# Patient Record
Sex: FEMALE | Race: BLACK OR AFRICAN AMERICAN | NOT HISPANIC OR LATINO | URBAN - METROPOLITAN AREA
[De-identification: names, ages, dates, MRNs, and addresses within clinical notes are randomized per-mention and may not be internally consistent; named-entity substitution may affect disease eponyms.]

---

## 2023-08-21 ENCOUNTER — OUTPATIENT (OUTPATIENT)
Dept: OUTPATIENT SERVICES | Facility: HOSPITAL | Age: 23
LOS: 1 days | End: 2023-08-21
Payer: COMMERCIAL

## 2023-08-21 DIAGNOSIS — Z3A.00 WEEKS OF GESTATION OF PREGNANCY NOT SPECIFIED: ICD-10-CM

## 2023-08-21 DIAGNOSIS — O26.899 OTHER SPECIFIED PREGNANCY RELATED CONDITIONS, UNSPECIFIED TRIMESTER: ICD-10-CM

## 2023-08-21 LAB
ALBUMIN SERPL ELPH-MCNC: 3.7 G/DL — SIGNIFICANT CHANGE UP (ref 3.3–5)
ALP SERPL-CCNC: 54 U/L — SIGNIFICANT CHANGE UP (ref 40–120)
ALT FLD-CCNC: 15 U/L — SIGNIFICANT CHANGE UP (ref 10–45)
ANION GAP SERPL CALC-SCNC: 7 MMOL/L — SIGNIFICANT CHANGE UP (ref 5–17)
APPEARANCE UR: CLEAR — SIGNIFICANT CHANGE UP
AST SERPL-CCNC: 23 U/L — SIGNIFICANT CHANGE UP (ref 10–40)
BASOPHILS # BLD AUTO: 0.03 K/UL — SIGNIFICANT CHANGE UP (ref 0–0.2)
BASOPHILS NFR BLD AUTO: 0.4 % — SIGNIFICANT CHANGE UP (ref 0–2)
BILIRUB SERPL-MCNC: 0.9 MG/DL — SIGNIFICANT CHANGE UP (ref 0.2–1.2)
BILIRUB UR-MCNC: NEGATIVE — SIGNIFICANT CHANGE UP
BUN SERPL-MCNC: 10 MG/DL — SIGNIFICANT CHANGE UP (ref 7–23)
CALCIUM SERPL-MCNC: 9.2 MG/DL — SIGNIFICANT CHANGE UP (ref 8.4–10.5)
CHLORIDE SERPL-SCNC: 104 MMOL/L — SIGNIFICANT CHANGE UP (ref 96–108)
CO2 SERPL-SCNC: 22 MMOL/L — SIGNIFICANT CHANGE UP (ref 22–31)
COLOR SPEC: YELLOW — SIGNIFICANT CHANGE UP
CREAT SERPL-MCNC: 0.54 MG/DL — SIGNIFICANT CHANGE UP (ref 0.5–1.3)
DIFF PNL FLD: NEGATIVE — SIGNIFICANT CHANGE UP
EGFR: 131 ML/MIN/1.73M2 — SIGNIFICANT CHANGE UP
EOSINOPHIL # BLD AUTO: 0.05 K/UL — SIGNIFICANT CHANGE UP (ref 0–0.5)
EOSINOPHIL NFR BLD AUTO: 0.7 % — SIGNIFICANT CHANGE UP (ref 0–6)
GLUCOSE SERPL-MCNC: 113 MG/DL — HIGH (ref 70–99)
GLUCOSE UR QL: 100
HCT VFR BLD CALC: 33.3 % — LOW (ref 34.5–45)
HGB BLD-MCNC: 12 G/DL — SIGNIFICANT CHANGE UP (ref 11.5–15.5)
IMM GRANULOCYTES NFR BLD AUTO: 0.7 % — SIGNIFICANT CHANGE UP (ref 0–0.9)
KETONES UR-MCNC: NEGATIVE — SIGNIFICANT CHANGE UP
LEUKOCYTE ESTERASE UR-ACNC: NEGATIVE — SIGNIFICANT CHANGE UP
LYMPHOCYTES # BLD AUTO: 1.91 K/UL — SIGNIFICANT CHANGE UP (ref 1–3.3)
LYMPHOCYTES # BLD AUTO: 24.9 % — SIGNIFICANT CHANGE UP (ref 13–44)
MCHC RBC-ENTMCNC: 32.7 PG — SIGNIFICANT CHANGE UP (ref 27–34)
MCHC RBC-ENTMCNC: 36 GM/DL — SIGNIFICANT CHANGE UP (ref 32–36)
MCV RBC AUTO: 90.7 FL — SIGNIFICANT CHANGE UP (ref 80–100)
MONOCYTES # BLD AUTO: 0.44 K/UL — SIGNIFICANT CHANGE UP (ref 0–0.9)
MONOCYTES NFR BLD AUTO: 5.7 % — SIGNIFICANT CHANGE UP (ref 2–14)
NEUTROPHILS # BLD AUTO: 5.19 K/UL — SIGNIFICANT CHANGE UP (ref 1.8–7.4)
NEUTROPHILS NFR BLD AUTO: 67.6 % — SIGNIFICANT CHANGE UP (ref 43–77)
NITRITE UR-MCNC: NEGATIVE — SIGNIFICANT CHANGE UP
NRBC # BLD: 0 /100 WBCS — SIGNIFICANT CHANGE UP (ref 0–0)
PH UR: 6 — SIGNIFICANT CHANGE UP (ref 5–8)
PLATELET # BLD AUTO: 203 K/UL — SIGNIFICANT CHANGE UP (ref 150–400)
POTASSIUM SERPL-MCNC: 3.8 MMOL/L — SIGNIFICANT CHANGE UP (ref 3.5–5.3)
POTASSIUM SERPL-SCNC: 3.8 MMOL/L — SIGNIFICANT CHANGE UP (ref 3.5–5.3)
PROT SERPL-MCNC: 6.7 G/DL — SIGNIFICANT CHANGE UP (ref 6–8.3)
PROT UR-MCNC: NEGATIVE MG/DL — SIGNIFICANT CHANGE UP
RBC # BLD: 3.67 M/UL — LOW (ref 3.8–5.2)
RBC # FLD: 12.8 % — SIGNIFICANT CHANGE UP (ref 10.3–14.5)
SODIUM SERPL-SCNC: 133 MMOL/L — LOW (ref 135–145)
SP GR SPEC: 1.02 — SIGNIFICANT CHANGE UP (ref 1–1.03)
UROBILINOGEN FLD QL: 1 E.U./DL — SIGNIFICANT CHANGE UP
WBC # BLD: 7.67 K/UL — SIGNIFICANT CHANGE UP (ref 3.8–10.5)
WBC # FLD AUTO: 7.67 K/UL — SIGNIFICANT CHANGE UP (ref 3.8–10.5)

## 2023-08-21 PROCEDURE — 36415 COLL VENOUS BLD VENIPUNCTURE: CPT

## 2023-08-21 PROCEDURE — 80053 COMPREHEN METABOLIC PANEL: CPT

## 2023-08-21 PROCEDURE — 87086 URINE CULTURE/COLONY COUNT: CPT

## 2023-08-21 PROCEDURE — 99214 OFFICE O/P EST MOD 30 MIN: CPT

## 2023-08-21 PROCEDURE — 85025 COMPLETE CBC W/AUTO DIFF WBC: CPT

## 2023-08-21 PROCEDURE — 81003 URINALYSIS AUTO W/O SCOPE: CPT

## 2023-08-21 RX ORDER — SODIUM CHLORIDE 9 MG/ML
1000 INJECTION, SOLUTION INTRAVENOUS
Refills: 0 | Status: DISCONTINUED | OUTPATIENT
Start: 2023-08-21 | End: 2023-09-05

## 2023-08-21 RX ORDER — ACETAMINOPHEN 500 MG
1000 TABLET ORAL ONCE
Refills: 0 | Status: COMPLETED | OUTPATIENT
Start: 2023-08-21 | End: 2023-08-21

## 2023-08-21 RX ADMIN — Medication 400 MILLIGRAM(S): at 15:50

## 2023-08-21 RX ADMIN — SODIUM CHLORIDE 999 MILLILITER(S): 9 INJECTION, SOLUTION INTRAVENOUS at 15:46

## 2023-08-22 LAB
CULTURE RESULTS: NO GROWTH — SIGNIFICANT CHANGE UP
SPECIMEN SOURCE: SIGNIFICANT CHANGE UP

## 2023-11-14 ENCOUNTER — OUTPATIENT (OUTPATIENT)
Dept: OUTPATIENT SERVICES | Facility: HOSPITAL | Age: 23
LOS: 1 days | End: 2023-11-14
Payer: COMMERCIAL

## 2023-11-14 ENCOUNTER — EMERGENCY (EMERGENCY)
Facility: HOSPITAL | Age: 23
LOS: 1 days | Discharge: HOME | End: 2023-11-14
Attending: STUDENT IN AN ORGANIZED HEALTH CARE EDUCATION/TRAINING PROGRAM | Admitting: STUDENT IN AN ORGANIZED HEALTH CARE EDUCATION/TRAINING PROGRAM
Payer: COMMERCIAL

## 2023-11-14 VITALS
SYSTOLIC BLOOD PRESSURE: 159 MMHG | HEART RATE: 82 BPM | OXYGEN SATURATION: 99 % | RESPIRATION RATE: 24 BRPM | DIASTOLIC BLOOD PRESSURE: 102 MMHG | TEMPERATURE: 97 F

## 2023-11-14 DIAGNOSIS — O26.899 OTHER SPECIFIED PREGNANCY RELATED CONDITIONS, UNSPECIFIED TRIMESTER: ICD-10-CM

## 2023-11-14 LAB
ALBUMIN SERPL ELPH-MCNC: 3.6 G/DL — SIGNIFICANT CHANGE UP (ref 3.3–5)
ALBUMIN SERPL ELPH-MCNC: 3.6 G/DL — SIGNIFICANT CHANGE UP (ref 3.3–5)
ALP SERPL-CCNC: 90 U/L — SIGNIFICANT CHANGE UP (ref 40–120)
ALP SERPL-CCNC: 90 U/L — SIGNIFICANT CHANGE UP (ref 40–120)
ALT FLD-CCNC: 48 U/L — HIGH (ref 10–45)
ALT FLD-CCNC: 48 U/L — HIGH (ref 10–45)
ANION GAP SERPL CALC-SCNC: 12 MMOL/L — SIGNIFICANT CHANGE UP (ref 5–17)
ANION GAP SERPL CALC-SCNC: 12 MMOL/L — SIGNIFICANT CHANGE UP (ref 5–17)
APPEARANCE UR: CLEAR — SIGNIFICANT CHANGE UP
APPEARANCE UR: CLEAR — SIGNIFICANT CHANGE UP
APTT BLD: 23.6 SEC — LOW (ref 24.5–35.6)
APTT BLD: 23.6 SEC — LOW (ref 24.5–35.6)
AST SERPL-CCNC: 62 U/L — HIGH (ref 10–40)
AST SERPL-CCNC: 62 U/L — HIGH (ref 10–40)
BASOPHILS # BLD AUTO: 0.05 K/UL — SIGNIFICANT CHANGE UP (ref 0–0.2)
BASOPHILS # BLD AUTO: 0.05 K/UL — SIGNIFICANT CHANGE UP (ref 0–0.2)
BASOPHILS NFR BLD AUTO: 0.5 % — SIGNIFICANT CHANGE UP (ref 0–2)
BASOPHILS NFR BLD AUTO: 0.5 % — SIGNIFICANT CHANGE UP (ref 0–2)
BILIRUB SERPL-MCNC: 0.5 MG/DL — SIGNIFICANT CHANGE UP (ref 0.2–1.2)
BILIRUB SERPL-MCNC: 0.5 MG/DL — SIGNIFICANT CHANGE UP (ref 0.2–1.2)
BILIRUB UR-MCNC: NEGATIVE — SIGNIFICANT CHANGE UP
BILIRUB UR-MCNC: NEGATIVE — SIGNIFICANT CHANGE UP
BLD GP AB SCN SERPL QL: NEGATIVE — SIGNIFICANT CHANGE UP
BLD GP AB SCN SERPL QL: NEGATIVE — SIGNIFICANT CHANGE UP
BUN SERPL-MCNC: 9 MG/DL — SIGNIFICANT CHANGE UP (ref 7–23)
BUN SERPL-MCNC: 9 MG/DL — SIGNIFICANT CHANGE UP (ref 7–23)
CALCIUM SERPL-MCNC: 9.2 MG/DL — SIGNIFICANT CHANGE UP (ref 8.4–10.5)
CALCIUM SERPL-MCNC: 9.2 MG/DL — SIGNIFICANT CHANGE UP (ref 8.4–10.5)
CHLORIDE SERPL-SCNC: 101 MMOL/L — SIGNIFICANT CHANGE UP (ref 96–108)
CHLORIDE SERPL-SCNC: 101 MMOL/L — SIGNIFICANT CHANGE UP (ref 96–108)
CO2 SERPL-SCNC: 21 MMOL/L — LOW (ref 22–31)
CO2 SERPL-SCNC: 21 MMOL/L — LOW (ref 22–31)
COLOR SPEC: YELLOW — SIGNIFICANT CHANGE UP
COLOR SPEC: YELLOW — SIGNIFICANT CHANGE UP
CREAT ?TM UR-MCNC: 15 MG/DL — SIGNIFICANT CHANGE UP
CREAT ?TM UR-MCNC: 15 MG/DL — SIGNIFICANT CHANGE UP
CREAT SERPL-MCNC: 0.64 MG/DL — SIGNIFICANT CHANGE UP (ref 0.5–1.3)
CREAT SERPL-MCNC: 0.64 MG/DL — SIGNIFICANT CHANGE UP (ref 0.5–1.3)
DIFF PNL FLD: NEGATIVE — SIGNIFICANT CHANGE UP
DIFF PNL FLD: NEGATIVE — SIGNIFICANT CHANGE UP
EGFR: 126 ML/MIN/1.73M2 — SIGNIFICANT CHANGE UP
EGFR: 126 ML/MIN/1.73M2 — SIGNIFICANT CHANGE UP
EOSINOPHIL # BLD AUTO: 0.06 K/UL — SIGNIFICANT CHANGE UP (ref 0–0.5)
EOSINOPHIL # BLD AUTO: 0.06 K/UL — SIGNIFICANT CHANGE UP (ref 0–0.5)
EOSINOPHIL NFR BLD AUTO: 0.7 % — SIGNIFICANT CHANGE UP (ref 0–6)
EOSINOPHIL NFR BLD AUTO: 0.7 % — SIGNIFICANT CHANGE UP (ref 0–6)
FIBRINOGEN PPP-MCNC: 228 MG/DL — SIGNIFICANT CHANGE UP (ref 200–445)
FIBRINOGEN PPP-MCNC: 228 MG/DL — SIGNIFICANT CHANGE UP (ref 200–445)
GLUCOSE SERPL-MCNC: 90 MG/DL — SIGNIFICANT CHANGE UP (ref 70–99)
GLUCOSE SERPL-MCNC: 90 MG/DL — SIGNIFICANT CHANGE UP (ref 70–99)
GLUCOSE UR QL: NEGATIVE MG/DL — SIGNIFICANT CHANGE UP
GLUCOSE UR QL: NEGATIVE MG/DL — SIGNIFICANT CHANGE UP
HBV SURFACE AG SER-ACNC: SIGNIFICANT CHANGE UP
HBV SURFACE AG SER-ACNC: SIGNIFICANT CHANGE UP
HCT VFR BLD CALC: 28.9 % — LOW (ref 34.5–45)
HCT VFR BLD CALC: 28.9 % — LOW (ref 34.5–45)
HGB BLD-MCNC: 10.6 G/DL — LOW (ref 11.5–15.5)
HGB BLD-MCNC: 10.6 G/DL — LOW (ref 11.5–15.5)
HIV 1+2 AB+HIV1 P24 AG SERPL QL IA: SIGNIFICANT CHANGE UP
HIV 1+2 AB+HIV1 P24 AG SERPL QL IA: SIGNIFICANT CHANGE UP
IMM GRANULOCYTES NFR BLD AUTO: 2 % — HIGH (ref 0–0.9)
IMM GRANULOCYTES NFR BLD AUTO: 2 % — HIGH (ref 0–0.9)
INR BLD: 0.88 — SIGNIFICANT CHANGE UP (ref 0.85–1.18)
INR BLD: 0.88 — SIGNIFICANT CHANGE UP (ref 0.85–1.18)
KETONES UR-MCNC: NEGATIVE MG/DL — SIGNIFICANT CHANGE UP
KETONES UR-MCNC: NEGATIVE MG/DL — SIGNIFICANT CHANGE UP
KLEIHAUER-BETKE CALCULATION: 0 % — SIGNIFICANT CHANGE UP (ref 0–0.3)
KLEIHAUER-BETKE CALCULATION: 0 % — SIGNIFICANT CHANGE UP (ref 0–0.3)
LDH SERPL L TO P-CCNC: 218 U/L — SIGNIFICANT CHANGE UP (ref 50–242)
LDH SERPL L TO P-CCNC: 218 U/L — SIGNIFICANT CHANGE UP (ref 50–242)
LEUKOCYTE ESTERASE UR-ACNC: NEGATIVE — SIGNIFICANT CHANGE UP
LEUKOCYTE ESTERASE UR-ACNC: NEGATIVE — SIGNIFICANT CHANGE UP
LYMPHOCYTES # BLD AUTO: 2.31 K/UL — SIGNIFICANT CHANGE UP (ref 1–3.3)
LYMPHOCYTES # BLD AUTO: 2.31 K/UL — SIGNIFICANT CHANGE UP (ref 1–3.3)
LYMPHOCYTES # BLD AUTO: 25.2 % — SIGNIFICANT CHANGE UP (ref 13–44)
LYMPHOCYTES # BLD AUTO: 25.2 % — SIGNIFICANT CHANGE UP (ref 13–44)
MCHC RBC-ENTMCNC: 33 PG — SIGNIFICANT CHANGE UP (ref 27–34)
MCHC RBC-ENTMCNC: 33 PG — SIGNIFICANT CHANGE UP (ref 27–34)
MCHC RBC-ENTMCNC: 36.7 GM/DL — HIGH (ref 32–36)
MCHC RBC-ENTMCNC: 36.7 GM/DL — HIGH (ref 32–36)
MCV RBC AUTO: 90 FL — SIGNIFICANT CHANGE UP (ref 80–100)
MCV RBC AUTO: 90 FL — SIGNIFICANT CHANGE UP (ref 80–100)
MONOCYTES # BLD AUTO: 0.76 K/UL — SIGNIFICANT CHANGE UP (ref 0–0.9)
MONOCYTES # BLD AUTO: 0.76 K/UL — SIGNIFICANT CHANGE UP (ref 0–0.9)
MONOCYTES NFR BLD AUTO: 8.3 % — SIGNIFICANT CHANGE UP (ref 2–14)
MONOCYTES NFR BLD AUTO: 8.3 % — SIGNIFICANT CHANGE UP (ref 2–14)
NEUTROPHILS # BLD AUTO: 5.79 K/UL — SIGNIFICANT CHANGE UP (ref 1.8–7.4)
NEUTROPHILS # BLD AUTO: 5.79 K/UL — SIGNIFICANT CHANGE UP (ref 1.8–7.4)
NEUTROPHILS NFR BLD AUTO: 63.3 % — SIGNIFICANT CHANGE UP (ref 43–77)
NEUTROPHILS NFR BLD AUTO: 63.3 % — SIGNIFICANT CHANGE UP (ref 43–77)
NITRITE UR-MCNC: NEGATIVE — SIGNIFICANT CHANGE UP
NITRITE UR-MCNC: NEGATIVE — SIGNIFICANT CHANGE UP
NRBC # BLD: 0 /100 WBCS — SIGNIFICANT CHANGE UP (ref 0–0)
NRBC # BLD: 0 /100 WBCS — SIGNIFICANT CHANGE UP (ref 0–0)
PH UR: 7.5 — SIGNIFICANT CHANGE UP (ref 5–8)
PH UR: 7.5 — SIGNIFICANT CHANGE UP (ref 5–8)
PLATELET # BLD AUTO: 227 K/UL — SIGNIFICANT CHANGE UP (ref 150–400)
PLATELET # BLD AUTO: 227 K/UL — SIGNIFICANT CHANGE UP (ref 150–400)
POTASSIUM SERPL-MCNC: 3.6 MMOL/L — SIGNIFICANT CHANGE UP (ref 3.5–5.3)
POTASSIUM SERPL-MCNC: 3.6 MMOL/L — SIGNIFICANT CHANGE UP (ref 3.5–5.3)
POTASSIUM SERPL-SCNC: 3.6 MMOL/L — SIGNIFICANT CHANGE UP (ref 3.5–5.3)
POTASSIUM SERPL-SCNC: 3.6 MMOL/L — SIGNIFICANT CHANGE UP (ref 3.5–5.3)
PROT ?TM UR-MCNC: <4 MG/DL — SIGNIFICANT CHANGE UP (ref 0–12)
PROT ?TM UR-MCNC: <4 MG/DL — SIGNIFICANT CHANGE UP (ref 0–12)
PROT SERPL-MCNC: 6.6 G/DL — SIGNIFICANT CHANGE UP (ref 6–8.3)
PROT SERPL-MCNC: 6.6 G/DL — SIGNIFICANT CHANGE UP (ref 6–8.3)
PROT UR-MCNC: NEGATIVE MG/DL — SIGNIFICANT CHANGE UP
PROT UR-MCNC: NEGATIVE MG/DL — SIGNIFICANT CHANGE UP
PROT/CREAT UR-RTO: SIGNIFICANT CHANGE UP (ref 0–0.2)
PROT/CREAT UR-RTO: SIGNIFICANT CHANGE UP (ref 0–0.2)
PROTHROM AB SERPL-ACNC: 10.1 SEC — SIGNIFICANT CHANGE UP (ref 9.5–13)
PROTHROM AB SERPL-ACNC: 10.1 SEC — SIGNIFICANT CHANGE UP (ref 9.5–13)
RBC # BLD: 3.21 M/UL — LOW (ref 3.8–5.2)
RBC # BLD: 3.21 M/UL — LOW (ref 3.8–5.2)
RBC # FLD: 12.3 % — SIGNIFICANT CHANGE UP (ref 10.3–14.5)
RBC # FLD: 12.3 % — SIGNIFICANT CHANGE UP (ref 10.3–14.5)
RH IG SCN BLD-IMP: POSITIVE — SIGNIFICANT CHANGE UP
RH IG SCN BLD-IMP: POSITIVE — SIGNIFICANT CHANGE UP
RUBV IGG SER-ACNC: 4.8 INDEX — SIGNIFICANT CHANGE UP
RUBV IGG SER-ACNC: 4.8 INDEX — SIGNIFICANT CHANGE UP
RUBV IGG SER-IMP: POSITIVE — SIGNIFICANT CHANGE UP
RUBV IGG SER-IMP: POSITIVE — SIGNIFICANT CHANGE UP
SODIUM SERPL-SCNC: 134 MMOL/L — LOW (ref 135–145)
SODIUM SERPL-SCNC: 134 MMOL/L — LOW (ref 135–145)
SP GR SPEC: <1.005 — LOW (ref 1–1.03)
SP GR SPEC: <1.005 — LOW (ref 1–1.03)
T PALLIDUM AB TITR SER: NEGATIVE — SIGNIFICANT CHANGE UP
T PALLIDUM AB TITR SER: NEGATIVE — SIGNIFICANT CHANGE UP
URATE SERPL-MCNC: 2.8 MG/DL — SIGNIFICANT CHANGE UP (ref 2.5–7)
URATE SERPL-MCNC: 2.8 MG/DL — SIGNIFICANT CHANGE UP (ref 2.5–7)
UROBILINOGEN FLD QL: 0.2 MG/DL — SIGNIFICANT CHANGE UP (ref 0.2–1)
UROBILINOGEN FLD QL: 0.2 MG/DL — SIGNIFICANT CHANGE UP (ref 0.2–1)
WBC # BLD: 9.15 K/UL — SIGNIFICANT CHANGE UP (ref 3.8–10.5)
WBC # BLD: 9.15 K/UL — SIGNIFICANT CHANGE UP (ref 3.8–10.5)
WBC # FLD AUTO: 9.15 K/UL — SIGNIFICANT CHANGE UP (ref 3.8–10.5)
WBC # FLD AUTO: 9.15 K/UL — SIGNIFICANT CHANGE UP (ref 3.8–10.5)

## 2023-11-14 PROCEDURE — 84550 ASSAY OF BLOOD/URIC ACID: CPT

## 2023-11-14 PROCEDURE — 86850 RBC ANTIBODY SCREEN: CPT

## 2023-11-14 PROCEDURE — 96360 HYDRATION IV INFUSION INIT: CPT

## 2023-11-14 PROCEDURE — 85460 HEMOGLOBIN FETAL: CPT

## 2023-11-14 PROCEDURE — 76818 FETAL BIOPHYS PROFILE W/NST: CPT

## 2023-11-14 PROCEDURE — 87389 HIV-1 AG W/HIV-1&-2 AB AG IA: CPT

## 2023-11-14 PROCEDURE — 82570 ASSAY OF URINE CREATININE: CPT

## 2023-11-14 PROCEDURE — 99285 EMERGENCY DEPT VISIT HI MDM: CPT

## 2023-11-14 PROCEDURE — 81003 URINALYSIS AUTO W/O SCOPE: CPT

## 2023-11-14 PROCEDURE — 85384 FIBRINOGEN ACTIVITY: CPT

## 2023-11-14 PROCEDURE — 86762 RUBELLA ANTIBODY: CPT

## 2023-11-14 PROCEDURE — 86900 BLOOD TYPING SEROLOGIC ABO: CPT

## 2023-11-14 PROCEDURE — 99214 OFFICE O/P EST MOD 30 MIN: CPT

## 2023-11-14 PROCEDURE — 80053 COMPREHEN METABOLIC PANEL: CPT

## 2023-11-14 PROCEDURE — 87340 HEPATITIS B SURFACE AG IA: CPT

## 2023-11-14 PROCEDURE — 83615 LACTATE (LD) (LDH) ENZYME: CPT

## 2023-11-14 PROCEDURE — 85610 PROTHROMBIN TIME: CPT

## 2023-11-14 PROCEDURE — 85730 THROMBOPLASTIN TIME PARTIAL: CPT

## 2023-11-14 PROCEDURE — 36415 COLL VENOUS BLD VENIPUNCTURE: CPT

## 2023-11-14 PROCEDURE — 59025 FETAL NON-STRESS TEST: CPT

## 2023-11-14 PROCEDURE — 87086 URINE CULTURE/COLONY COUNT: CPT

## 2023-11-14 PROCEDURE — 84156 ASSAY OF PROTEIN URINE: CPT

## 2023-11-14 PROCEDURE — 85025 COMPLETE CBC W/AUTO DIFF WBC: CPT

## 2023-11-14 PROCEDURE — 86780 TREPONEMA PALLIDUM: CPT

## 2023-11-14 PROCEDURE — 86901 BLOOD TYPING SEROLOGIC RH(D): CPT

## 2023-11-14 RX ORDER — SODIUM CHLORIDE 9 MG/ML
1000 INJECTION, SOLUTION INTRAVENOUS ONCE
Refills: 0 | Status: DISCONTINUED | OUTPATIENT
Start: 2023-11-14 | End: 2023-11-28

## 2023-11-14 NOTE — ED PROVIDER NOTE - PHYSICAL EXAMINATION
CONST: nontoxic NAD speaking in full sentences  HEAD: atraumatic  EYES: conjunctivae clear  NECK: supple  CARD: regular rate  CHEST: breathing comfortably, no stridor/retractions/tripoding  ABD: Gravid  EXT: FROM  SKIN: warm, dry  NEURO: awake alert answering questions following commands moving all extremities
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
## 2023-11-14 NOTE — ED ADULT NURSE NOTE - NSFALLUNIVINTERV_ED_ALL_ED
Bed/Stretcher in lowest position, wheels locked, appropriate side rails in place/Call bell, personal items and telephone in reach/Instruct patient to call for assistance before getting out of bed/chair/stretcher/Non-slip footwear applied when patient is off stretcher/[place] to call system/Physically safe environment - no spills, clutter or unnecessary equipment/Purposeful proactive rounding/Room/bathroom lighting operational, light cord in reach

## 2023-11-15 LAB
CULTURE RESULTS: SIGNIFICANT CHANGE UP
CULTURE RESULTS: SIGNIFICANT CHANGE UP
SPECIMEN SOURCE: SIGNIFICANT CHANGE UP
SPECIMEN SOURCE: SIGNIFICANT CHANGE UP
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
## 2023-11-16 DIAGNOSIS — F41.9 ANXIETY DISORDER, UNSPECIFIED: ICD-10-CM

## 2023-11-16 DIAGNOSIS — O99.513 DISEASES OF THE RESPIRATORY SYSTEM COMPLICATING PREGNANCY, THIRD TRIMESTER: ICD-10-CM

## 2023-11-16 DIAGNOSIS — O36.5930 MATERNAL CARE FOR OTHER KNOWN OR SUSPECTED POOR FETAL GROWTH, THIRD TRIMESTER, NOT APPLICABLE OR UNSPECIFIED: ICD-10-CM

## 2023-11-16 DIAGNOSIS — O26.893 OTHER SPECIFIED PREGNANCY RELATED CONDITIONS, THIRD TRIMESTER: ICD-10-CM
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
## 2023-11-16 DIAGNOSIS — O99.343 OTHER MENTAL DISORDERS COMPLICATING PREGNANCY, THIRD TRIMESTER: ICD-10-CM

## 2023-11-16 DIAGNOSIS — R10.9 UNSPECIFIED ABDOMINAL PAIN: ICD-10-CM

## 2023-11-16 DIAGNOSIS — O09.213 SUPERVISION OF PREGNANCY WITH HISTORY OF PRE-TERM LABOR, THIRD TRIMESTER: ICD-10-CM

## 2023-11-16 DIAGNOSIS — O21.2 LATE VOMITING OF PREGNANCY: ICD-10-CM

## 2023-11-16 DIAGNOSIS — Z3A.29 29 WEEKS GESTATION OF PREGNANCY: ICD-10-CM

## 2023-11-16 DIAGNOSIS — O09.293 SUPERVISION OF PREGNANCY WITH OTHER POOR REPRODUCTIVE OR OBSTETRIC HISTORY, THIRD TRIMESTER: ICD-10-CM

## 2023-11-16 DIAGNOSIS — J45.909 UNSPECIFIED ASTHMA, UNCOMPLICATED: ICD-10-CM

## 2023-11-16 DIAGNOSIS — O36.8330 MATERNAL CARE FOR ABNORMALITIES OF THE FETAL HEART RATE OR RHYTHM, THIRD TRIMESTER, NOT APPLICABLE OR UNSPECIFIED: ICD-10-CM

## 2023-11-17 DIAGNOSIS — O26.893 OTHER SPECIFIED PREGNANCY RELATED CONDITIONS, THIRD TRIMESTER: ICD-10-CM

## 2023-11-17 DIAGNOSIS — R10.9 UNSPECIFIED ABDOMINAL PAIN: ICD-10-CM

## 2023-11-17 DIAGNOSIS — Z3A.30 30 WEEKS GESTATION OF PREGNANCY: ICD-10-CM

## 2023-11-17 DIAGNOSIS — M54.9 DORSALGIA, UNSPECIFIED: ICD-10-CM

## 2023-12-14 ENCOUNTER — INPATIENT (INPATIENT)
Facility: HOSPITAL | Age: 23
LOS: 6 days | Discharge: HOME | End: 2023-12-21
Attending: INTERNAL MEDICINE | Admitting: OBSTETRICS & GYNECOLOGY
Payer: COMMERCIAL

## 2023-12-14 ENCOUNTER — EMERGENCY (EMERGENCY)
Facility: HOSPITAL | Age: 23
LOS: 1 days | Discharge: HOME | End: 2023-12-14
Attending: STUDENT IN AN ORGANIZED HEALTH CARE EDUCATION/TRAINING PROGRAM | Admitting: STUDENT IN AN ORGANIZED HEALTH CARE EDUCATION/TRAINING PROGRAM
Payer: COMMERCIAL

## 2023-12-14 VITALS
SYSTOLIC BLOOD PRESSURE: 148 MMHG | TEMPERATURE: 98 F | DIASTOLIC BLOOD PRESSURE: 105 MMHG | HEART RATE: 65 BPM | OXYGEN SATURATION: 96 % | WEIGHT: 139.99 LBS | RESPIRATION RATE: 18 BRPM

## 2023-12-14 VITALS
SYSTOLIC BLOOD PRESSURE: 117 MMHG | HEART RATE: 125 BPM | TEMPERATURE: 98 F | DIASTOLIC BLOOD PRESSURE: 114 MMHG | RESPIRATION RATE: 20 BRPM

## 2023-12-14 DIAGNOSIS — F31.9 BIPOLAR DISORDER, UNSPECIFIED: ICD-10-CM

## 2023-12-14 DIAGNOSIS — I27.20 PULMONARY HYPERTENSION, UNSPECIFIED: ICD-10-CM

## 2023-12-14 DIAGNOSIS — T78.02XA ANAPHYLACTIC REACTION DUE TO SHELLFISH (CRUSTACEANS), INITIAL ENCOUNTER: ICD-10-CM

## 2023-12-14 DIAGNOSIS — K55.1 CHRONIC VASCULAR DISORDERS OF INTESTINE: ICD-10-CM
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
## 2023-12-14 DIAGNOSIS — F90.9 ATTENTION-DEFICIT HYPERACTIVITY DISORDER, UNSPECIFIED TYPE: ICD-10-CM

## 2023-12-14 DIAGNOSIS — Z3A.34 34 WEEKS GESTATION OF PREGNANCY: ICD-10-CM

## 2023-12-14 DIAGNOSIS — T88.51XA HYPOTHERMIA FOLLOWING ANESTHESIA, INITIAL ENCOUNTER: ICD-10-CM

## 2023-12-14 DIAGNOSIS — E87.20 ACIDOSIS, UNSPECIFIED: ICD-10-CM

## 2023-12-14 DIAGNOSIS — I48.0 PAROXYSMAL ATRIAL FIBRILLATION: ICD-10-CM

## 2023-12-14 DIAGNOSIS — R41.82 ALTERED MENTAL STATUS, UNSPECIFIED: ICD-10-CM

## 2023-12-14 DIAGNOSIS — Z91.040 LATEX ALLERGY STATUS: ICD-10-CM

## 2023-12-14 DIAGNOSIS — I08.1 RHEUMATIC DISORDERS OF BOTH MITRAL AND TRICUSPID VALVES: ICD-10-CM
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
## 2023-12-14 DIAGNOSIS — Q21.12 PATENT FORAMEN OVALE: ICD-10-CM

## 2023-12-14 DIAGNOSIS — T41.205A ADVERSE EFFECT OF UNSPECIFIED GENERAL ANESTHETICS, INITIAL ENCOUNTER: ICD-10-CM

## 2023-12-14 DIAGNOSIS — K59.00 CONSTIPATION, UNSPECIFIED: ICD-10-CM

## 2023-12-14 DIAGNOSIS — Z23 ENCOUNTER FOR IMMUNIZATION: ICD-10-CM

## 2023-12-14 DIAGNOSIS — I49.9 CARDIAC ARRHYTHMIA, UNSPECIFIED: ICD-10-CM

## 2023-12-14 DIAGNOSIS — F41.9 ANXIETY DISORDER, UNSPECIFIED: ICD-10-CM

## 2023-12-14 DIAGNOSIS — O47.03 FALSE LABOR BEFORE 37 COMPLETED WEEKS OF GESTATION, THIRD TRIMESTER: ICD-10-CM

## 2023-12-14 DIAGNOSIS — Q21.10 ATRIAL SEPTAL DEFECT, UNSPECIFIED: ICD-10-CM

## 2023-12-14 DIAGNOSIS — A74.9 CHLAMYDIAL INFECTION, UNSPECIFIED: ICD-10-CM

## 2023-12-14 DIAGNOSIS — J45.901 UNSPECIFIED ASTHMA WITH (ACUTE) EXACERBATION: ICD-10-CM

## 2023-12-14 DIAGNOSIS — O36.5930 MATERNAL CARE FOR OTHER KNOWN OR SUSPECTED POOR FETAL GROWTH, THIRD TRIMESTER, NOT APPLICABLE OR UNSPECIFIED: ICD-10-CM

## 2023-12-14 DIAGNOSIS — R65.10 SYSTEMIC INFLAMMATORY RESPONSE SYNDROME (SIRS) OF NON-INFECTIOUS ORIGIN WITHOUT ACUTE ORGAN DYSFUNCTION: ICD-10-CM

## 2023-12-14 DIAGNOSIS — E87.1 HYPO-OSMOLALITY AND HYPONATREMIA: ICD-10-CM

## 2023-12-14 PROCEDURE — 99285 EMERGENCY DEPT VISIT HI MDM: CPT

## 2023-12-14 NOTE — ED PROVIDER NOTE - OBJECTIVE STATEMENT
24 yo  at 34w0d presents with  contractions. Per patient, she left [place] from [facility] earlier this evening after presenting with  contractions.  Patient is complaining of contractions every 4-5 minutes and rectal pressure.  She denies leakage of fluid, vaginal bleeding. Endorses fetal movement. Patient denies headache, blurry vision, shortness of breath, RUQ pain. 24 yo  at 34w0d presents with  contractions. Per patient, she left [place] from [facility] earlier this evening after presenting with  contractions.  Patient is complaining of contractions every 4-5 minutes and rectal pressure.  She denies leakage of fluid, vaginal bleeding. Endorses fetal movement. Patient denies headache, blurry vision, shortness of breath, RUQ pain.

## 2023-12-14 NOTE — ED PROVIDER NOTE - CLINICAL SUMMARY MEDICAL DECISION MAKING FREE TEXT BOX
22 yo  at 34w0d presents with  contractions. Per patient, she left [facility] from [facility] earlier this evening after presenting with  contractions.  Patient is complaining of contractions every 4-5 minutes and rectal pressure.  She denies leakage of fluid, vaginal bleeding. Endorses fetal movement. Patient denies headache, blurry vision, shortness of breath, RUQ pain.  Pt afebrile, appears in pain. No vaginal fluid leakage, no bleeding. will admit for premature labor. L&D notified and case discussed with OB/GYn resident on call. 22 yo  at 34w0d presents with  contractions. Per patient, she left [facility] from [facility] earlier this evening after presenting with  contractions.  Patient is complaining of contractions every 4-5 minutes and rectal pressure.  She denies leakage of fluid, vaginal bleeding. Endorses fetal movement. Patient denies headache, blurry vision, shortness of breath, RUQ pain.  Pt afebrile, appears in pain. No vaginal fluid leakage, no bleeding. will admit for premature labor. L&D notified and case discussed with OB/GYn resident on call.

## 2023-12-14 NOTE — OB RN TRIAGE NOTE - FALL HARM RISK - UNIVERSAL INTERVENTIONS
Bed in lowest position, wheels locked, appropriate side rails in place/Call bell, personal items and telephone in reach/Instruct patient to call for assistance before getting out of bed or chair/Non-slip footwear when patient is out of bed/[place] to call system/Physically safe environment - no spills, clutter or unnecessary equipment/Purposeful Proactive Rounding/Room/bathroom lighting operational, light cord in reach Bed in lowest position, wheels locked, appropriate side rails in place/Call bell, personal items and telephone in reach/Instruct patient to call for assistance before getting out of bed or chair/Non-slip footwear when patient is out of bed/[place] to call system/Physically safe environment - no spills, clutter or unnecessary equipment/Purposeful Proactive Rounding/Room/bathroom lighting operational, light cord in reach

## 2023-12-14 NOTE — ED PROVIDER NOTE - MDM ORDERS SUBMITTED SELECTION
PAST SURGICAL HISTORY:  H/O heart transplant 2/2018    Status post left hip replacement      Not Applicable

## 2023-12-14 NOTE — ED PROVIDER NOTE - PHYSICAL EXAMINATION
Constitutional: awake and alert, in pain  HEENT: head normocephalic and atraumatic. moist mucous membranes  Eyes: extraocular movements intact, normal conjunctiva  Neck: supple, normal ROM  Cardiovascular: regular rate   Pulmonary: no respiratory distress  Gastrointestinal: abdomen  gravid, diffusely tender with contrractions  Skin: warm, dry, normal for ethnicity  Musculoskeletal: no edema, no deformity, NROM  Neurological: oriented x4, no focal neurologic deficit.   Psychiatric: calm and cooperative, no SI/HI

## 2023-12-14 NOTE — ED PROVIDER NOTE - ATTENDING APP SHARED VISIT CONTRIBUTION OF CARE
22 yo  at 34w0d presents with  contractions. Per patient, she left A[facility] from [facility] earlier this evening after presenting with  contractions.  Patient is complaining of contractions every 4-5 minutes and rectal pressure.  She denies leakage of fluid, vaginal bleeding. Endorses fetal movement. Patient denies headache, blurry vision, shortness of breath, RUQ pain.  Pt afebrile, appears in pain. No vaginal fluid leakage, no bleeding. will admit for premature labor. L&D notified and case discussed with OB/GYn resident on call. 22 yo  at 34w0d presents with  contractions. Per patient, she left [facility] from [facility] earlier this evening after presenting with  contractions.  Patient is complaining of contractions every 4-5 minutes and rectal pressure.  She denies leakage of fluid, vaginal bleeding. Endorses fetal movement. Patient denies headache, blurry vision, shortness of breath, RUQ pain.  Pt afebrile, appears in pain. No vaginal fluid leakage, no bleeding. will admit for premature labor. L&D notified and case discussed with OB/GYn resident on call.

## 2023-12-15 VITALS
DIASTOLIC BLOOD PRESSURE: 108 MMHG | HEIGHT: 62 IN | SYSTOLIC BLOOD PRESSURE: 159 MMHG | OXYGEN SATURATION: 100 % | RESPIRATION RATE: 15 BRPM | WEIGHT: 150.36 LBS | HEART RATE: 72 BPM | TEMPERATURE: 98 F

## 2023-12-15 DIAGNOSIS — Z98.890 OTHER SPECIFIED POSTPROCEDURAL STATES: Chronic | ICD-10-CM

## 2023-12-15 LAB
ALBUMIN SERPL ELPH-MCNC: 3 G/DL — LOW (ref 3.3–5)
ALBUMIN SERPL ELPH-MCNC: 3 G/DL — LOW (ref 3.3–5)
ALBUMIN SERPL ELPH-MCNC: 4 G/DL — SIGNIFICANT CHANGE UP (ref 3.3–5)
ALBUMIN SERPL ELPH-MCNC: 4 G/DL — SIGNIFICANT CHANGE UP (ref 3.3–5)
ALP SERPL-CCNC: 145 U/L — HIGH (ref 40–120)
ALP SERPL-CCNC: 145 U/L — HIGH (ref 40–120)
ALP SERPL-CCNC: 186 U/L — HIGH (ref 40–120)
ALP SERPL-CCNC: 186 U/L — HIGH (ref 40–120)
ALT FLD-CCNC: 10 U/L — SIGNIFICANT CHANGE UP (ref 10–45)
ALT FLD-CCNC: 10 U/L — SIGNIFICANT CHANGE UP (ref 10–45)
ALT FLD-CCNC: 13 U/L — SIGNIFICANT CHANGE UP (ref 10–45)
ALT FLD-CCNC: 13 U/L — SIGNIFICANT CHANGE UP (ref 10–45)
AMPHET UR-MCNC: NEGATIVE — SIGNIFICANT CHANGE UP
AMPHET UR-MCNC: NEGATIVE — SIGNIFICANT CHANGE UP
ANION GAP SERPL CALC-SCNC: 10 MMOL/L — SIGNIFICANT CHANGE UP (ref 5–17)
ANION GAP SERPL CALC-SCNC: 10 MMOL/L — SIGNIFICANT CHANGE UP (ref 5–17)
ANION GAP SERPL CALC-SCNC: 11 MMOL/L — SIGNIFICANT CHANGE UP (ref 5–17)
ANION GAP SERPL CALC-SCNC: 11 MMOL/L — SIGNIFICANT CHANGE UP (ref 5–17)
APPEARANCE UR: CLEAR — SIGNIFICANT CHANGE UP
APPEARANCE UR: CLEAR — SIGNIFICANT CHANGE UP
AST SERPL-CCNC: 25 U/L — SIGNIFICANT CHANGE UP (ref 10–40)
AST SERPL-CCNC: 25 U/L — SIGNIFICANT CHANGE UP (ref 10–40)
AST SERPL-CCNC: 28 U/L — SIGNIFICANT CHANGE UP (ref 10–40)
AST SERPL-CCNC: 28 U/L — SIGNIFICANT CHANGE UP (ref 10–40)
BACTERIA # UR AUTO: NEGATIVE /HPF — SIGNIFICANT CHANGE UP
BACTERIA # UR AUTO: NEGATIVE /HPF — SIGNIFICANT CHANGE UP
BARBITURATES UR SCN-MCNC: NEGATIVE — SIGNIFICANT CHANGE UP
BARBITURATES UR SCN-MCNC: NEGATIVE — SIGNIFICANT CHANGE UP
BASOPHILS # BLD AUTO: 0.04 K/UL — SIGNIFICANT CHANGE UP (ref 0–0.2)
BASOPHILS # BLD AUTO: 0.04 K/UL — SIGNIFICANT CHANGE UP (ref 0–0.2)
BASOPHILS NFR BLD AUTO: 0.4 % — SIGNIFICANT CHANGE UP (ref 0–2)
BASOPHILS NFR BLD AUTO: 0.4 % — SIGNIFICANT CHANGE UP (ref 0–2)
BENZODIAZ UR-MCNC: NEGATIVE — SIGNIFICANT CHANGE UP
BENZODIAZ UR-MCNC: NEGATIVE — SIGNIFICANT CHANGE UP
BILIRUB SERPL-MCNC: 0.9 MG/DL — SIGNIFICANT CHANGE UP (ref 0.2–1.2)
BILIRUB SERPL-MCNC: 0.9 MG/DL — SIGNIFICANT CHANGE UP (ref 0.2–1.2)
BILIRUB SERPL-MCNC: 1 MG/DL — SIGNIFICANT CHANGE UP (ref 0.2–1.2)
BILIRUB SERPL-MCNC: 1 MG/DL — SIGNIFICANT CHANGE UP (ref 0.2–1.2)
BILIRUB UR-MCNC: NEGATIVE — SIGNIFICANT CHANGE UP
BILIRUB UR-MCNC: NEGATIVE — SIGNIFICANT CHANGE UP
BLD GP AB SCN SERPL QL: NEGATIVE — SIGNIFICANT CHANGE UP
BLD GP AB SCN SERPL QL: NEGATIVE — SIGNIFICANT CHANGE UP
BUN SERPL-MCNC: 3 MG/DL — LOW (ref 7–23)
BUN SERPL-MCNC: 3 MG/DL — LOW (ref 7–23)
BUN SERPL-MCNC: 4 MG/DL — LOW (ref 7–23)
BUN SERPL-MCNC: 4 MG/DL — LOW (ref 7–23)
CALCIUM SERPL-MCNC: 7.2 MG/DL — LOW (ref 8.4–10.5)
CALCIUM SERPL-MCNC: 7.2 MG/DL — LOW (ref 8.4–10.5)
CALCIUM SERPL-MCNC: 8.3 MG/DL — LOW (ref 8.4–10.5)
CALCIUM SERPL-MCNC: 8.3 MG/DL — LOW (ref 8.4–10.5)
CAST: 1 /LPF — SIGNIFICANT CHANGE UP (ref 0–4)
CAST: 1 /LPF — SIGNIFICANT CHANGE UP (ref 0–4)
CHLORIDE SERPL-SCNC: 95 MMOL/L — LOW (ref 96–108)
CHLORIDE SERPL-SCNC: 95 MMOL/L — LOW (ref 96–108)
CHLORIDE SERPL-SCNC: 97 MMOL/L — SIGNIFICANT CHANGE UP (ref 96–108)
CHLORIDE SERPL-SCNC: 97 MMOL/L — SIGNIFICANT CHANGE UP (ref 96–108)
CHLORIDE UR-SCNC: <20 MMOL/L — SIGNIFICANT CHANGE UP
CHLORIDE UR-SCNC: <20 MMOL/L — SIGNIFICANT CHANGE UP
CO2 SERPL-SCNC: 21 MMOL/L — LOW (ref 22–31)
CO2 SERPL-SCNC: 21 MMOL/L — LOW (ref 22–31)
CO2 SERPL-SCNC: 25 MMOL/L — SIGNIFICANT CHANGE UP (ref 22–31)
CO2 SERPL-SCNC: 25 MMOL/L — SIGNIFICANT CHANGE UP (ref 22–31)
COCAINE METAB.OTHER UR-MCNC: NEGATIVE — SIGNIFICANT CHANGE UP
COCAINE METAB.OTHER UR-MCNC: NEGATIVE — SIGNIFICANT CHANGE UP
COLOR SPEC: YELLOW — SIGNIFICANT CHANGE UP
COLOR SPEC: YELLOW — SIGNIFICANT CHANGE UP
CREAT ?TM UR-MCNC: 24 MG/DL — SIGNIFICANT CHANGE UP
CREAT ?TM UR-MCNC: 24 MG/DL — SIGNIFICANT CHANGE UP
CREAT ?TM UR-MCNC: 8 MG/DL — SIGNIFICANT CHANGE UP
CREAT ?TM UR-MCNC: 8 MG/DL — SIGNIFICANT CHANGE UP
CREAT SERPL-MCNC: 0.55 MG/DL — SIGNIFICANT CHANGE UP (ref 0.5–1.3)
CREAT SERPL-MCNC: 0.55 MG/DL — SIGNIFICANT CHANGE UP (ref 0.5–1.3)
CREAT SERPL-MCNC: 0.65 MG/DL — SIGNIFICANT CHANGE UP (ref 0.5–1.3)
CREAT SERPL-MCNC: 0.65 MG/DL — SIGNIFICANT CHANGE UP (ref 0.5–1.3)
DIFF PNL FLD: ABNORMAL
DIFF PNL FLD: ABNORMAL
EGFR: 125 ML/MIN/1.73M2 — SIGNIFICANT CHANGE UP
EGFR: 125 ML/MIN/1.73M2 — SIGNIFICANT CHANGE UP
EGFR: 130 ML/MIN/1.73M2 — SIGNIFICANT CHANGE UP
EGFR: 130 ML/MIN/1.73M2 — SIGNIFICANT CHANGE UP
EOSINOPHIL # BLD AUTO: 0.03 K/UL — SIGNIFICANT CHANGE UP (ref 0–0.5)
EOSINOPHIL # BLD AUTO: 0.03 K/UL — SIGNIFICANT CHANGE UP (ref 0–0.5)
EOSINOPHIL NFR BLD AUTO: 0.3 % — SIGNIFICANT CHANGE UP (ref 0–6)
EOSINOPHIL NFR BLD AUTO: 0.3 % — SIGNIFICANT CHANGE UP (ref 0–6)
FIBRINOGEN PPP-MCNC: 273 MG/DL — SIGNIFICANT CHANGE UP (ref 200–445)
FIBRINOGEN PPP-MCNC: 273 MG/DL — SIGNIFICANT CHANGE UP (ref 200–445)
GLUCOSE BLDC GLUCOMTR-MCNC: 131 MG/DL — HIGH (ref 70–99)
GLUCOSE BLDC GLUCOMTR-MCNC: 131 MG/DL — HIGH (ref 70–99)
GLUCOSE SERPL-MCNC: 242 MG/DL — HIGH (ref 70–99)
GLUCOSE SERPL-MCNC: 242 MG/DL — HIGH (ref 70–99)
GLUCOSE SERPL-MCNC: 98 MG/DL — SIGNIFICANT CHANGE UP (ref 70–99)
GLUCOSE SERPL-MCNC: 98 MG/DL — SIGNIFICANT CHANGE UP (ref 70–99)
GLUCOSE UR QL: >=1000 MG/DL
GLUCOSE UR QL: >=1000 MG/DL
HCT VFR BLD CALC: 30.2 % — LOW (ref 34.5–45)
HCT VFR BLD CALC: 30.2 % — LOW (ref 34.5–45)
HCT VFR BLD CALC: 32.5 % — LOW (ref 34.5–45)
HCT VFR BLD CALC: 32.5 % — LOW (ref 34.5–45)
HGB BLD-MCNC: 10.8 G/DL — LOW (ref 11.5–15.5)
HGB BLD-MCNC: 10.8 G/DL — LOW (ref 11.5–15.5)
HGB BLD-MCNC: 11.6 G/DL — SIGNIFICANT CHANGE UP (ref 11.5–15.5)
HGB BLD-MCNC: 11.6 G/DL — SIGNIFICANT CHANGE UP (ref 11.5–15.5)
IMM GRANULOCYTES NFR BLD AUTO: 0.5 % — SIGNIFICANT CHANGE UP (ref 0–0.9)
IMM GRANULOCYTES NFR BLD AUTO: 0.5 % — SIGNIFICANT CHANGE UP (ref 0–0.9)
KETONES UR-MCNC: NEGATIVE MG/DL — SIGNIFICANT CHANGE UP
KETONES UR-MCNC: NEGATIVE MG/DL — SIGNIFICANT CHANGE UP
LACTATE SERPL-SCNC: 2.3 MMOL/L — HIGH (ref 0.5–2)
LACTATE SERPL-SCNC: 2.3 MMOL/L — HIGH (ref 0.5–2)
LDH SERPL L TO P-CCNC: 248 U/L — HIGH (ref 50–242)
LDH SERPL L TO P-CCNC: 248 U/L — HIGH (ref 50–242)
LEUKOCYTE ESTERASE UR-ACNC: NEGATIVE — SIGNIFICANT CHANGE UP
LEUKOCYTE ESTERASE UR-ACNC: NEGATIVE — SIGNIFICANT CHANGE UP
LYMPHOCYTES # BLD AUTO: 2.39 K/UL — SIGNIFICANT CHANGE UP (ref 1–3.3)
LYMPHOCYTES # BLD AUTO: 2.39 K/UL — SIGNIFICANT CHANGE UP (ref 1–3.3)
LYMPHOCYTES # BLD AUTO: 25.9 % — SIGNIFICANT CHANGE UP (ref 13–44)
LYMPHOCYTES # BLD AUTO: 25.9 % — SIGNIFICANT CHANGE UP (ref 13–44)
MAGNESIUM SERPL-MCNC: 5.7 MG/DL — HIGH (ref 1.6–2.6)
MAGNESIUM SERPL-MCNC: 5.7 MG/DL — HIGH (ref 1.6–2.6)
MAGNESIUM SERPL-MCNC: 5.8 MG/DL — HIGH (ref 1.6–2.6)
MAGNESIUM SERPL-MCNC: 5.8 MG/DL — HIGH (ref 1.6–2.6)
MAGNESIUM SERPL-MCNC: 6.4 MG/DL — HIGH (ref 1.6–2.6)
MAGNESIUM SERPL-MCNC: 6.4 MG/DL — HIGH (ref 1.6–2.6)
MCHC RBC-ENTMCNC: 31.1 PG — SIGNIFICANT CHANGE UP (ref 27–34)
MCHC RBC-ENTMCNC: 31.1 PG — SIGNIFICANT CHANGE UP (ref 27–34)
MCHC RBC-ENTMCNC: 31.3 PG — SIGNIFICANT CHANGE UP (ref 27–34)
MCHC RBC-ENTMCNC: 31.3 PG — SIGNIFICANT CHANGE UP (ref 27–34)
MCHC RBC-ENTMCNC: 35.7 GM/DL — SIGNIFICANT CHANGE UP (ref 32–36)
MCHC RBC-ENTMCNC: 35.7 GM/DL — SIGNIFICANT CHANGE UP (ref 32–36)
MCHC RBC-ENTMCNC: 35.8 GM/DL — SIGNIFICANT CHANGE UP (ref 32–36)
MCHC RBC-ENTMCNC: 35.8 GM/DL — SIGNIFICANT CHANGE UP (ref 32–36)
MCV RBC AUTO: 87 FL — SIGNIFICANT CHANGE UP (ref 80–100)
MCV RBC AUTO: 87 FL — SIGNIFICANT CHANGE UP (ref 80–100)
MCV RBC AUTO: 87.6 FL — SIGNIFICANT CHANGE UP (ref 80–100)
MCV RBC AUTO: 87.6 FL — SIGNIFICANT CHANGE UP (ref 80–100)
METHADONE UR-MCNC: NEGATIVE — SIGNIFICANT CHANGE UP
METHADONE UR-MCNC: NEGATIVE — SIGNIFICANT CHANGE UP
MONOCYTES # BLD AUTO: 0.71 K/UL — SIGNIFICANT CHANGE UP (ref 0–0.9)
MONOCYTES # BLD AUTO: 0.71 K/UL — SIGNIFICANT CHANGE UP (ref 0–0.9)
MONOCYTES NFR BLD AUTO: 7.7 % — SIGNIFICANT CHANGE UP (ref 2–14)
MONOCYTES NFR BLD AUTO: 7.7 % — SIGNIFICANT CHANGE UP (ref 2–14)
NEUTROPHILS # BLD AUTO: 6 K/UL — SIGNIFICANT CHANGE UP (ref 1.8–7.4)
NEUTROPHILS # BLD AUTO: 6 K/UL — SIGNIFICANT CHANGE UP (ref 1.8–7.4)
NEUTROPHILS NFR BLD AUTO: 65.2 % — SIGNIFICANT CHANGE UP (ref 43–77)
NEUTROPHILS NFR BLD AUTO: 65.2 % — SIGNIFICANT CHANGE UP (ref 43–77)
NITRITE UR-MCNC: NEGATIVE — SIGNIFICANT CHANGE UP
NITRITE UR-MCNC: NEGATIVE — SIGNIFICANT CHANGE UP
NRBC # BLD: 0 /100 WBCS — SIGNIFICANT CHANGE UP (ref 0–0)
NT-PROBNP SERPL-SCNC: 110 PG/ML — SIGNIFICANT CHANGE UP (ref 0–300)
NT-PROBNP SERPL-SCNC: 110 PG/ML — SIGNIFICANT CHANGE UP (ref 0–300)
OPIATES UR-MCNC: NEGATIVE — SIGNIFICANT CHANGE UP
OPIATES UR-MCNC: NEGATIVE — SIGNIFICANT CHANGE UP
OSMOLALITY SERPL: 270 MOSM/KG — LOW (ref 275–300)
OSMOLALITY SERPL: 270 MOSM/KG — LOW (ref 275–300)
PCP SPEC-MCNC: SIGNIFICANT CHANGE UP
PCP SPEC-MCNC: SIGNIFICANT CHANGE UP
PCP UR-MCNC: NEGATIVE — SIGNIFICANT CHANGE UP
PCP UR-MCNC: NEGATIVE — SIGNIFICANT CHANGE UP
PH UR: 5 — SIGNIFICANT CHANGE UP (ref 5–8)
PH UR: 5 — SIGNIFICANT CHANGE UP (ref 5–8)
PLATELET # BLD AUTO: 244 K/UL — SIGNIFICANT CHANGE UP (ref 150–400)
PLATELET # BLD AUTO: 244 K/UL — SIGNIFICANT CHANGE UP (ref 150–400)
PLATELET # BLD AUTO: 260 K/UL — SIGNIFICANT CHANGE UP (ref 150–400)
PLATELET # BLD AUTO: 260 K/UL — SIGNIFICANT CHANGE UP (ref 150–400)
POTASSIUM SERPL-MCNC: 3.8 MMOL/L — SIGNIFICANT CHANGE UP (ref 3.5–5.3)
POTASSIUM SERPL-MCNC: 3.8 MMOL/L — SIGNIFICANT CHANGE UP (ref 3.5–5.3)
POTASSIUM SERPL-MCNC: 4.1 MMOL/L — SIGNIFICANT CHANGE UP (ref 3.5–5.3)
POTASSIUM SERPL-MCNC: 4.1 MMOL/L — SIGNIFICANT CHANGE UP (ref 3.5–5.3)
POTASSIUM SERPL-SCNC: 3.8 MMOL/L — SIGNIFICANT CHANGE UP (ref 3.5–5.3)
POTASSIUM SERPL-SCNC: 3.8 MMOL/L — SIGNIFICANT CHANGE UP (ref 3.5–5.3)
POTASSIUM SERPL-SCNC: 4.1 MMOL/L — SIGNIFICANT CHANGE UP (ref 3.5–5.3)
POTASSIUM SERPL-SCNC: 4.1 MMOL/L — SIGNIFICANT CHANGE UP (ref 3.5–5.3)
POTASSIUM UR-SCNC: 4 MMOL/L — SIGNIFICANT CHANGE UP
POTASSIUM UR-SCNC: 4 MMOL/L — SIGNIFICANT CHANGE UP
PROT ?TM UR-MCNC: <4 MG/DL — SIGNIFICANT CHANGE UP (ref 0–12)
PROT ?TM UR-MCNC: <4 MG/DL — SIGNIFICANT CHANGE UP (ref 0–12)
PROT SERPL-MCNC: 6.2 G/DL — SIGNIFICANT CHANGE UP (ref 6–8.3)
PROT SERPL-MCNC: 6.2 G/DL — SIGNIFICANT CHANGE UP (ref 6–8.3)
PROT SERPL-MCNC: 8.2 G/DL — SIGNIFICANT CHANGE UP (ref 6–8.3)
PROT SERPL-MCNC: 8.2 G/DL — SIGNIFICANT CHANGE UP (ref 6–8.3)
PROT UR-MCNC: NEGATIVE MG/DL — SIGNIFICANT CHANGE UP
PROT UR-MCNC: NEGATIVE MG/DL — SIGNIFICANT CHANGE UP
PROT/CREAT UR-RTO: SIGNIFICANT CHANGE UP (ref 0–0.2)
PROT/CREAT UR-RTO: SIGNIFICANT CHANGE UP (ref 0–0.2)
RBC # BLD: 3.47 M/UL — LOW (ref 3.8–5.2)
RBC # BLD: 3.47 M/UL — LOW (ref 3.8–5.2)
RBC # BLD: 3.71 M/UL — LOW (ref 3.8–5.2)
RBC # BLD: 3.71 M/UL — LOW (ref 3.8–5.2)
RBC # FLD: 12.4 % — SIGNIFICANT CHANGE UP (ref 10.3–14.5)
RBC # FLD: 12.4 % — SIGNIFICANT CHANGE UP (ref 10.3–14.5)
RBC # FLD: 12.5 % — SIGNIFICANT CHANGE UP (ref 10.3–14.5)
RBC # FLD: 12.5 % — SIGNIFICANT CHANGE UP (ref 10.3–14.5)
RBC CASTS # UR COMP ASSIST: 12 /HPF — HIGH (ref 0–4)
RBC CASTS # UR COMP ASSIST: 12 /HPF — HIGH (ref 0–4)
RH IG SCN BLD-IMP: POSITIVE — SIGNIFICANT CHANGE UP
RH IG SCN BLD-IMP: POSITIVE — SIGNIFICANT CHANGE UP
SODIUM SERPL-SCNC: 127 MMOL/L — LOW (ref 135–145)
SODIUM SERPL-SCNC: 127 MMOL/L — LOW (ref 135–145)
SODIUM SERPL-SCNC: 132 MMOL/L — LOW (ref 135–145)
SODIUM SERPL-SCNC: 132 MMOL/L — LOW (ref 135–145)
SODIUM UR-SCNC: 30 MMOL/L — SIGNIFICANT CHANGE UP
SODIUM UR-SCNC: 30 MMOL/L — SIGNIFICANT CHANGE UP
SP GR SPEC: 1.01 — SIGNIFICANT CHANGE UP (ref 1–1.03)
SP GR SPEC: 1.01 — SIGNIFICANT CHANGE UP (ref 1–1.03)
SQUAMOUS # UR AUTO: 0 /HPF — SIGNIFICANT CHANGE UP (ref 0–5)
SQUAMOUS # UR AUTO: 0 /HPF — SIGNIFICANT CHANGE UP (ref 0–5)
T PALLIDUM AB TITR SER: NEGATIVE — SIGNIFICANT CHANGE UP
T PALLIDUM AB TITR SER: NEGATIVE — SIGNIFICANT CHANGE UP
THC UR QL: NEGATIVE — SIGNIFICANT CHANGE UP
THC UR QL: NEGATIVE — SIGNIFICANT CHANGE UP
TROPONIN T, HIGH SENSITIVITY RESULT: 7 NG/L — SIGNIFICANT CHANGE UP (ref 0–51)
TROPONIN T, HIGH SENSITIVITY RESULT: 7 NG/L — SIGNIFICANT CHANGE UP (ref 0–51)
URATE SERPL-MCNC: 2.7 MG/DL — SIGNIFICANT CHANGE UP (ref 2.5–7)
URATE SERPL-MCNC: 2.7 MG/DL — SIGNIFICANT CHANGE UP (ref 2.5–7)
UROBILINOGEN FLD QL: 0.2 MG/DL — SIGNIFICANT CHANGE UP (ref 0.2–1)
UROBILINOGEN FLD QL: 0.2 MG/DL — SIGNIFICANT CHANGE UP (ref 0.2–1)
WBC # BLD: 15.99 K/UL — HIGH (ref 3.8–10.5)
WBC # BLD: 15.99 K/UL — HIGH (ref 3.8–10.5)
WBC # BLD: 9.22 K/UL — SIGNIFICANT CHANGE UP (ref 3.8–10.5)
WBC # BLD: 9.22 K/UL — SIGNIFICANT CHANGE UP (ref 3.8–10.5)
WBC # FLD AUTO: 15.99 K/UL — HIGH (ref 3.8–10.5)
WBC # FLD AUTO: 15.99 K/UL — HIGH (ref 3.8–10.5)
WBC # FLD AUTO: 9.22 K/UL — SIGNIFICANT CHANGE UP (ref 3.8–10.5)
WBC # FLD AUTO: 9.22 K/UL — SIGNIFICANT CHANGE UP (ref 3.8–10.5)
WBC UR QL: 1 /HPF — SIGNIFICANT CHANGE UP (ref 0–5)
WBC UR QL: 1 /HPF — SIGNIFICANT CHANGE UP (ref 0–5)

## 2023-12-15 PROCEDURE — 88307 TISSUE EXAM BY PATHOLOGIST: CPT | Mod: 26

## 2023-12-15 PROCEDURE — 93010 ELECTROCARDIOGRAM REPORT: CPT

## 2023-12-15 PROCEDURE — 71045 X-RAY EXAM CHEST 1 VIEW: CPT | Mod: 26

## 2023-12-15 RX ORDER — CEFAZOLIN SODIUM 1 G
2000 VIAL (EA) INJECTION ONCE
Refills: 0 | Status: COMPLETED | OUTPATIENT
Start: 2023-12-15 | End: 2023-12-15

## 2023-12-15 RX ORDER — MAGNESIUM SULFATE 500 MG/ML
1 VIAL (ML) INJECTION
Qty: 40 | Refills: 0 | Status: DISCONTINUED | OUTPATIENT
Start: 2023-12-15 | End: 2023-12-15

## 2023-12-15 RX ORDER — CITRIC ACID/SODIUM CITRATE 300-500 MG
30 SOLUTION, ORAL ORAL ONCE
Refills: 0 | Status: DISCONTINUED | OUTPATIENT
Start: 2023-12-15 | End: 2023-12-15

## 2023-12-15 RX ORDER — MAGNESIUM SULFATE 500 MG/ML
4 VIAL (ML) INJECTION ONCE
Refills: 0 | Status: COMPLETED | OUTPATIENT
Start: 2023-12-15 | End: 2023-12-15

## 2023-12-15 RX ORDER — CHLORHEXIDINE GLUCONATE 213 G/1000ML
1 SOLUTION TOPICAL DAILY
Refills: 0 | Status: DISCONTINUED | OUTPATIENT
Start: 2023-12-15 | End: 2023-12-15

## 2023-12-15 RX ORDER — OXYTOCIN 10 UNIT/ML
VIAL (ML) INJECTION
Qty: 30 | Refills: 0 | Status: DISCONTINUED | OUTPATIENT
Start: 2023-12-15 | End: 2023-12-15

## 2023-12-15 RX ORDER — SODIUM CHLORIDE 9 MG/ML
1000 INJECTION, SOLUTION INTRAVENOUS
Refills: 0 | Status: DISCONTINUED | OUTPATIENT
Start: 2023-12-15 | End: 2023-12-15

## 2023-12-15 RX ORDER — FENTANYL/BUPIVACAINE/NS/PF 2MCG/ML-.1
250 PLASTIC BAG, INJECTION (ML) INJECTION
Refills: 0 | Status: DISCONTINUED | OUTPATIENT
Start: 2023-12-15 | End: 2023-12-15

## 2023-12-15 RX ORDER — IBUPROFEN 200 MG
600 TABLET ORAL EVERY 6 HOURS
Refills: 0 | Status: COMPLETED | OUTPATIENT
Start: 2023-12-15 | End: 2024-11-12

## 2023-12-15 RX ORDER — OXYTOCIN 10 UNIT/ML
333.33 VIAL (ML) INJECTION
Qty: 20 | Refills: 0 | Status: DISCONTINUED | OUTPATIENT
Start: 2023-12-15 | End: 2023-12-15

## 2023-12-15 RX ORDER — AMPICILLIN TRIHYDRATE 250 MG
2 CAPSULE ORAL ONCE
Refills: 0 | Status: COMPLETED | OUTPATIENT
Start: 2023-12-15 | End: 2023-12-15

## 2023-12-15 RX ORDER — DIPHENHYDRAMINE HCL 50 MG
25 CAPSULE ORAL EVERY 6 HOURS
Refills: 0 | Status: COMPLETED | OUTPATIENT
Start: 2023-12-15 | End: 2024-11-12

## 2023-12-15 RX ORDER — ACETAMINOPHEN 500 MG
1000 TABLET ORAL ONCE
Refills: 0 | Status: COMPLETED | OUTPATIENT
Start: 2023-12-15 | End: 2023-12-15

## 2023-12-15 RX ORDER — MAGNESIUM SULFATE 500 MG/ML
2 VIAL (ML) INJECTION
Qty: 40 | Refills: 0 | Status: DISCONTINUED | OUTPATIENT
Start: 2023-12-15 | End: 2023-12-15

## 2023-12-15 RX ORDER — OXYCODONE HYDROCHLORIDE 5 MG/1
5 TABLET ORAL ONCE
Refills: 0 | Status: DISCONTINUED | OUTPATIENT
Start: 2023-12-15 | End: 2023-12-21

## 2023-12-15 RX ORDER — AMPICILLIN TRIHYDRATE 250 MG
1 CAPSULE ORAL EVERY 4 HOURS
Refills: 0 | Status: DISCONTINUED | OUTPATIENT
Start: 2023-12-15 | End: 2023-12-15

## 2023-12-15 RX ORDER — CITRIC ACID/SODIUM CITRATE 300-500 MG
15 SOLUTION, ORAL ORAL EVERY 6 HOURS
Refills: 0 | Status: DISCONTINUED | OUTPATIENT
Start: 2023-12-15 | End: 2023-12-15

## 2023-12-15 RX ORDER — SODIUM CHLORIDE 9 MG/ML
1 INJECTION INTRAMUSCULAR; INTRAVENOUS; SUBCUTANEOUS ONCE
Refills: 0 | Status: COMPLETED | OUTPATIENT
Start: 2023-12-15 | End: 2023-12-15

## 2023-12-15 RX ORDER — NIFEDIPINE 30 MG
30 TABLET, EXTENDED RELEASE 24 HR ORAL EVERY 24 HOURS
Refills: 0 | Status: DISCONTINUED | OUTPATIENT
Start: 2023-12-15 | End: 2023-12-15

## 2023-12-15 RX ORDER — DIPHENHYDRAMINE HCL 50 MG
25 CAPSULE ORAL EVERY 6 HOURS
Refills: 0 | Status: DISCONTINUED | OUTPATIENT
Start: 2023-12-15 | End: 2023-12-21

## 2023-12-15 RX ORDER — MAGNESIUM SULFATE 500 MG/ML
2 VIAL (ML) INJECTION
Qty: 40 | Refills: 0 | Status: DISCONTINUED | OUTPATIENT
Start: 2023-12-15 | End: 2023-12-16

## 2023-12-15 RX ORDER — OXYTOCIN 10 UNIT/ML
333.33 VIAL (ML) INJECTION
Qty: 20 | Refills: 0 | Status: DISCONTINUED | OUTPATIENT
Start: 2023-12-15 | End: 2023-12-21

## 2023-12-15 RX ORDER — TETANUS TOXOID, REDUCED DIPHTHERIA TOXOID AND ACELLULAR PERTUSSIS VACCINE, ADSORBED 5; 2.5; 8; 8; 2.5 [IU]/.5ML; [IU]/.5ML; UG/.5ML; UG/.5ML; UG/.5ML
0.5 SUSPENSION INTRAMUSCULAR ONCE
Refills: 0 | Status: COMPLETED | OUTPATIENT
Start: 2023-12-15

## 2023-12-15 RX ORDER — OXYCODONE HYDROCHLORIDE 5 MG/1
5 TABLET ORAL
Refills: 0 | Status: COMPLETED | OUTPATIENT
Start: 2023-12-15 | End: 2023-12-22

## 2023-12-15 RX ORDER — ENOXAPARIN SODIUM 100 MG/ML
40 INJECTION SUBCUTANEOUS EVERY 24 HOURS
Refills: 0 | Status: DISCONTINUED | OUTPATIENT
Start: 2023-12-16 | End: 2023-12-21

## 2023-12-15 RX ORDER — AZITHROMYCIN 500 MG/1
500 TABLET, FILM COATED ORAL ONCE
Refills: 0 | Status: COMPLETED | OUTPATIENT
Start: 2023-12-15 | End: 2023-12-15

## 2023-12-15 RX ORDER — MAGNESIUM SULFATE 500 MG/ML
4 VIAL (ML) INJECTION ONCE
Refills: 0 | Status: DISCONTINUED | OUTPATIENT
Start: 2023-12-15 | End: 2023-12-15

## 2023-12-15 RX ORDER — SODIUM CHLORIDE 9 MG/ML
1000 INJECTION INTRAMUSCULAR; INTRAVENOUS; SUBCUTANEOUS
Refills: 0 | Status: DISCONTINUED | OUTPATIENT
Start: 2023-12-15 | End: 2023-12-17

## 2023-12-15 RX ORDER — INFLUENZA VIRUS VACCINE 15; 15; 15; 15 UG/.5ML; UG/.5ML; UG/.5ML; UG/.5ML
0.5 SUSPENSION INTRAMUSCULAR ONCE
Refills: 0 | Status: DISCONTINUED | OUTPATIENT
Start: 2023-12-15 | End: 2023-12-15

## 2023-12-15 RX ORDER — MAGNESIUM HYDROXIDE 400 MG/1
30 TABLET, CHEWABLE ORAL
Refills: 0 | Status: DISCONTINUED | OUTPATIENT
Start: 2023-12-15 | End: 2023-12-21

## 2023-12-15 RX ORDER — OXYCODONE HYDROCHLORIDE 5 MG/1
5 TABLET ORAL
Refills: 0 | Status: DISCONTINUED | OUTPATIENT
Start: 2023-12-15 | End: 2023-12-21

## 2023-12-15 RX ORDER — ACETAMINOPHEN 500 MG
975 TABLET ORAL
Refills: 0 | Status: DISCONTINUED | OUTPATIENT
Start: 2023-12-15 | End: 2023-12-21

## 2023-12-15 RX ORDER — LANOLIN
1 OINTMENT (GRAM) TOPICAL EVERY 6 HOURS
Refills: 0 | Status: DISCONTINUED | OUTPATIENT
Start: 2023-12-15 | End: 2023-12-21

## 2023-12-15 RX ORDER — KETOROLAC TROMETHAMINE 30 MG/ML
30 SYRINGE (ML) INJECTION EVERY 6 HOURS
Refills: 0 | Status: DISCONTINUED | OUTPATIENT
Start: 2023-12-15 | End: 2023-12-16

## 2023-12-15 RX ORDER — METOCLOPRAMIDE HCL 10 MG
10 TABLET ORAL ONCE
Refills: 0 | Status: COMPLETED | OUTPATIENT
Start: 2023-12-15 | End: 2023-12-15

## 2023-12-15 RX ORDER — SIMETHICONE 80 MG/1
80 TABLET, CHEWABLE ORAL EVERY 4 HOURS
Refills: 0 | Status: DISCONTINUED | OUTPATIENT
Start: 2023-12-15 | End: 2023-12-21

## 2023-12-15 RX ADMIN — Medication 108 GRAM(S): at 06:30

## 2023-12-15 RX ADMIN — AZITHROMYCIN 255 MILLIGRAM(S): 500 TABLET, FILM COATED ORAL at 14:29

## 2023-12-15 RX ADMIN — Medication 30 MILLIGRAM(S): at 15:02

## 2023-12-15 RX ADMIN — OXYCODONE HYDROCHLORIDE 5 MILLIGRAM(S): 5 TABLET ORAL at 22:06

## 2023-12-15 RX ADMIN — SODIUM CHLORIDE 125 MILLILITER(S): 9 INJECTION, SOLUTION INTRAVENOUS at 02:15

## 2023-12-15 RX ADMIN — SODIUM CHLORIDE 125 MILLILITER(S): 9 INJECTION, SOLUTION INTRAVENOUS at 00:19

## 2023-12-15 RX ADMIN — Medication 10 MILLIGRAM(S): at 09:38

## 2023-12-15 RX ADMIN — Medication 25 MILLIGRAM(S): at 20:56

## 2023-12-15 RX ADMIN — Medication 108 GRAM(S): at 10:36

## 2023-12-15 RX ADMIN — OXYCODONE HYDROCHLORIDE 5 MILLIGRAM(S): 5 TABLET ORAL at 23:06

## 2023-12-15 RX ADMIN — CHLORHEXIDINE GLUCONATE 1 APPLICATION(S): 213 SOLUTION TOPICAL at 13:55

## 2023-12-15 RX ADMIN — Medication 30 MILLILITER(S): at 00:46

## 2023-12-15 RX ADMIN — Medication 216 GRAM(S): at 02:36

## 2023-12-15 RX ADMIN — SIMETHICONE 80 MILLIGRAM(S): 80 TABLET, CHEWABLE ORAL at 22:06

## 2023-12-15 RX ADMIN — Medication 975 MILLIGRAM(S): at 19:16

## 2023-12-15 RX ADMIN — Medication 30 MILLIGRAM(S): at 02:37

## 2023-12-15 RX ADMIN — Medication 400 MILLIGRAM(S): at 12:00

## 2023-12-15 RX ADMIN — Medication 100 MILLIGRAM(S): at 13:46

## 2023-12-15 RX ADMIN — Medication 50 GM/HR: at 00:42

## 2023-12-15 RX ADMIN — SODIUM CHLORIDE 100 MILLILITER(S): 9 INJECTION INTRAMUSCULAR; INTRAVENOUS; SUBCUTANEOUS at 22:49

## 2023-12-15 RX ADMIN — Medication 300 GRAM(S): at 00:19

## 2023-12-15 RX ADMIN — Medication 2 MILLIUNIT(S)/MIN: at 05:01

## 2023-12-15 RX ADMIN — SODIUM CHLORIDE 1 GRAM(S): 9 INJECTION INTRAMUSCULAR; INTRAVENOUS; SUBCUTANEOUS at 23:38

## 2023-12-15 NOTE — OB PROVIDER LABOR PROGRESS NOTE - NS_OBIHICONTRACTIONPATTERNDETAILS_OBGYN_ALL_OB_FT
Ctx difficult to interpret. Pit @6mu.
irregular contractions
irregular contractions
sandeep 3 in 10 minutes
ctx q 2-3 minutes
Lilliana q3-5 minutes on 4 mU of pitocin
irregular contractions 3-4 in 20min
irregular contractions.
irregular ctx
sandeep 2 in 10 minutes.
sandeep 2 in 10 minutes.
sandeep 3 in 10 minutes.

## 2023-12-15 NOTE — OB PROVIDER LABOR PROGRESS NOTE - ASSESSMENT
- Category 2  - FSE in place.   - Will switch lactated ringers to D5 given minimal to moderate variability of fetal heart rate.

## 2023-12-15 NOTE — OB PROVIDER LABOR PROGRESS NOTE - NS_SUBJECTIVE/OBJECTIVE_OBGYN_ALL_OB_FT
Patient seen at bedside for episode of hypotension. BP noted to be 77/43. Heart rate 60. Acute changed in patient mental status. Previously patient alert, moving around in pain, and responding quickly to commands. Now patient sleeping and lethargic. Anesthesia, Dr. Ellis, and Dr. Cao at bedside.

## 2023-12-15 NOTE — OB PROVIDER H&P - NSLOWPPHRISK_OBGYN_A_OB
No previous uterine incision/[place] Pregnancy/Less than or equal to 4 previous vaginal births/No known bleeding disorder/No history of postpartum hemorrhage

## 2023-12-15 NOTE — OB PROVIDER DELIVERY SUMMARY - NSPROVIDERDELIVERYNOTE_OBGYN_ALL_OB_FT
PEC w SF, nonreassuring fetal heart rate tracing remote from delivery    IVF 1700    no complications; placenta to pathology   uterus closed in 2 layers - (1) chromic (2) vicryl  muscle reapproximated with vicryl suture  fascia closed with vicryl suture  SubQ closed with plain suture  Skin closed with ensorb PEC w SF, nonreassuring fetal heart rate tracing remote from delivery    IVF 1700    no complications; placenta to pathology   uterus closed in 2 layers - (1) chromic (2) vicryl  muscle reapproximated with vicryl suture  fascia closed with vicryl suture  SubQ closed with plain suture  Skin closed with ensorb    dictation [id number] PEC w SF, nonreassuring fetal heart rate tracing remote from delivery    IVF 1700    no complications; placenta to pathology   uterus closed in 2 layers - (1) chromic (2) vicryl  muscle reapproximated with vicryl suture  fascia closed with vicryl suture  SubQ closed with plain suture  Skin closed with ensorb    dictation [id number]

## 2023-12-15 NOTE — OB RN PATIENT PROFILE - FUNCTIONAL ASSESSMENT - BASIC MOBILITY 6.
4-calculated by average/Not able to assess (calculate score using [facility] averaging method)  4-calculated by average/Not able to assess (calculate score using [facility] averaging method)

## 2023-12-15 NOTE — PROGRESS NOTE ADULT - SUBJECTIVE AND OBJECTIVE BOX
Patient evaluated at bedside for clinical magnesium check.     She denies visual disturbances including scotoma, headache and right upper quadrant pain. Also denies nausea/vomiting/epigastric pain/shortness of breath. Pain well controlled.      T(C): 35.7 (12-15-23 @ 16:10), Max: 35.9 (12-15-23 @ 12:30)  HR: 77 (12-15-23 @ 18:00) (77 - 102)  BP: 144/81 (12-15-23 @ 18:00) (103/80 - 144/81)  RR: 14 (12-15-23 @ 18:00) (12 - 16)  SpO2: 100% (12-15-23 @ 18:00) (98% - 100%)  Wt(kg): --    Gen: NAD  Pulm: CTAB  Abd: soft, nontender, no rebound or guarding, no RUQ or epigastric tenderness, liver nonpalpable +BS, fundus palpated   : Coates in place  Ext: Reflexes 2+ , edema                          10.8   9.22  )-----------( 244      ( 15 Dec 2023 00:28 )             30.2     12-15    132<L>  |  97  |  4<L>  ----------------------------<  98  4.1   |  25  |  0.65    Ca    8.3<L>      15 Dec 2023 04:58  Mg     5.7     12-15    TPro  8.2  /  Alb  4.0  /  TBili  1.0  /  DBili  x   /  AST  28  /  ALT  13  /  AlkPhos  186<H>  12-15        12-14-23 @ 07:01  -  12-15-23 @ 07:00  --------------------------------------------------------  IN: 1675 mL / OUT: 1450 mL / NET: 225 mL    12-15-23 @ 07:01  -  12-15-23 @ 19:31  --------------------------------------------------------  IN: 2075 mL / OUT: 2750 mL / NET: -675 mL

## 2023-12-15 NOTE — OB PROVIDER H&P - NS_OBGYNHISTORY_OBGYN_ALL_OB_FT
Ante  -spontaneous pregnancy  -nipt wnl  -anatomy scan wnl, diagnosed with IUGR , normal dopplers  -denies taking GCT  -gHTN   -GBS unknown  -EFW 1590g which is 3.3%ile    Ob Hx:  2016 per patient at 32 weeks EFW 3145g  second trimester ab x 2 s/p D&E  sab x 1, VTOP D&C x 2  Gyn Hx: chlamydia treated this pregnancy w/ no test of cure  PMHx: ADHD, asthma (hospitalized at 13 y/o, never intubated), bipolar disorder, manic depression, SMAS followed with vascular surgery  PSHx: D&E x 2 , D&C x 2  Meds: flovent BID, flexeril, albuterol PRN (last used 12 AM), PNV, folic acid  Allergies: shellfish and HPV vaccine Ante  -spontaneous pregnancy  -nipt wnl  -anatomy scan wnl, diagnosed with IUGR , normal dopplers  -denies taking GCT  -gHTN   -GBS unknown  -EFW 1590g which is 3.3%ile    Ob Hx:  2016 per patient at 32 weeks EFW 3145g  second trimester ab x 2 s/p D&E  sab x 1, VTOP D&C x 2  Gyn Hx: chlamydia treated this pregnancy w/ no test of cure  PMHx: ADHD, asthma (hospitalized at 15 y/o, never intubated), bipolar disorder, manic depression, SMAS followed with vascular surgery  PSHx: D&E x 2 , D&C x 2  Meds: flovent BID, flexeril, albuterol PRN (last used 12 AM), PNV, folic acid  Allergies: shellfish and HPV vaccine Ante  -spontaneous pregnancy  -nipt wnl  -anatomy scan wnl, diagnosed with IUGR , normal dopplers  -denies taking GCT  -gHTN   -GBS unknown  -EFW 1590g which is 3.3%ile    Ob Hx:  2016 per patient at 32 vs 38 weeks EFW 3145g baby w/ bronchopulmonary sequestration  second trimester ab x 2 s/p D&E  sab x 1, VTOP D&C x 2  Gyn Hx: chlamydia treated this pregnancy w/ no test of cure  PMHx: ADHD, asthma (hospitalized at 15 y/o, never intubated), bipolar disorder, manic depression, SMAS followed with vascular surgery  PSHx: D&E x 2 , D&C x 2  Meds: flovent BID, flexeril, albuterol PRN (last used 12/14 AM), PNV, folic acid  Allergies: shellfish and HPV vaccine

## 2023-12-15 NOTE — OB PROVIDER LABOR PROGRESS NOTE - ASSESSMENT
- Category 2   - Pitocin currently at 8mu. Will continue to titrate pitocin as tolerated. Will monitor closely.

## 2023-12-15 NOTE — OB PROVIDER LABOR PROGRESS NOTE - NS_SUBJECTIVE/OBJECTIVE_OBGYN_ALL_OB_FT
Patient seen at bedside for nonreassuring fetal heart rate tracing. Clinical status now lethargic and sleepy. Patient temperature rectally noted to be 92.5F. Bear hugger applied to patient. LATE ENTRY NOTE DUE TO PATIENT CARE. NOTE TIME: 1400   Patient seen at bedside for continued low baseline. Abrupt change in patient clinical status. Previously, patient awake, alert, and moving in pain from contractions. Now patient clinically lethargic and sleepy. Rectal temperature noted ot be 92.5F. Fingerstick 131. Patient hypotensive to 70-80/50s. Anesthesia, Dr. Ellis, and  at bedside.

## 2023-12-15 NOTE — OB NEONATOLOGY/PEDIATRICIAN DELIVERY SUMMARY - NSPEDSNEONOTESA_OBGYN_ALL_OB_FT
Called to delivery for prematurity. Infant emerged with good tone and cry. DCC x 30 seconds. Infant brought to radiant warmer, dried, suctioned, and stimulated. Noted to have retractions. Started on CPAP prior to 1 minute of life. Highest O2 requirement 30% to maintain saturations. PE WNL. Stable for transfer to NICU.

## 2023-12-15 NOTE — OB PROVIDER LABOR PROGRESS NOTE - NS_OBIHIFHRDETAILS_OBGYN_ALL_OB_FT
baseline 100, minimal to moderate variability, no accels, no decels.
baseline 110, minimal to moderate variability, +accels, no decels.
baseline 110, minimal to moderate variability, no accels, no decels.
baseline 115;  moderate variability; +accels; -decels; category I tracing
FHT Cat 1,  bpm, moderate variability, + accels, - decels.
FHT: baseline 120 bpm, moderate variability, -decels, - accels, category 1
baseline 115, minimal to moderate variability, no accels, no decels.
baseline 115, moderate variability, +accels, nod ecels.
baseline 100, minimal to moderate variability, +accels, no decels.
115 baseline, moderate variability, +accels, no decels
EFM reviewed. FHT baseline 100, moderate variability, +accels with scalp stim, -decels.
baseline 115, minimal to moderate variability, +accels, no decels

## 2023-12-15 NOTE — OB RN PATIENT PROFILE - FALL HARM RISK - UNIVERSAL INTERVENTIONS
Bed in lowest position, wheels locked, appropriate side rails in place/Call bell, personal items and telephone in reach/Instruct patient to call for assistance before getting out of bed or chair/Non-slip footwear when patient is out of bed/[place] to call system/Physically safe environment - no spills, clutter or unnecessary equipment/Purposeful Proactive Rounding/Room/bathroom lighting operational, light cord in reach Bed in lowest position, wheels locked, appropriate side rails in place/Call bell, personal items and telephone in reach/Instruct patient to call for assistance before getting out of bed or chair/Non-slip footwear when patient is out of bed/[place] to call system/Physically safe environment - no spills, clutter or unnecessary equipment/Purposeful Proactive Rounding/Room/bathroom lighting operational, light cord in reach

## 2023-12-15 NOTE — OB RN PATIENT PROFILE - NS_SOCIALWORKCONSULTREASON_OBGYN_ALL_OB_FT
Patient states she has history of anxiety, manic depression, ADHD and bipolar.  She had group therapy and anger management sessions in the past.  No medications.

## 2023-12-15 NOTE — OB PROVIDER H&P - NSHPPHYSICALEXAM_GEN_ALL_CORE
VS: 176/114 HR 65  General: uncomfortable appearing  Abdomen: soft, non-tender, gravid  TAUS: cephalic presentation  TVUS: no placenta over cervix  SVE: 1/long    FHT: 120 baseline, moderate variability, +accels, no decels  [place]: no contractions VS: 176/114 HR 65  General: uncomfortable appearing  Abdomen: soft, non-tender, gravid  TAUS: cephalic presentation  TVUS: no placenta over cervix  SVE: 1/long    FHT: 120 baseline, moderate variability, +accels, no decels  [place]: no contractions

## 2023-12-15 NOTE — PROGRESS NOTE ADULT - ASSESSMENT
A&P:   Pt is a 23y yo s/p pC/S, c/b PEC w/ SF, seen for a magnesium check.       1. Preeclampsia: Continue IV Magnesium @1G/hr for 24 hrsDenying toxic symptoms currently.   Antihypertensives: nifedipine 30mg  Continue to monitor blood pressures.   Next Magnesium check @ 00:30  Last Magnesium serum level 5.7. Follow up next level.     2. GI: regular diet    3. : strict Is and Os, D/C woodard after discontinuation of IV Magnesium

## 2023-12-15 NOTE — OB PROVIDER LABOR PROGRESS NOTE - ASSESSMENT
- Continue Mg and monitoring of BP's  - Continue to titrate pitocin as tolerated  - Cook balloon and epidural in situ  - Will continue to monitor    Dr. Huertas on  Dariana Zaragoza PA-C

## 2023-12-15 NOTE — OB PROVIDER LABOR PROGRESS NOTE - ASSESSMENT
- Category 2   - s/p woodard balloon . Pitocin currently at 8mu. Will continue to titrate pitocin.   - Patint currently with 7/10 headache. IV ofirmev and reglan ordered; Will reassess   - Will continue IV magnesium at 1g/hr. Next magnesium check at 12:30. Will monitor closely.

## 2023-12-15 NOTE — PRE-ANESTHESIA EVALUATION ADULT - HEART RATE (BEATS/MIN)
General Surgery Week 2 Survey      Flowsheet Row Responses   [facility] patient discharged from? [place]   Does the patient have one of the following disease processes/diagnoses(primary or secondary)? General Surgery   Week 2 attempt successful? Yes   Call start time 1256   Call end time 1304   Is patient permission given to speak with other caregiver? Yes   Person spoke with today (if not patient) and relationship Cira and patient.   Meds reviewed with patient/caregiver? Yes   Is the patient having any side effects they believe may be caused by any medication additions or changes? No   Does the patient have all medications related to this admission filled (includes all antibiotics, pain medications, etc.) N/A   Is the patient taking all medications as directed (includes completed medication regime)? Yes   Does the patient have a follow up appointment scheduled with their surgeon? Yes   Has the patient kept scheduled appointments due by today? Yes   Comments Kept PCP appt today. Surgeon appt 10/12/23.   Has home health visited the patient within 72 hours of discharge? N/A   Psychosocial issues? No   Did the patient receive a copy of their discharge instructions? Yes   Nursing interventions Reviewed instructions with patient   What is the patient's perception of their health status since discharge? Improving   Nursing interventions Nurse provided patient education   Is the patient/caregiver able to teach back signs and symptoms of incisional infection? Increased redness, swelling or pain at the incisonal site, Increased drainage or bleeding, Incisional warmth, Pus or odor from incision, Fever   Is the patient/caregiver able to teach back steps to recovery at home? Set small, achievable goals for return to baseline health, Rest and rebuild strength, gradually increase activity, Practice good oral hygiene, Eat a well-balance diet   If the patient is a current smoker, are they able to teach back resources for  cessation? Not a smoker   Is the patient/caregiver able to teach back the hierarchy of who to call/visit for symptoms/problems? PCP, Specialist, Home health nurse, Urgent Care, ED, 911 Yes   Week 2 call completed? Yes   Graduated Yes   Is the patient interested in additional calls from an ambulatory ? No   Would this patient benefit from a Referral to [facility] Social Work? No   Wrap up additional comments Patient states is improving. [facility] PCP pleased with his progress today at [facility]. States continues to monitor BG-staying around 140. States left foot color normal with no s/s of infection at incisions. Denies any needs/concerns today.   Call end time 1304            Elizabeth ESQUIVEL - Registered Nurse   70

## 2023-12-15 NOTE — OB PROVIDER LABOR PROGRESS NOTE - NS_SUBJECTIVE/OBJECTIVE_OBGYN_ALL_OB_FT
Patient seen at bedside for increased rectal/vaginal pressure. Coates balloon in place. VE: 2/balloon

## 2023-12-15 NOTE — OB PROVIDER LABOR PROGRESS NOTE - NS_SUBJECTIVE/OBJECTIVE_OBGYN_ALL_OB_FT
Patient seen at bedside for clinical magnesium check. Patient denies headache, blurry vision, chest pain, SOB, RUQ or epigastric pain.     Physical Exam:   General: NAD   Pulm: no increased WOB, CTAB   Abd: gravid, nontender

## 2023-12-15 NOTE — OB PROVIDER LABOR PROGRESS NOTE - NS_SUBJECTIVE/OBJECTIVE_OBGYN_ALL_OB_FT
Patient seen at bedside for lightheadedness, headache, and spots in her vision.     Physical exam :   T(C): 36.5 (15 Dec 2023 07:14), Max: 36.7 (14 Dec 2023 23:10)  T(F): 97.7 (15 Dec 2023 07:14), Max: 98.1 (14 Dec 2023 23:33)  HR: 72 (15 Dec 2023 01:35) (65 - 125)  BP: 122/78 (15 Dec 2023 01:35) (117/114 - 159/108)  RR: 15 (15 Dec 2023 01:35) (15 - 20)  SpO2: 100% (15 Dec 2023 01:35) (96% - 100%)    O2 Parameters below as of 15 Dec 2023 00:36  Patient On (Oxygen Delivery Method): room air    General : patient noted to be very sleepy and lethargic; slow to respond to commands.   Pulm: no increased WOB, CTAB   Abd: gravid, nontender  CV: regular rate and rhythm   Ext: no calf tenderness, no edema; BR +3

## 2023-12-15 NOTE — OB PROVIDER LABOR PROGRESS NOTE - ASSESSMENT
Discussed with  [place] and MFM [place]. Per MFM, despite low baseline of 100, FHT remains overall reassuring with moderate variability, ability to elicit accels with scalp stim, no decels. Lack of spontaneous accels is not unexpected in the setting of IV Mg. Given reassuring status, can continue IOL at this time. Will consider C/S if baseline continues to decrease or FHT otherwise becomes non-reassuring.

## 2023-12-15 NOTE — LACTATION INITIAL EVALUATION - NS LACT CON REASON FOR REQ
Routine NICU Consult/pump request/multiparous mom/premature infant/compromised infant/patient request

## 2023-12-15 NOTE — OB PROVIDER DELIVERY SUMMARY - NSSELHIDDEN_OBGYN_ALL_OB_FT
[NS_DeliveryAttending1_OBGYN_ALL_OB_FT:[id number]],[NS_DeliveryAssist1_OBGYN_ALL_OB_FT:[id number]] [NS_DeliveryAttending1_OBGYN_ALL_OB_FT:[id number]],[NS_DeliveryAssist1_OBGYN_ALL_OB_FT:[id number]]

## 2023-12-15 NOTE — OB PROVIDER H&P - NSFIRSTDATEVISIT_OBGYN_ALL_OB
OFFICE VISIT      Patient: Wang Marroquin Date of Service: 2021   : 1951 MRN: [id number]     SUBJECTIVE:   HISTORY OF PRESENT ILLNESS:  Wang Marroquin is a 69 year old female who presents today for med check, discuss labs  Accompanied by: self  Chaperone offered but declined!    HPI  Denies contact with individuals who have tested positive for COVID-19 or that are suspected of having COVID-19. No recent travel. No recent international travel to countries where COVID-19 is confirmed or suspected.     Busy with son/DIL moving into new home.   Got both COVID vaccines (2), tolerated ok.     Here to discuss labs 2021:  1. HGa1C elevated. DM not controlled.   2. Potassium slightly raised. Avoid High-potassium foods: banana, papaya, prunes/prune juice, raisins, willie, kiwi, oranges/orange juice, cantaloupe, honeydew melon, pear. Having oranges daily and bananas often.   3. electrolytes, kidney/liver function in acceptable range.    checking BG every 2-3 days, . Never over 120.    actos 1 tab BID leads to low BG in AM 45-60s. If she can take 1/2 tab in PM, 1 full tab in lunch.       Diet: watching nonveg; small portions. more vegetarian. 2 meals/d  Exercise: walking 20-25 min/d  Bms: daily  Urination: daily  Sleep: daily  Stress: COVID  Living environment: living with daughter and sometimes son/DIL  Support: son/daughter.   Smoking exposure: none  Vision check: in [place]  Dental check: in [place]  Mamm: never, willing, have order.   Cscope: never, does not want to do  Dexa: denies  Immunizations:  TDAP: 2015  Flu vaccine: annually, 2020  prevnar 13: 2020  Pneumonia 23 vaccine: not yet  Shingles vaccine: shingrix (2) ?osco  COVID 2/2    ROS, allergies, medications, PMH, PSH, FH, SH reviewed and updated with patient at present office visit!     Review of Systems   Constitutional: Negative.    HENT: Negative.    Eyes: Negative.    Respiratory: Negative.    Cardiovascular: Negative.    Gastrointestinal:  Negative.    Endocrine: Negative.    Genitourinary: Negative.    Musculoskeletal: Negative.    Skin: Negative.    Allergic/Immunologic: Negative.    Neurological: Negative.    Hematological: Negative.    Psychiatric/Behavioral: Negative.           ALLERGIES:  ALLERGIES:  No Known Allergies    MEDICATIONS:  Current Outpatient Medications   Medication Sig   • rosuvastatin (CRESTOR) 10 MG tablet Take 1 tablet by mouth at bedtime.   • pioglitazone (ACTOS) 15 MG tablet 1 tab by mouth at noon and 1/2 tab PO in PM   • metformin (GLUCOPHAGE) 1000 MG tablet Take 1 tablet by mouth 2 times daily (with meals).   • lisinopril (ZESTRIL) 30 MG tablet Take 1 tablet by mouth daily.   • glimepiride (AMARYL) 4 MG tablet Take 1 tablet by mouth 2 times daily.   • Multiple Vitamin (MULTI-VITAMINS PO)    • aspirin (ECOTRIN) 81 MG EC tablet    • blood glucose (Contour Test) test strip Test blood sugar 1 times daily as directed. Diagnosis: DM 2 uncontrolled. E11.65  Meter: contour     No current facility-administered medications for this visit.        PAST MEDICAL HISTORY:  Past Medical History:   Diagnosis Date   • Diabetes mellitus (CMS/HCC)    • Essential (primary) hypertension    • Hypercholesterolemia        PAST SURGICAL HISTORY:  Past Surgical History:   Procedure Laterality Date   • Eye surgery      b/l cataract sx       FAMILY HISTORY:  Family History   Problem Relation Age of Onset   • Diabetes Mother    • Diabetes Father    • Hypertension Father    • Diabetes Brother    • Heart disease Brother        SOCIAL HISTORY:  Social History     Tobacco Use   • Smoking status: Never Smoker   • Smokeless tobacco: Never Used   Substance Use Topics   • Alcohol use: Yes     Comment: very occasional    • Drug use: No          OBJECTIVE:     Vitals:    04/21/21 1619 04/21/21 1653   BP: (!) 153/83 (!) 140/80   BP Location: LUE - Left upper extremity LUE - Left upper extremity   Patient Position: Sitting Sitting   Cuff Size:  Regular   Pulse: 96     Temp: 97.8 °F (36.6 °C)    TempSrc: Oral    SpO2: 99%    Weight: 51.2 kg (112 lb 15.8 oz)    Height: 4' 8.5\" (1.435 m)    PainSc:  0        Body mass index is 24.88 kg/m².    Physical Exam   Constitutional: She appears well-developed and well-nourished. No distress.   HENT:   Mouth/Throat: Oropharynx is clear and moist.   Eyes: Pupils are equal, round, and reactive to light. Conjunctivae are normal. No scleral icterus.   Neck: No thyromegaly present.   Cardiovascular: Normal rate, regular rhythm and normal heart sounds.   No murmur heard.  Pulmonary/Chest: Effort normal and breath sounds normal. No respiratory distress.   Abdominal: Soft. Bowel sounds are normal. She exhibits no distension. There is no abdominal tenderness.   Musculoskeletal:         General: No edema.      Cervical back: Normal range of motion and neck supple.   Neurological: She is alert.   Skin: Skin is warm.   Psychiatric: She has a normal mood and affect. Her behavior is normal. Judgment and thought content normal.   Nursing note and vitals reviewed.      DIAGNOSTIC STUDIES:   LAB RESULTS:  No results found for this visit on 04/21/21.    ASSESSMENT AND PLAN:   This is a 69 year old year-old female who presents with following Dx (s):     1. Type 2 diabetes mellitus with hyperglycemia, without long-term current use of insulin (CMS/[facility])    2. Hypercholesterolemia    3. Essential hypertension        Orders Placed This Encounter   • CBC with Automated Differential   • Comprehensive Metabolic Panel   • Glycohemoglobin   • Lipid Panel Without Reflex   • Microalbumin Urine Random   • Thyroid Stimulating Hormone Reflex   • SERVICE TO OPHTHALMOLOGY   • rosuvastatin (CRESTOR) 10 MG tablet   • pioglitazone (ACTOS) 15 MG tablet   • metformin (GLUCOPHAGE) 1000 MG tablet   • lisinopril (ZESTRIL) 30 MG tablet   • glimepiride (AMARYL) 4 MG tablet   • blood glucose (Contour Test) test strip         1. Discussed labs/recommendations.   2. Will keep medication  dose same, monitor BG at varying times not just in AM and work on low carb diet, regular exercise.   3. Low carb, heart healthy (low fat/chol/salt) diet, regular exercise (30 min 3-5x/week), weight loss/management; Bring blood sugar log to each visit and call if sugars less than 60 or over 400; Goal AIC of < 6.5% and fasting sugars of  or random sugars of less than 160-180 recommended; Blood pressure goal of 110-130/60-80 advised to prevent kidney and heart damage; Annual eye exam and daily foot check advised; Contact physician for any foot injuries or infections, significant fluctuations in sugars, medication concerns, or health questions.  4. Mamm/DEXA screening  5. POA papers to discuss with children.   6. Low K diet handout given.  7. Did discuss in length COVID-19 symptoms and precautions to take to prevent infection (frequent hand washing, avoid sick contacts, social distancing 6 feet apart rule and use of masks in all public settings). If having COVID symptoms advised to seek medical care (call our office) or go to ER if moderate to severe symptoms.    Encounter Timing:  Total time spent on date of service was 30 minutes.  Time spent includes some or all of the following, both face-to-face time and non face-to-face time, but is not limited to: reviewing records; obtaining and/or reviewing the history; performing a medically appropriate examination/evaluation; counseling patient; documentation, placing orders/referral, and coordinating care.      Proper usage and side effects of medications reviewed and discussed.   Patient education completed on disease process, etiology, and prognosis.  Return to clinic as clinically indicated.    All questions answered and patient verbalized understanding of the conversation/diagnoses and plan of care.    Return in about 4 months (around 8/21/2021).    Instructions provided as documented in the AVS.   Unknown

## 2023-12-15 NOTE — OB PROVIDER H&P - HISTORY OF PRESENT ILLNESS
22 yo  at 34w0d presents with  contractions. Per patient, she left AMA from [facility] [place] earlier this evening after presenting with  contractions. Per chart review patient has gHTN, not yet meeting criteria for PEC w/ SF. Patient is complaining of contractions [id number] and rectal pressure. Upon arrive BP is 176/114. She denies leakage of fluid, vaginal bleeding. Endorses fetal movement. Patient denies headache, blurry vision, shortness of breath, RUQ pain. Patient is requesting epidural for contractions. 24 yo  at 34w0d presents with  contractions. Per patient, she left AMA from [facility] earlier this evening after presenting with  contractions. Per chart review patient has gHTN, not yet meeting criteria for PEC w/ SF. Patient is complaining of contractions [id number] and rectal pressure. Upon arrive BP is 176/114. She denies leakage of fluid, vaginal bleeding. Endorses fetal movement. Patient denies headache, blurry vision, shortness of breath, RUQ pain. Patient is requesting epidural for contractions.

## 2023-12-15 NOTE — OB PROVIDER H&P - ASSESSMENT
24 yo  at 34w0d presents with  contractions, found to have non-sustained severe range BP's.   -admit to labor and delivery for PEC w/ SF  -induce with woodard balloon and pitocin  -start Mg 4g loading dose and 2g/hr maintenance  -GBS unknown, ampicillin ordered  -epidural PRN  -consents signed, all questions answered, patient expressed understanding   -discussed with senior resident Dr. Feliciano  -discussed with attending Dr. Huertas 24 yo  at 34w0d presents with  contractions, found to have non-sustained severe range BP's.   -admit to labor and delivery for PEC w/ SF  -induce with woodard balloon and pitocin  -start Mg 4g loading dose and 2g/hr maintenance  -GBS unknown, ampicillin ordered  -epidural PRN  -consents signed, all questions answered, patient expressed understanding   -discussed with senior resident Dr. eFliciano  -discussed with attending Dr. Huertas

## 2023-12-15 NOTE — OB PROVIDER LABOR PROGRESS NOTE - ASSESSMENT
Plan for epidural, will start pitocin after.   Pt on IV  Mg, one non sustain severe BP recently, oral Nifedipine started. Hold on pushing because epidural will lower her BP for the short term.

## 2023-12-15 NOTE — OB PROVIDER LABOR PROGRESS NOTE - ASSESSMENT
- Category 1   - woodard balloon in place. Pitocin currently at 6mu. Will continue to titrate pitocin as tolerated. Will monitor closely.  - Category 1   - woodard balloon in place. Pitocin currently at 6mu. Will continue to titrate pitocin as tolerated. Will monitor closely.   - Continue IV magnesium for PEC with severe features. Will follow up magnesium level drawn at 6:30am.   - Will continue to monitor BPs closely.

## 2023-12-15 NOTE — OB PROVIDER LABOR PROGRESS NOTE - ASSESSMENT
- Category 2   - Given NRFHT and patients deteriorating clinical status with hypothermia and hypotension  - Category 2   - Given low baseline of FHR and patients deteriorating clinical status with hypothermia and hypotension will proceed with  section.  Risks/benefits discussed with the team.   - Pitocin turned off.   - Blood culture collected for work up of sepsis / hypothermia. Will follow closely.

## 2023-12-15 NOTE — OB PROVIDER H&P - NSICDXPASTMEDICALHX_GEN_ALL_CORE_FT
PAST MEDICAL HISTORY:  ADHD     Asthma     Bipolar disorder     Manic depression     SMAS (superior mesenteric artery syndrome)

## 2023-12-15 NOTE — OB PROVIDER LABOR PROGRESS NOTE - NS_SUBJECTIVE/OBJECTIVE_OBGYN_ALL_OB_FT
Patient seen at bedside for increased rectal pressure. AROM performed 11:00am, clear fluid. FSE applied to fetal scalp given similar baseline to mother and baby.

## 2023-12-15 NOTE — OB PROVIDER LABOR PROGRESS NOTE - ASSESSMENT
- Category 2   - Patient given ephedrine 10mg. Blood pressure improved to 120s/80s.   - Urine toxicology sent given acute change in mental status.   - Will continue to monitor closely.

## 2023-12-16 LAB
ALBUMIN SERPL ELPH-MCNC: 2.9 G/DL — LOW (ref 3.3–5)
ALBUMIN SERPL ELPH-MCNC: 2.9 G/DL — LOW (ref 3.3–5)
ALBUMIN SERPL ELPH-MCNC: 3 G/DL — LOW (ref 3.3–5)
ALBUMIN SERPL ELPH-MCNC: 3 G/DL — LOW (ref 3.3–5)
ALP SERPL-CCNC: 122 U/L — HIGH (ref 40–120)
ALP SERPL-CCNC: 122 U/L — HIGH (ref 40–120)
ALP SERPL-CCNC: 124 U/L — HIGH (ref 40–120)
ALP SERPL-CCNC: 124 U/L — HIGH (ref 40–120)
ALT FLD-CCNC: 10 U/L — SIGNIFICANT CHANGE UP (ref 10–45)
ALT FLD-CCNC: 10 U/L — SIGNIFICANT CHANGE UP (ref 10–45)
ALT FLD-CCNC: 9 U/L — LOW (ref 10–45)
ALT FLD-CCNC: 9 U/L — LOW (ref 10–45)
ANION GAP SERPL CALC-SCNC: 10 MMOL/L — SIGNIFICANT CHANGE UP (ref 5–17)
ANION GAP SERPL CALC-SCNC: 10 MMOL/L — SIGNIFICANT CHANGE UP (ref 5–17)
ANION GAP SERPL CALC-SCNC: 7 MMOL/L — SIGNIFICANT CHANGE UP (ref 5–17)
ANION GAP SERPL CALC-SCNC: 7 MMOL/L — SIGNIFICANT CHANGE UP (ref 5–17)
AST SERPL-CCNC: 26 U/L — SIGNIFICANT CHANGE UP (ref 10–40)
AST SERPL-CCNC: 26 U/L — SIGNIFICANT CHANGE UP (ref 10–40)
AST SERPL-CCNC: 30 U/L — SIGNIFICANT CHANGE UP (ref 10–40)
AST SERPL-CCNC: 30 U/L — SIGNIFICANT CHANGE UP (ref 10–40)
BILIRUB SERPL-MCNC: 0.9 MG/DL — SIGNIFICANT CHANGE UP (ref 0.2–1.2)
BUN SERPL-MCNC: 2 MG/DL — LOW (ref 7–23)
BUN SERPL-MCNC: 2 MG/DL — LOW (ref 7–23)
BUN SERPL-MCNC: 3 MG/DL — LOW (ref 7–23)
BUN SERPL-MCNC: 3 MG/DL — LOW (ref 7–23)
CALCIUM SERPL-MCNC: 7.3 MG/DL — LOW (ref 8.4–10.5)
CHLORIDE SERPL-SCNC: 100 MMOL/L — SIGNIFICANT CHANGE UP (ref 96–108)
CHLORIDE SERPL-SCNC: 100 MMOL/L — SIGNIFICANT CHANGE UP (ref 96–108)
CHLORIDE SERPL-SCNC: 99 MMOL/L — SIGNIFICANT CHANGE UP (ref 96–108)
CHLORIDE SERPL-SCNC: 99 MMOL/L — SIGNIFICANT CHANGE UP (ref 96–108)
CK MB CFR SERPL CALC: 4 NG/ML — SIGNIFICANT CHANGE UP (ref 0–6.7)
CK MB CFR SERPL CALC: 4 NG/ML — SIGNIFICANT CHANGE UP (ref 0–6.7)
CK SERPL-CCNC: 250 U/L — HIGH (ref 25–170)
CK SERPL-CCNC: 250 U/L — HIGH (ref 25–170)
CO2 SERPL-SCNC: 20 MMOL/L — LOW (ref 22–31)
CO2 SERPL-SCNC: 20 MMOL/L — LOW (ref 22–31)
CO2 SERPL-SCNC: 24 MMOL/L — SIGNIFICANT CHANGE UP (ref 22–31)
CO2 SERPL-SCNC: 24 MMOL/L — SIGNIFICANT CHANGE UP (ref 22–31)
CREAT SERPL-MCNC: 0.54 MG/DL — SIGNIFICANT CHANGE UP (ref 0.5–1.3)
CREAT SERPL-MCNC: 0.54 MG/DL — SIGNIFICANT CHANGE UP (ref 0.5–1.3)
CREAT SERPL-MCNC: 0.6 MG/DL — SIGNIFICANT CHANGE UP (ref 0.5–1.3)
CREAT SERPL-MCNC: 0.6 MG/DL — SIGNIFICANT CHANGE UP (ref 0.5–1.3)
EGFR: 128 ML/MIN/1.73M2 — SIGNIFICANT CHANGE UP
EGFR: 128 ML/MIN/1.73M2 — SIGNIFICANT CHANGE UP
EGFR: 131 ML/MIN/1.73M2 — SIGNIFICANT CHANGE UP
EGFR: 131 ML/MIN/1.73M2 — SIGNIFICANT CHANGE UP
GLUCOSE SERPL-MCNC: 101 MG/DL — HIGH (ref 70–99)
GLUCOSE SERPL-MCNC: 101 MG/DL — HIGH (ref 70–99)
GLUCOSE SERPL-MCNC: 91 MG/DL — SIGNIFICANT CHANGE UP (ref 70–99)
GLUCOSE SERPL-MCNC: 91 MG/DL — SIGNIFICANT CHANGE UP (ref 70–99)
HCT VFR BLD CALC: 26.6 % — LOW (ref 34.5–45)
HCT VFR BLD CALC: 26.6 % — LOW (ref 34.5–45)
HCT VFR BLD CALC: 26.8 % — LOW (ref 34.5–45)
HCT VFR BLD CALC: 26.8 % — LOW (ref 34.5–45)
HGB BLD-MCNC: 9.4 G/DL — LOW (ref 11.5–15.5)
HGB BLD-MCNC: 9.4 G/DL — LOW (ref 11.5–15.5)
HGB BLD-MCNC: 9.6 G/DL — LOW (ref 11.5–15.5)
HGB BLD-MCNC: 9.6 G/DL — LOW (ref 11.5–15.5)
LACTATE SERPL-SCNC: 1.3 MMOL/L — SIGNIFICANT CHANGE UP (ref 0.5–2)
LACTATE SERPL-SCNC: 1.3 MMOL/L — SIGNIFICANT CHANGE UP (ref 0.5–2)
LDH SERPL L TO P-CCNC: 364 U/L — HIGH (ref 50–242)
LDH SERPL L TO P-CCNC: 364 U/L — HIGH (ref 50–242)
LDH SERPL L TO P-CCNC: 483 U/L — HIGH (ref 50–242)
LDH SERPL L TO P-CCNC: 483 U/L — HIGH (ref 50–242)
MAGNESIUM SERPL-MCNC: 4.4 MG/DL — HIGH (ref 1.6–2.6)
MCHC RBC-ENTMCNC: 31.3 PG — SIGNIFICANT CHANGE UP (ref 27–34)
MCHC RBC-ENTMCNC: 35.1 GM/DL — SIGNIFICANT CHANGE UP (ref 32–36)
MCHC RBC-ENTMCNC: 35.1 GM/DL — SIGNIFICANT CHANGE UP (ref 32–36)
MCHC RBC-ENTMCNC: 36.1 GM/DL — HIGH (ref 32–36)
MCHC RBC-ENTMCNC: 36.1 GM/DL — HIGH (ref 32–36)
MCV RBC AUTO: 86.6 FL — SIGNIFICANT CHANGE UP (ref 80–100)
MCV RBC AUTO: 86.6 FL — SIGNIFICANT CHANGE UP (ref 80–100)
MCV RBC AUTO: 89.3 FL — SIGNIFICANT CHANGE UP (ref 80–100)
MCV RBC AUTO: 89.3 FL — SIGNIFICANT CHANGE UP (ref 80–100)
NRBC # BLD: 0 /100 WBCS — SIGNIFICANT CHANGE UP (ref 0–0)
OSMOLALITY UR: 180 MOSM/KG — LOW (ref 300–900)
OSMOLALITY UR: 180 MOSM/KG — LOW (ref 300–900)
PLATELET # BLD AUTO: 247 K/UL — SIGNIFICANT CHANGE UP (ref 150–400)
PLATELET # BLD AUTO: 247 K/UL — SIGNIFICANT CHANGE UP (ref 150–400)
PLATELET # BLD AUTO: 255 K/UL — SIGNIFICANT CHANGE UP (ref 150–400)
PLATELET # BLD AUTO: 255 K/UL — SIGNIFICANT CHANGE UP (ref 150–400)
POTASSIUM SERPL-MCNC: 4 MMOL/L — SIGNIFICANT CHANGE UP (ref 3.5–5.3)
POTASSIUM SERPL-MCNC: 4 MMOL/L — SIGNIFICANT CHANGE UP (ref 3.5–5.3)
POTASSIUM SERPL-MCNC: 4.1 MMOL/L — SIGNIFICANT CHANGE UP (ref 3.5–5.3)
POTASSIUM SERPL-MCNC: 4.1 MMOL/L — SIGNIFICANT CHANGE UP (ref 3.5–5.3)
POTASSIUM SERPL-SCNC: 4 MMOL/L — SIGNIFICANT CHANGE UP (ref 3.5–5.3)
POTASSIUM SERPL-SCNC: 4 MMOL/L — SIGNIFICANT CHANGE UP (ref 3.5–5.3)
POTASSIUM SERPL-SCNC: 4.1 MMOL/L — SIGNIFICANT CHANGE UP (ref 3.5–5.3)
POTASSIUM SERPL-SCNC: 4.1 MMOL/L — SIGNIFICANT CHANGE UP (ref 3.5–5.3)
PROT SERPL-MCNC: 5.8 G/DL — LOW (ref 6–8.3)
RBC # BLD: 3 M/UL — LOW (ref 3.8–5.2)
RBC # BLD: 3 M/UL — LOW (ref 3.8–5.2)
RBC # BLD: 3.07 M/UL — LOW (ref 3.8–5.2)
RBC # BLD: 3.07 M/UL — LOW (ref 3.8–5.2)
RBC # FLD: 12.5 % — SIGNIFICANT CHANGE UP (ref 10.3–14.5)
RBC # FLD: 12.5 % — SIGNIFICANT CHANGE UP (ref 10.3–14.5)
RBC # FLD: 12.6 % — SIGNIFICANT CHANGE UP (ref 10.3–14.5)
RBC # FLD: 12.6 % — SIGNIFICANT CHANGE UP (ref 10.3–14.5)
SODIUM SERPL-SCNC: 130 MMOL/L — LOW (ref 135–145)
WBC # BLD: 13.08 K/UL — HIGH (ref 3.8–10.5)
WBC # BLD: 13.08 K/UL — HIGH (ref 3.8–10.5)
WBC # BLD: 13.97 K/UL — HIGH (ref 3.8–10.5)
WBC # BLD: 13.97 K/UL — HIGH (ref 3.8–10.5)
WBC # FLD AUTO: 13.08 K/UL — HIGH (ref 3.8–10.5)
WBC # FLD AUTO: 13.08 K/UL — HIGH (ref 3.8–10.5)
WBC # FLD AUTO: 13.97 K/UL — HIGH (ref 3.8–10.5)
WBC # FLD AUTO: 13.97 K/UL — HIGH (ref 3.8–10.5)

## 2023-12-16 PROCEDURE — 99221 1ST HOSP IP/OBS SF/LOW 40: CPT

## 2023-12-16 PROCEDURE — 99222 1ST HOSP IP/OBS MODERATE 55: CPT

## 2023-12-16 PROCEDURE — 93306 TTE W/DOPPLER COMPLETE: CPT | Mod: 26

## 2023-12-16 RX ORDER — POLYETHYLENE GLYCOL 3350 17 G/17G
17 POWDER, FOR SOLUTION ORAL DAILY
Refills: 0 | Status: DISCONTINUED | OUTPATIENT
Start: 2023-12-16 | End: 2023-12-21

## 2023-12-16 RX ORDER — ALBUTEROL 90 UG/1
2 AEROSOL, METERED ORAL EVERY 6 HOURS
Refills: 0 | Status: DISCONTINUED | OUTPATIENT
Start: 2023-12-16 | End: 2023-12-20

## 2023-12-16 RX ORDER — IBUPROFEN 200 MG
600 TABLET ORAL EVERY 6 HOURS
Refills: 0 | Status: DISCONTINUED | OUTPATIENT
Start: 2023-12-16 | End: 2023-12-21

## 2023-12-16 RX ORDER — NIFEDIPINE 30 MG
30 TABLET, EXTENDED RELEASE 24 HR ORAL EVERY 12 HOURS
Refills: 0 | Status: DISCONTINUED | OUTPATIENT
Start: 2023-12-16 | End: 2023-12-18

## 2023-12-16 RX ORDER — DIPHENHYDRAMINE HCL 50 MG
50 CAPSULE ORAL ONCE
Refills: 0 | Status: COMPLETED | OUTPATIENT
Start: 2023-12-16 | End: 2023-12-16

## 2023-12-16 RX ORDER — MOMETASONE FUROATE 220 UG/1
2 INHALANT RESPIRATORY (INHALATION) DAILY
Refills: 0 | Status: DISCONTINUED | OUTPATIENT
Start: 2023-12-16 | End: 2023-12-21

## 2023-12-16 RX ADMIN — SIMETHICONE 80 MILLIGRAM(S): 80 TABLET, CHEWABLE ORAL at 04:50

## 2023-12-16 RX ADMIN — Medication 30 MILLIGRAM(S): at 18:19

## 2023-12-16 RX ADMIN — Medication 600 MILLIGRAM(S): at 18:18

## 2023-12-16 RX ADMIN — Medication 30 MILLIGRAM(S): at 06:32

## 2023-12-16 RX ADMIN — Medication 975 MILLIGRAM(S): at 04:00

## 2023-12-16 RX ADMIN — OXYCODONE HYDROCHLORIDE 5 MILLIGRAM(S): 5 TABLET ORAL at 04:11

## 2023-12-16 RX ADMIN — SODIUM CHLORIDE 100 MILLILITER(S): 9 INJECTION INTRAMUSCULAR; INTRAVENOUS; SUBCUTANEOUS at 08:05

## 2023-12-16 RX ADMIN — MAGNESIUM HYDROXIDE 30 MILLILITER(S): 400 TABLET, CHEWABLE ORAL at 21:25

## 2023-12-16 RX ADMIN — Medication 30 MILLIGRAM(S): at 00:18

## 2023-12-16 RX ADMIN — Medication 30 MILLIGRAM(S): at 02:08

## 2023-12-16 RX ADMIN — Medication 975 MILLIGRAM(S): at 22:13

## 2023-12-16 RX ADMIN — OXYCODONE HYDROCHLORIDE 5 MILLIGRAM(S): 5 TABLET ORAL at 03:11

## 2023-12-16 RX ADMIN — Medication 975 MILLIGRAM(S): at 08:46

## 2023-12-16 RX ADMIN — Medication 30 MILLIGRAM(S): at 08:34

## 2023-12-16 RX ADMIN — Medication 50 MILLIGRAM(S): at 04:50

## 2023-12-16 RX ADMIN — Medication 975 MILLIGRAM(S): at 21:24

## 2023-12-16 RX ADMIN — Medication 975 MILLIGRAM(S): at 15:42

## 2023-12-16 RX ADMIN — SODIUM CHLORIDE 100 MILLILITER(S): 9 INJECTION INTRAMUSCULAR; INTRAVENOUS; SUBCUTANEOUS at 07:58

## 2023-12-16 RX ADMIN — Medication 50 GM/HR: at 08:05

## 2023-12-16 RX ADMIN — Medication 30 MILLIGRAM(S): at 12:13

## 2023-12-16 RX ADMIN — Medication 975 MILLIGRAM(S): at 03:11

## 2023-12-16 RX ADMIN — ENOXAPARIN SODIUM 40 MILLIGRAM(S): 100 INJECTION SUBCUTANEOUS at 08:46

## 2023-12-16 NOTE — CONSULT NOTE ADULT - TIME BILLING
Pt is a 24 y/o woman c asthma, cardiac arrest, gHTN reported in afib during labor. Pt is a 22 y/o woman c asthma, cardiac arrest, gHTN reported in afib during labor.

## 2023-12-16 NOTE — CONSULT NOTE ADULT - ATTENDING COMMENTS
23F  with PMHx of Bipolar disorder, manic depression, ADHD, SMA syndrome, previous hx of cardiac arrest s/p delivery 2016, asthma (Flovent and PRN albuterol) at 34w0d, was diagnosed with gestational HTN at [facility] who presented for  contracts. Pt is now s/p  12/15. Course complicated by PEC with SF, requiring IV magnesium. Post op, pt was noted to be hypothermic to 93*F, as well as in new onset Afib. Cardiology consulted for management of Afib which converted to NSR spontaneously. Pt met SIRS criteria with lactic acidosis 2.3- .1.3 after hydration, and given iv abx with Ampicillin, Cefazolin and Azithromycin with pruritis ( no rash) and given Benadryl 50mg IM. Medicine consulted  () for management of post-op hypothermia/sepsis and hyponatremia.     Chart, labs, EKG reviewed. Pt interviewed and examined with Dr. Busch.     General: young woman NAD AAOx3, Skin: no rash, HEENT: NC/AT, no facial/nack/ tongue edema, Heart: RRR, normal S1 and S2, no S3/murmurs, Lungs; clear to auscultate b/l, no wheezing, rales or rhonchi, abd: ND, normal BS,  site with sterile stripes in place ext: no C/C/E, neuro: non focal     # SIRS  # Leukocytosis   # lactic acidosis   - no clinical evidence of occult infection, UA, CXR, negative   - would observe off antibiotic  - HIV negative ( 23)  - no protein gap   - trend WBC 16K->13K, likely reactive leukocytosis   - lactate normalized 2.3-> 1.3    # hypothermia  - likely related to spinal anesthesia fro    - supportive care     # Hyponatremia   - urine lytes no evidence of SIADH  - encourage oral hydration   - trend Na 127-> 130     # New onset AFib  # gHTN  # SMA syndrome   - Cardiology consult appreciated   - obtain TTE   - c/w Nifedipime XL 30mg po daily, may titrate to 60mg daily if SBP>140's persistently   - collaterals for the SMA syndrome ( pt follows with Vascular surgery at [facility])     POC as d/w  resident Dr. Busch, Med consult tea, will follow. Thank you. 23F  with PMHx of Bipolar disorder, manic depression, ADHD, SMA syndrome, previous hx of cardiac arrest s/p delivery 2016, asthma (Flovent and PRN albuterol) at 34w0d, was diagnosed with gestational HTN at [facility] who presented for  contracts. Pt is now s/p  12/15. Course complicated by PEC with SF, requiring IV magnesium. Post op, pt was noted to be hypothermic to 93*F, as well as in new onset Afib. Cardiology consulted for management of Afib which converted to NSR spontaneously. Pt met SIRS criteria with lactic acidosis 2.3- .1.3 after hydration, and given iv abx with Ampicillin, Cefazolin and Azithromycin with pruritis ( no rash) and given Benadryl 50mg IM. Medicine consulted  () for management of post-op hypothermia/sepsis and hyponatremia.     Chart, labs, EKG reviewed. Pt interviewed and examined with Dr. Busch.     General: young woman NAD AAOx3, Skin: no rash, HEENT: NC/AT, no facial/nack/ tongue edema, Heart: RRR, normal S1 and S2, no S3/murmurs, Lungs; clear to auscultate b/l, no wheezing, rales or rhonchi, abd: ND, normal BS,  site with sterile stripes in place ext: no C/C/E, neuro: non focal     # SIRS  # Leukocytosis   # lactic acidosis   - no clinical evidence of occult infection, UA, CXR, negative   - would observe off antibiotic  - HIV negative ( 23)  - no protein gap   - trend WBC 16K->13K, likely reactive leukocytosis   - lactate normalized 2.3-> 1.3    # hypothermia  - likely related to spinal anesthesia fro    - supportive care     # Hyponatremia   - urine lytes no evidence of SIADH  - encourage oral hydration   - trend Na 127-> 130     # New onset AFib  # gHTN  # SMA syndrome   - Cardiology consult appreciated   - obtain TTE   - c/w Nifedipime XL 30mg po daily, may titrate to 60mg daily if SBP>140's persistently   - collaterals for the SMA syndrome ( pt follows with Vascular surgery at [facility])     POC as d/w  resident Dr. Busch, Med consult tea, will follow. Thank you.

## 2023-12-16 NOTE — PROGRESS NOTE ADULT - ASSESSMENT
A&P:   Pt is a 23y yo s/p pC/S, c/b PEC w/ SF, seen for a magnesium check.       1. Preeclampsia: Continue IV Magnesium @1G/hr for 24 hrs post delivery  Denying toxic symptoms currently.   Antihypertensives: nifedipine 30mg qd  Continue to monitor blood pressures.   Next Magnesium check @ 06:30  Last Magnesium serum level 5.8 Follow up next level.     2. GI: regular diet    3. : strict Is and Os, D/C woodard after discontinuation of IV Magnesium

## 2023-12-16 NOTE — CONSULT NOTE ADULT - ASSESSMENT
23F with SMA syndrome, previous hx of cardiac arrest s/p delivery 2016, asthma (Flovent and PRN albuterol) who presented for  contracts. Pt is now s/p Csection 12/15. Course complicated by PEC with SF, requiring IV magnesium. Post op, pt was noted to be hypothermic to 93F, as well as in Afib. Cardiology consulted for management of Afib and Medicine consulted for management of post-op hypothermia and hyponatremia.     #hypothermia  #SIRS  Post procedure, pt hypothermic to 93.8, with HR of . Also with leukocytosis of 15  Pt meets SIRS criteria, no known source. Pt denies fevers, chills, shortness of breath, cough, abdominal pain, n/v/d  suspect hypothermia to be secondary to post-anesthesia from c section, as patient's temperature has now resolved without intervention  tachycardia likely in setting of pain and leukocytosis likely reactive, as also now improving post procedure. lactate now cleared after IVF  pt is s/p Ancef, ampicillin and Azithro post procedure. CXR with no acute infiltrate. UA negative. No signs of cellulitis on exam  recommend monitoring off antibiotics at this time  f/u BCx    #hypovolemic hypoNa  Pt initially presented with sodium of 132, then dropped to Na of 127  urine studies at that time show Na of 30 with urine osm of 180. serum osm of 269  improved after IVF from 127 to 130  encourage PO intake, will continue to trend BMP    #New onset AFib  #gestational HTN  #SMA syndrome   Cardiology consullted- reccs appreciated  obtain TTE   per recommendations, c/w Nifedipine XL 30mg PO qD, may titrate to 60mg daily if SBP>140's persistently     Case discussed and seen with Dr. Matias. Med consult will continue to follow.

## 2023-12-16 NOTE — PROGRESS NOTE ADULT - ASSESSMENT
23y Female POD#1  s/p C/S, w/ PEC w/ SF, hypothermia, arrhythmia and AMS                                       - Neuro/Pain:  toradol atc, tylenol atc, oxy prn  - CV:  VS per routine, AM CBC pending  - Pulm: Encourage ISS & Ambulation  - GI: Advance as tolerated  - : Coates in place, to be removed this morning, TOV this afternoon  - DVT ppx: SCDs, Lovenox 40mg QD  - Dispo: POD #2/3    PEC w/ SF  -no toxic complaints  -normotensive to mild range  -IV Mg  -nifedipine 30mg BID    Hypothermia/AMS  -hypothermia resolved  -sepsis workup in place  -medicine consulted, f/u recs  -CXR, UCx, LE dopplers pending    Arrhythmia  -cardiology consulted  -repeat EKG wnl  -echo this morning  -follow up with Dr. Skaggs as o/p

## 2023-12-16 NOTE — PROGRESS NOTE ADULT - SUBJECTIVE AND OBJECTIVE BOX
Patient evaluated at bedside for clinical magnesium check. Patient still somnolent.    She denies visual disturbances including scotoma, headache and right upper quadrant pain. Also denies nausea/vomiting/epigastric pain/shortness of breath. Pain well controlled.      T(C): 36.8 (12-16-23 @ 00:05), Max: 36.8 (12-16-23 @ 00:05)  HR: 85 (12-16-23 @ 00:05) (77 - 100)  BP: 149/91 (12-16-23 @ 00:05) (103/80 - 149/91)  RR: 18 (12-15-23 @ 22:00) (12 - 18)  SpO2: 99% (12-16-23 @ 00:05) (99% - 100%)  Wt(kg): --    Gen: NAD  Pulm: CTAB  Abd: soft, nontender, no rebound or guarding, no RUQ or epigastric tenderness, liver nonpalpable +BS, fundus palpated   : Coates in place  Ext: Reflexes + , edema                          11.6   15.99 )-----------( 260      ( 15 Dec 2023 19:27 )             32.5     12-15    127<L>  |  95<L>  |  3<L>  ----------------------------<  242<H>  3.8   |  21<L>  |  0.55    Ca    7.2<L>      15 Dec 2023 19:27  Mg     4.4     12-15    TPro  6.2  /  Alb  3.0<L>  /  TBili  0.9  /  DBili  x   /  AST  25  /  ALT  10  /  AlkPhos  145<H>  12-15        12-14-23 @ 07:01  -  12-15-23 @ 07:00  --------------------------------------------------------  IN: 1675 mL / OUT: 1450 mL / NET: 225 mL    12-15-23 @ 07:01  -  12-16-23 @ 01:02  --------------------------------------------------------  IN: 2075 mL / OUT: 4050 mL / NET: -1975 mL

## 2023-12-16 NOTE — CONSULT NOTE ADULT - SUBJECTIVE AND OBJECTIVE BOX
HPI:  24 yo F with PMH asthma, superior mesenteric artery syndrome, who is  at 34w0d presented with  contractions. Per patient, she left AMA from [facility] earlier this evening after presenting with  contractions. Per chart review patient has gHTN, not yet meeting criteria for PEC w/ SF. Patient is complaining of contractions [id number] and rectal pressure. Upon arrive BP is 176/114. She denies leakage of fluid, vaginal bleeding. Endorses fetal movement. Patient denies headache, blurry vision, shortness of breath, RUQ pain. Patient is requesting epidural for contractions. (15 Dec 2023 01:05)        Pt seen and examined at bedside, NAD, ROS s/f ?, otherwise remainder of ROS is unremarkable     ROS: Per HPI    PAST MEDICAL & SURGICAL HISTORY:  ADHD      Asthma      Bipolar disorder      Manic depression      SMAS (superior mesenteric artery syndrome)      S/P D&C (status post dilation and curettage)        SOCIAL HISTORY:  FAMILY HISTORY:    ALLERGIES: 	  shellfish (Anaphylaxis)  No Known Drug Allergies  latex (Rash)          MEDICATIONS:  acetaminophen     Tablet .. 975 milliGRAM(s) Oral <User Schedule>  diphenhydrAMINE 25 milliGRAM(s) Oral every 6 hours PRN  diphtheria/tetanus/pertussis (acellular) Vaccine (Adacel) 0.5 milliLiter(s) IntraMuscular once  enoxaparin Injectable 40 milliGRAM(s) SubCutaneous every 24 hours  ibuprofen  Tablet. 600 milliGRAM(s) Oral every 6 hours  ketorolac   Injectable 30 milliGRAM(s) IV Push every 6 hours  lanolin Ointment 1 Application(s) Topical every 6 hours PRN  magnesium hydroxide Suspension 30 milliLiter(s) Oral two times a day PRN  magnesium sulfate Infusion 1 Gm/Hr IV Continuous <Continuous>  oxyCODONE    IR 5 milliGRAM(s) Oral every 3 hours PRN  oxyCODONE    IR 5 milliGRAM(s) Oral once PRN  oxytocin Infusion 333.333 milliUNIT(s)/Min IV Continuous <Continuous>  simethicone 80 milliGRAM(s) Chew every 4 hours PRN  sodium chloride 0.9%. 1000 milliLiter(s) IV Continuous <Continuous>      T(C): 36.8 (23 @ 00:05), Max: 36.8 (23 @ 00:05)  HR: 85 (23 @ 00:05) (72 - 102)  BP: 149/91 (23 @ 00:05) (103/80 - 159/108)  RR: 18 (12-15-23 @ 22:00) (12 - 18)  SpO2: 99% (23 @ 00:05) (98% - 100%)    23 @ 07:01  -  12-15-23 @ 07:00  --------------------------------------------------------  IN: 1675 mL / OUT: 1450 mL / NET: 225 mL    12-15-23 @ 07:01  -  23 @ 00:28  --------------------------------------------------------  IN: 2075 mL / OUT: 4050 mL / NET: -1975 mL        PHYSICAL EXAM:    Constitutional: resting comfortably in bed; NAD  HEENT: NC/AT,  MMM  Neck: supple;  JVP ? cm H20, JVD +/-  Respiratory: CTA B/L; no W/R/R  Cardiac: +S1/S2; RRR; no M/R/G; PMI non-displaced  Gastrointestinal: soft, NT/ND; no rebound or guarding  Extremities: no clubbing or cyanosis; no peripheral edema  Musculoskeletal: no joint swelling, tenderness or erythema  Vascular: 2+ radial, DP/PT pulses B/L  Dermatologic: skin warm, dry and intact; no rashes, wounds, or scars  Neurologic: AAOx3; CNII-XII grossly intact; no focal deficits        I&O's Summary    14 Dec 2023 07:01  -  15 Dec 2023 07:00  --------------------------------------------------------  IN: 1675 mL / OUT: 1450 mL / NET: 225 mL    15 Dec 2023 07:01  -  16 Dec 2023 00:28  --------------------------------------------------------  IN: 2075 mL / OUT: 4050 mL / NET: -1975 mL      Height (cm): 157.5 (12-15 @ 01:35)  Weight (kg): 68.2 (12-15 @ 01:35)  BMI (kg/m2): 27.5 (12-15 @ :35)  BSA (m2): 1.69 (12-15 @ :35)  LABS:	 	                        11.6   15.99 )-----------( 260      ( 15 Dec 2023 19:27 )             32.5     12-15    127<L>  |  95<L>  |  3<L>  ----------------------------<  242<H>  3.8   |  21<L>  |  0.55    Ca    7.2<L>      15 Dec 2023 19:27  Mg     5.8     12-15    TPro  6.2  /  Alb  3.0<L>  /  TBili  0.9  /  DBili  x   /  AST  25  /  ALT  10  /  AlkPhos  145<H>  12-15        proBNP:   Lipid Profile:   HgA1c:   TSH:     TELEMETRY: 	    ECG:  	  RADIOLOGY:   ECHO:  STRESS:  CATH:   HPI:  24 yo F with PMH asthma, superior mesenteric artery syndrome, who is  at 34w0d presented with  contractions. Per patient, she left AMA from [facility] earlier this evening after presenting with  contractions. Per chart review patient has gHTN, not yet meeting criteria for PEC w/ SF. Patient is complaining of contractions [id number] and rectal pressure. Upon arrive BP is 176/114. She denies leakage of fluid, vaginal bleeding. Endorses fetal movement. Patient denies headache, blurry vision, shortness of breath, RUQ pain. Patient is requesting epidural for contractions. (15 Dec 2023 01:05)        Pt seen and examined at bedside, NAD, ROS s/f ?, otherwise remainder of ROS is unremarkable     ROS: Per HPI    PAST MEDICAL & SURGICAL HISTORY:  ADHD      Asthma      Bipolar disorder      Manic depression      SMAS (superior mesenteric artery syndrome)      S/P D&C (status post dilation and curettage)        SOCIAL HISTORY:  FAMILY HISTORY:    ALLERGIES: 	  shellfish (Anaphylaxis)  No Known Drug Allergies  latex (Rash)          MEDICATIONS:  acetaminophen     Tablet .. 975 milliGRAM(s) Oral <User Schedule>  diphenhydrAMINE 25 milliGRAM(s) Oral every 6 hours PRN  diphtheria/tetanus/pertussis (acellular) Vaccine (Adacel) 0.5 milliLiter(s) IntraMuscular once  enoxaparin Injectable 40 milliGRAM(s) SubCutaneous every 24 hours  ibuprofen  Tablet. 600 milliGRAM(s) Oral every 6 hours  ketorolac   Injectable 30 milliGRAM(s) IV Push every 6 hours  lanolin Ointment 1 Application(s) Topical every 6 hours PRN  magnesium hydroxide Suspension 30 milliLiter(s) Oral two times a day PRN  magnesium sulfate Infusion 1 Gm/Hr IV Continuous <Continuous>  oxyCODONE    IR 5 milliGRAM(s) Oral every 3 hours PRN  oxyCODONE    IR 5 milliGRAM(s) Oral once PRN  oxytocin Infusion 333.333 milliUNIT(s)/Min IV Continuous <Continuous>  simethicone 80 milliGRAM(s) Chew every 4 hours PRN  sodium chloride 0.9%. 1000 milliLiter(s) IV Continuous <Continuous>      T(C): 36.8 (23 @ 00:05), Max: 36.8 (23 @ 00:05)  HR: 85 (23 @ 00:05) (72 - 102)  BP: 149/91 (23 @ 00:05) (103/80 - 159/108)  RR: 18 (12-15-23 @ 22:00) (12 - 18)  SpO2: 99% (23 @ 00:05) (98% - 100%)    23 @ 07:01  -  12-15-23 @ 07:00  --------------------------------------------------------  IN: 1675 mL / OUT: 1450 mL / NET: 225 mL    12-15-23 @ 07:01  -  23 @ 00:28  --------------------------------------------------------  IN: 2075 mL / OUT: 4050 mL / NET: -1975 mL        PHYSICAL EXAM:    Constitutional: resting comfortably in bed; NAD  HEENT: NC/AT,  MMM  Neck: supple;  JVP ? cm H20, JVD +/-  Respiratory: CTA B/L; no W/R/R  Cardiac: +S1/S2; RRR; no M/R/G; PMI non-displaced  Gastrointestinal: soft, NT/ND; no rebound or guarding  Extremities: no clubbing or cyanosis; no peripheral edema  Musculoskeletal: no joint swelling, tenderness or erythema  Vascular: 2+ radial, DP/PT pulses B/L  Dermatologic: skin warm, dry and intact; no rashes, wounds, or scars  Neurologic: AAOx3; CNII-XII grossly intact; no focal deficits        I&O's Summary    14 Dec 2023 07:01  -  15 Dec 2023 07:00  --------------------------------------------------------  IN: 1675 mL / OUT: 1450 mL / NET: 225 mL    15 Dec 2023 07:01  -  16 Dec 2023 00:28  --------------------------------------------------------  IN: 2075 mL / OUT: 4050 mL / NET: -1975 mL      Height (cm): 157.5 (12-15 @ 01:35)  Weight (kg): 68.2 (12-15 @ 01:35)  BMI (kg/m2): 27.5 (12-15 @ :35)  BSA (m2): 1.69 (12-15 @ :35)  LABS:	 	                        11.6   15.99 )-----------( 260      ( 15 Dec 2023 19:27 )             32.5     12-15    127<L>  |  95<L>  |  3<L>  ----------------------------<  242<H>  3.8   |  21<L>  |  0.55    Ca    7.2<L>      15 Dec 2023 19:27  Mg     5.8     12-15    TPro  6.2  /  Alb  3.0<L>  /  TBili  0.9  /  DBili  x   /  AST  25  /  ALT  10  /  AlkPhos  145<H>  12-15        proBNP:   Lipid Profile:   HgA1c:   TSH:     TELEMETRY: 	    ECG:  	  RADIOLOGY:   ECHO:  STRESS:  CATH:   HPI:  22 yo F with PMH asthma, superior mesenteric artery syndrome, cardiac arrest after previous delivery, who is  at 34w0d presented with  contractions. Per patient, she left AMA from [facility] earlier this evening after presenting with  contractions. Was diagnosed with gHTN yesterday at [facility] and noted to have PEC with SF here with a BP of 176/114 on arrival. S/p  today. EKG prior to labor showed atrial fibrillation show cardiology consulted.     Per pt, she was diagnosed with SMA syndrome at [facility] and was seeing vascular surgery with plan for possible surgical intervention, but then she became pregnant so surgery canceled. She also notes that after giving birth to her son in 2016 she had a cardiac arrest s/p CPR. Does not know underlying cause. Does not follow with a cardiologist but wants to transfer her care here to [facility]. No family hx of sudden cardiac death, arrhythmia or early CAD but her dad had a CVA in his 50s.    ROS: Per HPI    PAST MEDICAL & SURGICAL HISTORY:  ADHD      Asthma      Bipolar disorder      Manic depression      SMAS (superior mesenteric artery syndrome)      S/P D&C (status post dilation and curettage)        SOCIAL HISTORY:  FAMILY HISTORY:      ALLERGIES: 	  shellfish (Anaphylaxis)  No Known Drug Allergies  latex (Rash)          MEDICATIONS:  acetaminophen     Tablet .. 975 milliGRAM(s) Oral <User Schedule>  diphenhydrAMINE 25 milliGRAM(s) Oral every 6 hours PRN  diphtheria/tetanus/pertussis (acellular) Vaccine (Adacel) 0.5 milliLiter(s) IntraMuscular once  enoxaparin Injectable 40 milliGRAM(s) SubCutaneous every 24 hours  ibuprofen  Tablet. 600 milliGRAM(s) Oral every 6 hours  ketorolac   Injectable 30 milliGRAM(s) IV Push every 6 hours  lanolin Ointment 1 Application(s) Topical every 6 hours PRN  magnesium hydroxide Suspension 30 milliLiter(s) Oral two times a day PRN  magnesium sulfate Infusion 1 Gm/Hr IV Continuous <Continuous>  oxyCODONE    IR 5 milliGRAM(s) Oral every 3 hours PRN  oxyCODONE    IR 5 milliGRAM(s) Oral once PRN  oxytocin Infusion 333.333 milliUNIT(s)/Min IV Continuous <Continuous>  simethicone 80 milliGRAM(s) Chew every 4 hours PRN  sodium chloride 0.9%. 1000 milliLiter(s) IV Continuous <Continuous>      T(C): 36.8 (23 @ 00:05), Max: 36.8 (23 @ 00:05)  HR: 85 (23 @ 00:05) (72 - 102)  BP: 149/91 (23 @ 00:05) (103/80 - 159/108)  RR: 18 (12-15-23 @ 22:00) (12 - 18)  SpO2: 99% (23 @ 00:05) (98% - 100%)    23 @ 07:01  -  12-15-23 @ 07:00  --------------------------------------------------------  IN: 1675 mL / OUT: 1450 mL / NET: 225 mL    12-15-23 @ 07:01  -  23 @ 00:28  --------------------------------------------------------  IN: 2075 mL / OUT: 4050 mL / NET: -1975 mL        PHYSICAL EXAM:    Constitutional: sitting comfortably in bed; NAD  HEENT: NC/AT,  MMM, no JVD  Respiratory: CTA B/L; no W/R/R  Cardiac: +S1/S2; RRR; no M/R/G  Gastrointestinal: soft, NT/ND  Extremities: no clubbing or cyanosis; no peripheral edema  Dermatologic: skin warm, dry and intact  Neurologic: AAOx3; CNII-XII grossly intact; no focal deficits        I&O's Summary    14 Dec 2023 07:01  -  15 Dec 2023 07:00  --------------------------------------------------------  IN: 1675 mL / OUT: 1450 mL / NET: 225 mL    15 Dec 2023 07:01  -  16 Dec 2023 00:28  --------------------------------------------------------  IN: 2075 mL / OUT: 4050 mL / NET: -1975 mL      Height (cm): 157.5 (12-15 @ 01:35)  Weight (kg): 68.2 (12-15 @ 01:35)  BMI (kg/m2): 27.5 (12-15 @ 01:35)  BSA (m2): 1.69 (12-15 @ 01:35)  LABS:	 	                        11.6   15.99 )-----------( 260      ( 15 Dec 2023 19:27 )             32.5     12-15    127<L>  |  95<L>  |  3<L>  ----------------------------<  242<H>  3.8   |  21<L>  |  0.55    Ca    7.2<L>      15 Dec 2023 19:27  Mg     5.8     12-15    TPro  6.2  /  Alb  3.0<L>  /  TBili  0.9  /  DBili  x   /  AST  25  /  ALT  10  /  AlkPhos  145<H>  12-15     HPI:  24 yo F with PMH asthma, superior mesenteric artery syndrome, cardiac arrest after previous delivery, who is  at 34w0d presented with  contractions. Per patient, she left AMA from [facility] earlier this evening after presenting with  contractions. Was diagnosed with gHTN yesterday at [facility] [place], operated by [facility] and noted to have PEC with SF here with a BP of 176/114 on arrival. S/p  today. EKG prior to labor showed atrial fibrillation show cardiology consulted.     Per pt, she was diagnosed with SMA syndrome at [facility], [place], operated by [facility] and was seeing vascular surgery with plan for possible surgical intervention, but then she became pregnant so surgery canceled. She also notes that after giving birth to her son in 2016 she had a cardiac arrest s/p CPR. Does not know underlying cause. Does not follow with a cardiologist but wants to transfer her care here to [facility]. No family hx of sudden cardiac death, arrhythmia or early CAD but her dad had a CVA in his 50s.    ROS: Per HPI    PAST MEDICAL & SURGICAL HISTORY:  ADHD      Asthma      Bipolar disorder      Manic depression      SMAS (superior mesenteric artery syndrome)      S/P D&C (status post dilation and curettage)        SOCIAL HISTORY:  FAMILY HISTORY:      ALLERGIES: 	  shellfish (Anaphylaxis)  No Known Drug Allergies  latex (Rash)          MEDICATIONS:  acetaminophen     Tablet .. 975 milliGRAM(s) Oral <User Schedule>  diphenhydrAMINE 25 milliGRAM(s) Oral every 6 hours PRN  diphtheria/tetanus/pertussis (acellular) Vaccine (Adacel) 0.5 milliLiter(s) IntraMuscular once  enoxaparin Injectable 40 milliGRAM(s) SubCutaneous every 24 hours  ibuprofen  Tablet. 600 milliGRAM(s) Oral every 6 hours  ketorolac   Injectable 30 milliGRAM(s) IV Push every 6 hours  lanolin Ointment 1 Application(s) Topical every 6 hours PRN  magnesium hydroxide Suspension 30 milliLiter(s) Oral two times a day PRN  magnesium sulfate Infusion 1 Gm/Hr IV Continuous <Continuous>  oxyCODONE    IR 5 milliGRAM(s) Oral every 3 hours PRN  oxyCODONE    IR 5 milliGRAM(s) Oral once PRN  oxytocin Infusion 333.333 milliUNIT(s)/Min IV Continuous <Continuous>  simethicone 80 milliGRAM(s) Chew every 4 hours PRN  sodium chloride 0.9%. 1000 milliLiter(s) IV Continuous <Continuous>      T(C): 36.8 (23 @ 00:05), Max: 36.8 (23 @ 00:05)  HR: 85 (23 @ 00:05) (72 - 102)  BP: 149/91 (23 @ 00:05) (103/80 - 159/108)  RR: 18 (12-15-23 @ 22:00) (12 - 18)  SpO2: 99% (23 @ 00:05) (98% - 100%)    23 @ 07:01  -  12-15-23 @ 07:00  --------------------------------------------------------  IN: 1675 mL / OUT: 1450 mL / NET: 225 mL    12-15-23 @ 07:01  -  23 @ 00:28  --------------------------------------------------------  IN: 2075 mL / OUT: 4050 mL / NET: -1975 mL        PHYSICAL EXAM:    Constitutional: sitting comfortably in bed; NAD  HEENT: NC/AT,  MMM, no JVD  Respiratory: CTA B/L; no W/R/R  Cardiac: +S1/S2; RRR; no M/R/G  Gastrointestinal: soft, NT/ND  Extremities: no clubbing or cyanosis; no peripheral edema  Dermatologic: skin warm, dry and intact  Neurologic: AAOx3; CNII-XII grossly intact; no focal deficits        I&O's Summary    14 Dec 2023 07:01  -  15 Dec 2023 07:00  --------------------------------------------------------  IN: 1675 mL / OUT: 1450 mL / NET: 225 mL    15 Dec 2023 07:01  -  16 Dec 2023 00:28  --------------------------------------------------------  IN: 2075 mL / OUT: 4050 mL / NET: -1975 mL      Height (cm): 157.5 (12-15 @ 01:35)  Weight (kg): 68.2 (12-15 @ 01:35)  BMI (kg/m2): 27.5 (12-15 @ 01:35)  BSA (m2): 1.69 (12-15 @ 01:35)  LABS:	 	                        11.6   15.99 )-----------( 260      ( 15 Dec 2023 19:27 )             32.5     12-15    127<L>  |  95<L>  |  3<L>  ----------------------------<  242<H>  3.8   |  21<L>  |  0.55    Ca    7.2<L>      15 Dec 2023 19:27  Mg     5.8     12-15    TPro  6.2  /  Alb  3.0<L>  /  TBili  0.9  /  DBili  x   /  AST  25  /  ALT  10  /  AlkPhos  145<H>  12-15     HPI:  22 yo F with PMH asthma, superior mesenteric artery syndrome, cardiac arrest after previous delivery, who is  at 34w0d presented with  contractions. Per patient, she left AMA from [facility] earlier this evening after presenting with  contractions. Was diagnosed with gHTN yesterday at [facility], [place], operated by [facility] and noted to have PEC with SF here with a BP of 176/114 on arrival. S/p  today. EKG prior to labor showed atrial fibrillation show cardiology consulted.     Per pt, she was diagnosed with SMA syndrome at [facility], [place], operated by [facility] and was seeing vascular surgery with plan for possible surgical intervention, but then she became pregnant so surgery canceled. She also notes that after giving birth to her son in 2016 she had a cardiac arrest s/p CPR. Does not know underlying cause. Does not follow with a cardiologist but wants to transfer her care here to [facility]. No family hx of sudden cardiac death, arrhythmia or early CAD but her dad had a CVA in his 50s.    Currently denies any chest pain, SOB, palpitations, LE edema.     ROS: Per HPI    PAST MEDICAL & SURGICAL HISTORY:  ADHD      Asthma      Bipolar disorder      Manic depression      SMAS (superior mesenteric artery syndrome)      S/P D&C (status post dilation and curettage)        SOCIAL HISTORY:  FAMILY HISTORY:      ALLERGIES: 	  shellfish (Anaphylaxis)  No Known Drug Allergies  latex (Rash)          MEDICATIONS:  acetaminophen     Tablet .. 975 milliGRAM(s) Oral <User Schedule>  diphenhydrAMINE 25 milliGRAM(s) Oral every 6 hours PRN  diphtheria/tetanus/pertussis (acellular) Vaccine (Adacel) 0.5 milliLiter(s) IntraMuscular once  enoxaparin Injectable 40 milliGRAM(s) SubCutaneous every 24 hours  ibuprofen  Tablet. 600 milliGRAM(s) Oral every 6 hours  ketorolac   Injectable 30 milliGRAM(s) IV Push every 6 hours  lanolin Ointment 1 Application(s) Topical every 6 hours PRN  magnesium hydroxide Suspension 30 milliLiter(s) Oral two times a day PRN  magnesium sulfate Infusion 1 Gm/Hr IV Continuous <Continuous>  oxyCODONE    IR 5 milliGRAM(s) Oral every 3 hours PRN  oxyCODONE    IR 5 milliGRAM(s) Oral once PRN  oxytocin Infusion 333.333 milliUNIT(s)/Min IV Continuous <Continuous>  simethicone 80 milliGRAM(s) Chew every 4 hours PRN  sodium chloride 0.9%. 1000 milliLiter(s) IV Continuous <Continuous>      T(C): 36.8 (23 @ 00:05), Max: 36.8 (23 @ 00:05)  HR: 85 (23 @ 00:05) (72 - 102)  BP: 149/91 (23 @ 00:05) (103/80 - 159/108)  RR: 18 (12-15-23 @ 22:00) (12 - 18)  SpO2: 99% (23 @ 00:05) (98% - 100%)    23 @ 07:01  -  12-15-23 @ 07:00  --------------------------------------------------------  IN: 1675 mL / OUT: 1450 mL / NET: 225 mL    12-15-23 @ 07:01  -  23 @ 00:28  --------------------------------------------------------  IN: 2075 mL / OUT: 4050 mL / NET: -1975 mL        PHYSICAL EXAM:    Constitutional: sitting comfortably in bed; NAD  HEENT: NC/AT,  MMM, no JVD  Respiratory: CTA B/L; no W/R/R  Cardiac: +S1/S2; RRR; no M/R/G  Gastrointestinal: soft, NT/ND  Extremities: no clubbing or cyanosis; no peripheral edema  Dermatologic: skin warm, dry and intact  Neurologic: AAOx3; CNII-XII grossly intact; no focal deficits        I&O's Summary    14 Dec 2023 07:01  -  15 Dec 2023 07:00  --------------------------------------------------------  IN: 1675 mL / OUT: 1450 mL / NET: 225 mL    15 Dec 2023 07:01  -  16 Dec 2023 00:28  --------------------------------------------------------  IN: 2075 mL / OUT: 4050 mL / NET: -1975 mL      Height (cm): 157.5 (12-15 @ 01:35)  Weight (kg): 68.2 (12-15 @ 01:35)  BMI (kg/m2): 27.5 (12-15 @ 01:35)  BSA (m2): 1.69 (12-15 @ :35)  LABS:	 	                        11.6   15.99 )-----------( 260      ( 15 Dec 2023 19:27 )             32.5     12-15    127<L>  |  95<L>  |  3<L>  ----------------------------<  242<H>  3.8   |  21<L>  |  0.55    Ca    7.2<L>      15 Dec 2023 19:27  Mg     5.8     12-15    TPro  6.2  /  Alb  3.0<L>  /  TBili  0.9  /  DBili  x   /  AST  25  /  ALT  10  /  AlkPhos  145<H>  12-15     HPI:  24 yo F with PMH asthma, superior mesenteric artery syndrome, cardiac arrest after previous delivery, who is  at 34w0d presented with  contractions. Per patient, she left AMA from [facility] earlier this evening after presenting with  contractions. Was diagnosed with gHTN yesterday at [facility] and noted to have PEC with SF here with a BP of 176/114 on arrival. S/p  today. EKG prior to labor showed atrial fibrillation show cardiology consulted.     Per pt, she was diagnosed with SMA syndrome at [facility] and was seeing vascular surgery with plan for possible surgical intervention, but then she became pregnant so surgery canceled. She also notes that after giving birth to her son in 2016 she had a cardiac arrest s/p CPR. Does not know underlying cause. Does not follow with a cardiologist but wants to transfer her care here to [facility]. No family hx of sudden cardiac death, arrhythmia or early CAD but her dad had a CVA in his 50s.    Currently denies any chest pain, SOB, palpitations, LE edema.     ROS: Per HPI    PAST MEDICAL & SURGICAL HISTORY:  ADHD      Asthma      Bipolar disorder      Manic depression      SMAS (superior mesenteric artery syndrome)      S/P D&C (status post dilation and curettage)        SOCIAL HISTORY:  FAMILY HISTORY:      ALLERGIES: 	  shellfish (Anaphylaxis)  No Known Drug Allergies  latex (Rash)          MEDICATIONS:  acetaminophen     Tablet .. 975 milliGRAM(s) Oral <User Schedule>  diphenhydrAMINE 25 milliGRAM(s) Oral every 6 hours PRN  diphtheria/tetanus/pertussis (acellular) Vaccine (Adacel) 0.5 milliLiter(s) IntraMuscular once  enoxaparin Injectable 40 milliGRAM(s) SubCutaneous every 24 hours  ibuprofen  Tablet. 600 milliGRAM(s) Oral every 6 hours  ketorolac   Injectable 30 milliGRAM(s) IV Push every 6 hours  lanolin Ointment 1 Application(s) Topical every 6 hours PRN  magnesium hydroxide Suspension 30 milliLiter(s) Oral two times a day PRN  magnesium sulfate Infusion 1 Gm/Hr IV Continuous <Continuous>  oxyCODONE    IR 5 milliGRAM(s) Oral every 3 hours PRN  oxyCODONE    IR 5 milliGRAM(s) Oral once PRN  oxytocin Infusion 333.333 milliUNIT(s)/Min IV Continuous <Continuous>  simethicone 80 milliGRAM(s) Chew every 4 hours PRN  sodium chloride 0.9%. 1000 milliLiter(s) IV Continuous <Continuous>      T(C): 36.8 (23 @ 00:05), Max: 36.8 (23 @ 00:05)  HR: 85 (23 @ 00:05) (72 - 102)  BP: 149/91 (23 @ 00:05) (103/80 - 159/108)  RR: 18 (12-15-23 @ 22:00) (12 - 18)  SpO2: 99% (23 @ 00:05) (98% - 100%)    23 @ 07:01  -  12-15-23 @ 07:00  --------------------------------------------------------  IN: 1675 mL / OUT: 1450 mL / NET: 225 mL    12-15-23 @ 07:01  -  23 @ 00:28  --------------------------------------------------------  IN: 2075 mL / OUT: 4050 mL / NET: -1975 mL        PHYSICAL EXAM:    Constitutional: sitting comfortably in bed; NAD  HEENT: NC/AT,  MMM, no JVD  Respiratory: CTA B/L; no W/R/R  Cardiac: +S1/S2; RRR; no M/R/G  Gastrointestinal: soft, NT/ND  Extremities: no clubbing or cyanosis; no peripheral edema  Dermatologic: skin warm, dry and intact  Neurologic: AAOx3; CNII-XII grossly intact; no focal deficits        I&O's Summary    14 Dec 2023 07:01  -  15 Dec 2023 07:00  --------------------------------------------------------  IN: 1675 mL / OUT: 1450 mL / NET: 225 mL    15 Dec 2023 07:01  -  16 Dec 2023 00:28  --------------------------------------------------------  IN: 2075 mL / OUT: 4050 mL / NET: -1975 mL      Height (cm): 157.5 (12-15 @ 01:35)  Weight (kg): 68.2 (12-15 @ 01:35)  BMI (kg/m2): 27.5 (12-15 @ 01:35)  BSA (m2): 1.69 (12-15 @ :35)  LABS:	 	                        11.6   15.99 )-----------( 260      ( 15 Dec 2023 19:27 )             32.5     12-15    127<L>  |  95<L>  |  3<L>  ----------------------------<  242<H>  3.8   |  21<L>  |  0.55    Ca    7.2<L>      15 Dec 2023 19:27  Mg     5.8     12-15    TPro  6.2  /  Alb  3.0<L>  /  TBili  0.9  /  DBili  x   /  AST  25  /  ALT  10  /  AlkPhos  145<H>  12-15     [facility] Counseling: Rhofade is a topical medication which can decrease superficial blood flow where applied. Side effects are uncommon and include stinging, redness and allergic reactions.

## 2023-12-16 NOTE — CONSULT NOTE ADULT - SUBJECTIVE AND OBJECTIVE BOX
23F with SMA syndrome, previous hx of cardiac arrest s/p delivery 2016, asthma (Flovent and PRN albuterol) who presented for  contracts. Pt is now s/p Csection 12/15. Course complicated by PEC with SF, requiring IV magnesium. Post op, pt was noted to be hypothermic to 93F, as well as in Afib. Cardiology consulted for management of Afib and Medicine consulted for management of post-op hypothermia and hyponatremia.     Pt denies a hx of hyponatremia in the past and states she has been eating and drinking well post procedure. Prior to hospitalization, pt denies fevers, chills, chest pain, shortness of breath, rash, diarrhea.     At present, pt states she feels well and has been having some mild tenderness and pain at  site. No drainage noted. Also notes after her procedure, she started to have total body itching, no associated rash. States she received Benadryl which only mildly helped with her symptoms. Denies hx of similar symptoms in the past.     PAST MEDICAL/SURGICAL HISTORY  PAST MEDICAL & SURGICAL HISTORY:  ADHD      Asthma      Bipolar disorder      Manic depression      SMAS (superior mesenteric artery syndrome)      S/P D&C (status post dilation and curettage)          REVIEW OF SYSTEMS:  CONSTITUTIONAL: No fever, weight loss, or fatigue  EYES: No eye pain, visual disturbances, or discharge  ENMT:  No difficulty hearing, tinnitus, vertigo; No sinus or throat pain  NECK: No pain or stiffness  BREASTS: No pain, masses, or nipple discharge  RESPIRATORY: No cough, wheezing, chills or hemoptysis; No shortness of breath  CARDIOVASCULAR: No chest pain, palpitations, dizziness, or leg swelling  GASTROINTESTINAL: No abdominal or epigastric pain. No nausea, vomiting, or hematemesis; No diarrhea or constipation. No melena or hematochezia.  GENITOURINARY: No dysuria, frequency, hematuria, or incontinence  NEUROLOGICAL: No headaches, memory loss, loss of strength, numbness, or tremors  SKIN: No itching, burning, rashes, or lesions   LYMPH NODES: No enlarged glands  ENDOCRINE: No heat or cold intolerance; No hair loss  MUSCULOSKELETAL: No joint pain or swelling; No muscle, back, or extremity pain  PSYCHIATRIC: No depression, anxiety, mood swings, or difficulty sleeping  HEME/LYMPH: No easy bruising, or bleeding gums  ALLERY AND IMMUNOLOGIC: No hives or eczema    T(C): 37 (23 @ 10:00), Max: 37 (23 @ 10:00)  HR: 73 (23 @ 10:00) (64 - 102)  BP: 125/84 (23 @ 10:00) (103/80 - 155/90)  RR: 18 (23 @ 10:00) (12 - 18)  SpO2: 100% (23 @ 10:00) (98% - 100%)  Wt(kg): --Vital Signs Last 24 Hrs  T(C): 37 (16 Dec 2023 10:00), Max: 37 (16 Dec 2023 10:00)  T(F): 98.6 (16 Dec 2023 10:00), Max: 98.6 (16 Dec 2023 10:00)  HR: 73 (16 Dec 2023 10:00) (64 - 102)  BP: 125/84 (16 Dec 2023 10:00) (103/80 - 155/90)  BP(mean): --  RR: 18 (16 Dec 2023 10:00) (12 - 18)  SpO2: 100% (16 Dec 2023 10:00) (98% - 100%)    Parameters below as of 16 Dec 2023 10:00  Patient On (Oxygen Delivery Method): room air        PHYSICAL EXAM:  GENERAL: NAD, well-groomed, well-developed  HEAD:  Atraumatic, Normocephalic  EYES: EOMI, PERRLA, conjunctiva and sclera clear  ENMT: No tonsillar erythema, exudates, or enlargement; Moist mucous membranes, Good dentition, No lesions  NECK: Supple, No JVD, Normal thyroid  NERVOUS SYSTEM:  Alert & Oriented X3, Good concentration; Motor Strength 5/5 B/L upper and lower extremities; DTRs 2+ intact and symmetric  CHEST/LUNG: Clear to percussion bilaterally; No rales, rhonchi, wheezing, or rubs  HEART: Regular rate and rhythm; No murmurs, rubs, or gallops  ABDOMEN: Soft, Nontender, Nondistended; Bowel sounds present  EXTREMITIES:  2+ Peripheral Pulses, No clubbing, cyanosis, or edema  LYMPH: No lymphadenopathy noted  SKIN: No rashes or lesions    Consultant(s) Notes Reviewed:  [x ] YES  [ ] NO  Care Discussed with Consultants/Other Providers [ x] YES  [ ] NO    LABS:  CBC   23 @ 05:55  Hematcorit 26.8  Hemoglobin 9.4  Mean Cell Hemoglobin 31.3  Platelet Count-Automated 247  RBC Count 3.00  Red Cell Distrib Width 12.6  Wbc Count 13.08  23 @ 04:16  Hematcorit 26.6  Hemoglobin 9.6  Mean Cell Hemoglobin 31.3  Platelet Count-Automated 255  RBC Count 3.07  Red Cell Distrib Width 12.5  Wbc Count 13.97  12-15-23 @ 19:27  Hematcorit 32.5  Hemoglobin 11.6  Mean Cell Hemoglobin 31.3  Platelet Count-Automated 260  RBC Count 3.71  Red Cell Distrib Width 12.4  Wbc Count 15.99      BMP  23 @ 05:55  Anion Gap. Serum 10  Blood Urea Nitrogen,Serm 2  Calcium, Total Serum 7.3  Carbon Dioxide, Serum 20  Chloride, Serum 100  Creatinine, Serum 0.60  eGFR in  --  eGFR in Non Afican American --  Gloucose, serum 91  Potassium, Serum 4.0  Sodium, Serum 130              23 @ 04:16  Anion Gap. Serum 7  Blood Urea Nitrogen,Serm 3  Calcium, Total Serum 7.3  Carbon Dioxide, Serum 24  Chloride, Serum 99  Creatinine, Serum 0.54  eGFR in  --  eGFR in Non Afican American --  Gloucose, serum 101  Potassium, Serum 4.1  Sodium, Serum 130              12-15-23 @ 19:27  Anion Gap. Serum 11  Blood Urea Nitrogen,Serm 3  Calcium, Total Serum 7.2  Carbon Dioxide, Serum 21  Chloride, Serum 95  Creatinine, Serum 0.55  eGFR in  --  eGFR in Non Afican American --  Gloucose, serum 242  Potassium, Serum 3.8  Sodium, Serum 127              12-15-23 @ 04:58  Anion Gap. Serum 10  Blood Urea Nitrogen,Serm 4  Calcium, Total Serum 8.3  Carbon Dioxide, Serum 25  Chloride, Serum 97  Creatinine, Serum 0.65  eGFR in  --  eGFR in Non Afican American --  Gloucose, serum 98  Potassium, Serum 4.1  Sodium, Serum 132                  CMP  23 @ 05:55  Lorena Aminotransferase(ALT/SGPT)10  Albumin, Serum 2.9  Alkaline Phosphatase, Serum 122  Anion Gap, Serum 10  Aspartate Aminotransferase (AST/SGOT)30  Bilirubin Total, Serum 0.9  Blood Urea Nitrogen, Serum 2  Calcium,Total Serum 7.3  Carbon Dioxide, Serum 20  Chloride, Serum 100  Creatinine, Serum 0.60  eGFR if  --  eGFR if Non African American --  Glucose, Serum 91  Potassium, Serum 4.0  Protein Total, Serum 5.8  Sodium, Serum 130                      23 @ 04:16  Lorena Aminotransferase(ALT/SGPT)9  Albumin, Serum 3.0  Alkaline Phosphatase, Serum 124  Anion Gap, Serum 7  Aspartate Aminotransferase (AST/SGOT)26  Bilirubin Total, Serum 0.9  Blood Urea Nitrogen, Serum 3  Calcium,Total Serum 7.3  Carbon Dioxide, Serum 24  Chloride, Serum 99  Creatinine, Serum 0.54  eGFR if  --  eGFR if Non African American --  Glucose, Serum 101  Potassium, Serum 4.1  Protein Total, Serum 5.8  Sodium, Serum 130                      12-15-23 @ 19:27  Lorena Aminotransferase(ALT/SGPT)10  Albumin, Serum 3.0  Alkaline Phosphatase, Serum 145  Anion Gap, Serum 11  Aspartate Aminotransferase (AST/SGOT)25  Bilirubin Total, Serum 0.9  Blood Urea Nitrogen, Serum 3  Calcium,Total Serum 7.2  Carbon Dioxide, Serum 21  Chloride, Serum 95  Creatinine, Serum 0.55  eGFR if  --  eGFR if Non African American --  Glucose, Serum 242  Potassium, Serum 3.8  Protein Total, Serum 6.2  Sodium, Serum 127                      12-15-23 @ 04:58  Lorena Aminotransferase(ALT/SGPT)13  Albumin, Serum 4.0  Alkaline Phosphatase, Serum 186  Anion Gap, Serum 10  Aspartate Aminotransferase (AST/SGOT)28  Bilirubin Total, Serum 1.0  Blood Urea Nitrogen, Serum 4  Calcium,Total Serum 8.3  Carbon Dioxide, Serum 25  Chloride, Serum 97  Creatinine, Serum 0.65  eGFR if  --  eGFR if Non African American --  Glucose, Serum 98  Potassium, Serum 4.1  Protein Total, Serum 8.2  Sodium, Serum 132                          PT/INR      Amylase/Lipase            RADIOLOGY & ADDITIONAL TESTS:    Imaging Personally Reviewed:  [ ] YES  [ ] NO 23F with SMA syndrome, previous hx of cardiac arrest s/p delivery 2016, asthma (Flovent and PRN albuterol) who presented for  contracts. Pt is now s/p Csection 12/15. Course complicated by PEC with SF, requiring IV magnesium. Post op, pt was noted to be hypothermic to 93F, as well as in Afib. Cardiology consulted for management of Afib and Medicine consulted for management of post-op hypothermia and hyponatremia.     Pt denies a hx of hyponatremia in the past and states she has been eating and drinking well post procedure. Prior to hospitalization, pt denies fevers, chills, chest pain, shortness of breath, rash, diarrhea.     At present, pt states she feels well and has been having some mild tenderness and pain at  site. No drainage noted. Also notes after her procedure, she started to have total body itching, no associated rash. States she received Benadryl which only mildly helped with her symptoms. Denies hx of similar symptoms in the past.     PAST MEDICAL/SURGICAL HISTORY  PAST MEDICAL & SURGICAL HISTORY:  ADHD      Asthma      Bipolar disorder      Manic depression      SMAS (superior mesenteric artery syndrome)      S/P D&C (status post dilation and curettage)      REVIEW OF SYSTEMS:  CONSTITUTIONAL: No fever, chills  ENMT:  No sinus or throat pain  RESPIRATORY: No cough, wheezing, chills or hemoptysis; No shortness of breath  CARDIOVASCULAR: No chest pain, palpitations, dizziness, or leg swelling  GASTROINTESTINAL: No abdominal or epigastric pain. No nausea, vomiting, or hematemesis; No diarrhea or constipation. No melena or hematochezia.  GENITOURINARY: No dysuria, frequency  NEUROLOGICAL: No headaches, loss of strength, numbness, or tremors  SKIN: +total body itching      T(C): 37 (23 @ 10:00), Max: 37 (23 @ 10:00)  HR: 73 (23 @ 10:00) (64 - 102)  BP: 125/84 (23 @ 10:00) (103/80 - 155/90)  RR: 18 (23 @ 10:00) (12 - 18)  SpO2: 100% (23 @ 10:00) (98% - 100%)  Wt(kg): --Vital Signs Last 24 Hrs  T(C): 37 (16 Dec 2023 10:00), Max: 37 (16 Dec 2023 10:00)  T(F): 98.6 (16 Dec 2023 10:00), Max: 98.6 (16 Dec 2023 10:00)  HR: 73 (16 Dec 2023 10:00) (64 - 102)  BP: 125/84 (16 Dec 2023 10:00) (103/80 - 155/90)  BP(mean): --  RR: 18 (16 Dec 2023 10:00) (12 - 18)  SpO2: 100% (16 Dec 2023 10:00) (98% - 100%)    Parameters below as of 16 Dec 2023 10:00  Patient On (Oxygen Delivery Method): room air        PHYSICAL EXAM:  GENERAL: NAD, well-groomed, well-developed  HEAD:  Atraumatic, Normocephalic  EYES: EOMI, PERRLA, conjunctiva and sclera clear  ENMT: No tonsillar erythema, exudates, or enlargement; Moist mucous membranes, Good dentition, No lesions  NECK: Supple, No JVD, Normal thyroid  NERVOUS SYSTEM:  Alert & Oriented X3, Good concentration; Motor Strength 5/5 B/L upper and lower extremities; DTRs 2+ intact and symmetric  CHEST/LUNG: Clear to percussion bilaterally; No rales, rhonchi, wheezing, or rubs  HEART: Regular rate and rhythm; No murmurs, rubs, or gallops  ABDOMEN: Soft, Nontender, Nondistended; Bowel sounds present  EXTREMITIES:  2+ Peripheral Pulses, No clubbing, cyanosis, or edema  LYMPH: No lymphadenopathy noted  SKIN: No rashes or lesions    Consultant(s) Notes Reviewed:  [x ] YES  [ ] NO  Care Discussed with Consultants/Other Providers [ x] YES  [ ] NO    LABS:  CBC   23 @ 05:55  Hematcorit 26.8  Hemoglobin 9.4  Mean Cell Hemoglobin 31.3  Platelet Count-Automated 247  RBC Count 3.00  Red Cell Distrib Width 12.6  Wbc Count 13.08  23 @ 04:16  Hematcorit 26.6  Hemoglobin 9.6  Mean Cell Hemoglobin 31.3  Platelet Count-Automated 255  RBC Count 3.07  Red Cell Distrib Width 12.5  Wbc Count 13.97  12-15-23 @ 19:27  Hematcorit 32.5  Hemoglobin 11.6  Mean Cell Hemoglobin 31.3  Platelet Count-Automated 260  RBC Count 3.71  Red Cell Distrib Width 12.4  Wbc Count 15.99      BMP  23 @ 05:55  Anion Gap. Serum 10  Blood Urea Nitrogen,Serm 2  Calcium, Total Serum 7.3  Carbon Dioxide, Serum 20  Chloride, Serum 100  Creatinine, Serum 0.60  eGFR in  --  eGFR in Non Afican American --  Gloucose, serum 91  Potassium, Serum 4.0  Sodium, Serum 130              23 @ 04:16  Anion Gap. Serum 7  Blood Urea Nitrogen,Serm 3  Calcium, Total Serum 7.3  Carbon Dioxide, Serum 24  Chloride, Serum 99  Creatinine, Serum 0.54  eGFR in  --  eGFR in Non Afican American --  Gloucose, serum 101  Potassium, Serum 4.1  Sodium, Serum 130              12-15-23 @ 19:27  Anion Gap. Serum 11  Blood Urea Nitrogen,Serm 3  Calcium, Total Serum 7.2  Carbon Dioxide, Serum 21  Chloride, Serum 95  Creatinine, Serum 0.55  eGFR in  --  eGFR in Non Afican American --  Gloucose, serum 242  Potassium, Serum 3.8  Sodium, Serum 127              12-15-23 @ 04:58  Anion Gap. Serum 10  Blood Urea Nitrogen,Serm 4  Calcium, Total Serum 8.3  Carbon Dioxide, Serum 25  Chloride, Serum 97  Creatinine, Serum 0.65  eGFR in  --  eGFR in Non Afican American --  Gloucose, serum 98  Potassium, Serum 4.1  Sodium, Serum 132                  CMP  23 @ 05:55  Lorena Aminotransferase(ALT/SGPT)10  Albumin, Serum 2.9  Alkaline Phosphatase, Serum 122  Anion Gap, Serum 10  Aspartate Aminotransferase (AST/SGOT)30  Bilirubin Total, Serum 0.9  Blood Urea Nitrogen, Serum 2  Calcium,Total Serum 7.3  Carbon Dioxide, Serum 20  Chloride, Serum 100  Creatinine, Serum 0.60  eGFR if  --  eGFR if Non African American --  Glucose, Serum 91  Potassium, Serum 4.0  Protein Total, Serum 5.8  Sodium, Serum 130                      23 @ 04:16  Lorena Aminotransferase(ALT/SGPT)9  Albumin, Serum 3.0  Alkaline Phosphatase, Serum 124  Anion Gap, Serum 7  Aspartate Aminotransferase (AST/SGOT)26  Bilirubin Total, Serum 0.9  Blood Urea Nitrogen, Serum 3  Calcium,Total Serum 7.3  Carbon Dioxide, Serum 24  Chloride, Serum 99  Creatinine, Serum 0.54  eGFR if  --  eGFR if Non African American --  Glucose, Serum 101  Potassium, Serum 4.1  Protein Total, Serum 5.8  Sodium, Serum 130                      12-15-23 @ 19:27  Lorena Aminotransferase(ALT/SGPT)10  Albumin, Serum 3.0  Alkaline Phosphatase, Serum 145  Anion Gap, Serum 11  Aspartate Aminotransferase (AST/SGOT)25  Bilirubin Total, Serum 0.9  Blood Urea Nitrogen, Serum 3  Calcium,Total Serum 7.2  Carbon Dioxide, Serum 21  Chloride, Serum 95  Creatinine, Serum 0.55  eGFR if  --  eGFR if Non African American --  Glucose, Serum 242  Potassium, Serum 3.8  Protein Total, Serum 6.2  Sodium, Serum 127                      12-15-23 @ 04:58  Lorena Aminotransferase(ALT/SGPT)13  Albumin, Serum 4.0  Alkaline Phosphatase, Serum 186  Anion Gap, Serum 10  Aspartate Aminotransferase (AST/SGOT)28  Bilirubin Total, Serum 1.0  Blood Urea Nitrogen, Serum 4  Calcium,Total Serum 8.3  Carbon Dioxide, Serum 25  Chloride, Serum 97  Creatinine, Serum 0.65  eGFR if  --  eGFR if Non African American --  Glucose, Serum 98  Potassium, Serum 4.1  Protein Total, Serum 8.2  Sodium, Serum 132                          PT/INR      Amylase/Lipase            RADIOLOGY & ADDITIONAL TESTS:    Imaging Personally Reviewed:  [ ] YES  [ ] NO

## 2023-12-16 NOTE — CONSULT NOTE ADULT - ASSESSMENT
22 yo F with PMH asthma, superior mesenteric artery syndrome, cardiac arrest after previous delivery, who presented with  contractions at 34 weeks, found to have gHTN and PEC w/ SF, now s/p . Noted to be in AFib during labor so cardiology consulted.    Review of Studies:  EKG 12/15 9AM: Afib RVR, rate 101  EKG  12/15 10pm: NSR, rate 81, no ischemic changes    Recommendations:    #New onset atrial fibrillation  - EKG noted with Afib during labor/prior to OR and now returned to NSR - likely paroxysmal  - HR 80s currently  - CHADSVASC 1 for age, would not start anticoagulation at this time  - can obtain nonurgent TTE  - replete electrolytes for goal K>4, Mg>2      #XX  -  -  -      Recommendations above are preliminary pending discussion with an attending cardiologist  Plan was discussed with primary team  We'll continue to follow, thank you for the consultation      Vicky Ortega   Cardiovascular Disease Fellow  Consult Pager: [phone]         22 yo F with PMH asthma, superior mesenteric artery syndrome, cardiac arrest after previous delivery, who presented with  contractions at 34 weeks, found to have gHTN and PEC w/ SF, now s/p . Noted to be in AFib during labor so cardiology consulted.    Review of Studies:  EKG 12/15 9AM: Afib RVR, rate 101  EKG  12/15 10pm: NSR, rate 81, no ischemic changes    Recommendations:    #New onset atrial fibrillation  - EKG noted with Afib during labor/prior to OR and now returned to NSR - likely paroxysmal  - HR 80s currently  - CHADSVASC 1 for age, would not start anticoagulation at this time  - can obtain nonurgent TTE  - replete electrolytes for goal K>4, Mg>2      #XX  -  -  -      Recommendations above are preliminary pending discussion with an attending cardiologist  Plan was discussed with primary team  We'll continue to follow, thank you for the consultation      Vicky Ortega   Cardiovascular Disease Fellow  Consult Pager: [phone]         22 yo F with PMH asthma, superior mesenteric artery syndrome, cardiac arrest after previous delivery, who presented with  contractions at 34 weeks, found to have gHTN and PEC w/ SF, now s/p . Noted to be in AFib during labor so cardiology consulted.    Review of Studies:  EKG 12/15 9AM: Afib RVR, rate 101  EKG  12/15 10pm: NSR, rate 81, no ischemic changes    Recommendations:    #New onset atrial fibrillation  - EKG noted with Afib during labor/prior to OR and now returned to NSR - likely paroxysmal  - HR 80s currently  - CHADSVASC 1 for age, would not start anticoagulation at this time  - can obtain nonurgent TTE  - replete electrolytes for goal K>4, Mg>2    #gHTN  - BP elevated on arrival to 170/110s  - started on Nifedipine 30mg with improvement in BP to 100-110s/70-80s  - would continue with Nifedipine 30mg daily  - if SBP persistently >140, can increase dose to Nifedipine XL 60mg    #SMA syndrome  - pt can f/u with vascular  - should obtain all of her records/have them sent here if she wants to transfer care    Pt can follow up with Dr. Latonia Skaggs as an outpatient.    Recommendations above are preliminary pending discussion with an attending cardiologist  Plan was discussed with primary team  We'll continue to follow, thank you for the consultation      Vicky Ortega   Cardiovascular Disease Fellow  Consult Pager: [phone]         24 yo F with PMH asthma, superior mesenteric artery syndrome, cardiac arrest after previous delivery, who presented with  contractions at 34 weeks, found to have gHTN and PEC w/ SF, now s/p . Noted to be in AFib during labor so cardiology consulted.    Review of Studies:  EKG 12/15 9AM: Afib RVR, rate 101  EKG  12/15 10pm: NSR, rate 81, no ischemic changes    Recommendations:    #New onset atrial fibrillation  - EKG noted with Afib during labor/prior to OR and now returned to NSR - likely paroxysmal  - HR 80s currently  - CHADSVASC 1 for age, would not start anticoagulation at this time  - can obtain nonurgent TTE  - replete electrolytes for goal K>4, Mg>2    #gHTN  - BP elevated on arrival to 170/110s  - started on Nifedipine 30mg with improvement in BP to 100-110s/70-80s  - would continue with Nifedipine 30mg daily  - if SBP persistently >140, can increase dose to Nifedipine XL 60mg    #SMA syndrome  - pt can f/u with vascular  - should obtain all of her records/have them sent here if she wants to transfer care    Pt can follow up with Dr. Latonia Skaggs as an outpatient.    Recommendations above are preliminary pending discussion with an attending cardiologist  Plan was discussed with primary team  We'll continue to follow, thank you for the consultation      Vicky Ortega   Cardiovascular Disease Fellow  Consult Pager: [phone]         24 yo F with PMH asthma, superior mesenteric artery syndrome, cardiac arrest after previous delivery, who presented with  contractions at 34 weeks, found to have gHTN and PEC w/ SF, now s/p . Noted to be in AFib during labor so cardiology consulted.    Review of Studies:  EKG 12/15 9AM: Afib RVR, rate 101  EKG  12/15 10pm: NSR, rate 81, no ischemic changes    Recommendations:    #New onset atrial fibrillation  - EKG noted with Afib during labor/prior to OR and now returned to NSR - likely paroxysmal  - HR 80s currently  - CHADSVASC 2 for sex/gHTN, would not start anticoagulation at this time  - can obtain nonurgent TTE  - replete electrolytes for goal K>4, Mg>2    #gHTN  - BP elevated on arrival to 170/110s  - started on Nifedipine 30mg with improvement in BP to 100-110s/70-80s  - would continue with Nifedipine 30mg daily  - if SBP persistently >140, can increase dose to Nifedipine XL 60mg    #SMA syndrome  - pt can f/u with vascular  - should obtain all of her records/have them sent here if she wants to transfer care    Pt can follow up with Dr. Latonia Skaggs as an outpatient.    Recommendations above are preliminary pending discussion with an attending cardiologist  Plan was discussed with primary team  We'll continue to follow, thank you for the consultation      Vicky Ortega   Cardiovascular Disease Fellow  Consult Pager: [phone]         24 yo F with PMH asthma, superior mesenteric artery syndrome, cardiac arrest after previous delivery, who presented with  contractions at 34 weeks, found to have gHTN and PEC w/ SF, now s/p . Noted to be in AFib during labor so cardiology consulted.    Review of Studies:  EKG 12/15 9AM: Afib RVR, rate 101  EKG  12/15 10pm: NSR, rate 81, no ischemic changes    Recommendations:    #New onset atrial fibrillation  - EKG noted with Afib during labor/prior to OR and now returned to NSR - likely paroxysmal  - HR 80s currently  - CHADSVASC 2 for sex/gHTN, would not start anticoagulation at this time  - can obtain nonurgent TTE  - replete electrolytes for goal K>4, Mg>2    #gHTN  - BP elevated on arrival to 170/110s  - started on Nifedipine 30mg with improvement in BP to 100-110s/70-80s  - would continue with Nifedipine 30mg daily  - if SBP persistently >140, can increase dose to Nifedipine XL 60mg    #SMA syndrome  - pt can f/u with vascular  - should obtain all of her records/have them sent here if she wants to transfer care    Pt can follow up with Dr. Latonia Skaggs as an outpatient.    Recommendations above are preliminary pending discussion with an attending cardiologist  Plan was discussed with primary team  We'll continue to follow, thank you for the consultation      Vicky Ortega   Cardiovascular Disease Fellow  Consult Pager: [phone]

## 2023-12-16 NOTE — PROGRESS NOTE ADULT - SUBJECTIVE AND OBJECTIVE BOX
Patient evaluated at bedside this morning. Patient with severe itchiness to entire body. She reports pain is well controlled with oral pain medications.   She denies headache, dizziness, chest pain, palpitations, shortness of breath, nausea, vomiting or heavy vaginal bleeding. She also denies RUQ pain, blurry vision.  She has not tried ambulating since procedure, woodard remains in place at this time. Tolerating PO.  Physical Exam:  Vital Signs Last 24 Hrs  T(C): 36.4 (16 Dec 2023 06:00), Max: 36.8 (16 Dec 2023 00:05)  T(F): 97.6 (16 Dec 2023 06:00), Max: 98.2 (16 Dec 2023 00:05)  HR: 69 (16 Dec 2023 06:00) (68 - 102)  BP: 151/104 (16 Dec 2023 06:00) (103/80 - 155/90)  BP(mean): --  RR: 16 (16 Dec 2023 06:00) (12 - 18)  SpO2: 100% (16 Dec 2023 06:00) (98% - 100%)    Parameters below as of 16 Dec 2023 06:00  Patient On (Oxygen Delivery Method): room air        GA: NAD, A+0 x 3  Pulm: no increased WOB, luings CTAB  Abd: soft, nontender, nondistended, no rebound or guarding, incision clean, dry and intact, uterus firm at midline, 2 fb below umbilicus  : woodard in situ, lochia WNL  Extremities: no swelling or calf tenderness, SCDs in place, 2+ reflexes bilaterally                            9.4    13.08 )-----------( 247      ( 16 Dec 2023 05:55 )             26.8     12-16    130<L>  |  99  |  3<L>  ----------------------------<  101<H>  4.1   |  24  |  0.54    Ca    7.3<L>      16 Dec 2023 04:16  Mg     4.4     12-15    TPro  5.8<L>  /  Alb  3.0<L>  /  TBili  0.9  /  DBili  x   /  AST  26  /  ALT  9<L>  /  AlkPhos  124<H>  12-16    Magnesium: 4.4 mg/dL (12-15 @ 23:54)  Magnesium: 5.8 mg/dL (12-15 @ 19:27)      acetaminophen     Tablet .. 975 milliGRAM(s) Oral <User Schedule>  diphenhydrAMINE 25 milliGRAM(s) Oral every 6 hours PRN  diphtheria/tetanus/pertussis (acellular) Vaccine (Adacel) 0.5 milliLiter(s) IntraMuscular once  enoxaparin Injectable 40 milliGRAM(s) SubCutaneous every 24 hours  ibuprofen  Tablet. 600 milliGRAM(s) Oral every 6 hours  ketorolac   Injectable 30 milliGRAM(s) IV Push every 6 hours  lanolin Ointment 1 Application(s) Topical every 6 hours PRN  magnesium hydroxide Suspension 30 milliLiter(s) Oral two times a day PRN  magnesium sulfate Infusion 1 Gm/Hr IV Continuous <Continuous>  NIFEdipine XL 30 milliGRAM(s) Oral every 12 hours  oxyCODONE    IR 5 milliGRAM(s) Oral once PRN  oxyCODONE    IR 5 milliGRAM(s) Oral every 3 hours PRN  oxytocin Infusion 333.333 milliUNIT(s)/Min IV Continuous <Continuous>  simethicone 80 milliGRAM(s) Chew every 4 hours PRN  sodium chloride 0.9%. 1000 milliLiter(s) IV Continuous <Continuous>

## 2023-12-17 LAB
ANION GAP SERPL CALC-SCNC: 7 MMOL/L — SIGNIFICANT CHANGE UP (ref 5–17)
ANION GAP SERPL CALC-SCNC: 7 MMOL/L — SIGNIFICANT CHANGE UP (ref 5–17)
BASOPHILS # BLD AUTO: 0.04 K/UL — SIGNIFICANT CHANGE UP (ref 0–0.2)
BASOPHILS # BLD AUTO: 0.04 K/UL — SIGNIFICANT CHANGE UP (ref 0–0.2)
BASOPHILS NFR BLD AUTO: 0.3 % — SIGNIFICANT CHANGE UP (ref 0–2)
BASOPHILS NFR BLD AUTO: 0.3 % — SIGNIFICANT CHANGE UP (ref 0–2)
BUN SERPL-MCNC: 5 MG/DL — LOW (ref 7–23)
BUN SERPL-MCNC: 5 MG/DL — LOW (ref 7–23)
CALCIUM SERPL-MCNC: 8.7 MG/DL — SIGNIFICANT CHANGE UP (ref 8.4–10.5)
CALCIUM SERPL-MCNC: 8.7 MG/DL — SIGNIFICANT CHANGE UP (ref 8.4–10.5)
CHLORIDE SERPL-SCNC: 102 MMOL/L — SIGNIFICANT CHANGE UP (ref 96–108)
CHLORIDE SERPL-SCNC: 102 MMOL/L — SIGNIFICANT CHANGE UP (ref 96–108)
CO2 SERPL-SCNC: 26 MMOL/L — SIGNIFICANT CHANGE UP (ref 22–31)
CO2 SERPL-SCNC: 26 MMOL/L — SIGNIFICANT CHANGE UP (ref 22–31)
CREAT SERPL-MCNC: 0.63 MG/DL — SIGNIFICANT CHANGE UP (ref 0.5–1.3)
CREAT SERPL-MCNC: 0.63 MG/DL — SIGNIFICANT CHANGE UP (ref 0.5–1.3)
CULTURE RESULTS: NO GROWTH — SIGNIFICANT CHANGE UP
CULTURE RESULTS: NO GROWTH — SIGNIFICANT CHANGE UP
EGFR: 126 ML/MIN/1.73M2 — SIGNIFICANT CHANGE UP
EGFR: 126 ML/MIN/1.73M2 — SIGNIFICANT CHANGE UP
EOSINOPHIL # BLD AUTO: 0.04 K/UL — SIGNIFICANT CHANGE UP (ref 0–0.5)
EOSINOPHIL # BLD AUTO: 0.04 K/UL — SIGNIFICANT CHANGE UP (ref 0–0.5)
EOSINOPHIL NFR BLD AUTO: 0.3 % — SIGNIFICANT CHANGE UP (ref 0–6)
EOSINOPHIL NFR BLD AUTO: 0.3 % — SIGNIFICANT CHANGE UP (ref 0–6)
GLUCOSE SERPL-MCNC: 77 MG/DL — SIGNIFICANT CHANGE UP (ref 70–99)
GLUCOSE SERPL-MCNC: 77 MG/DL — SIGNIFICANT CHANGE UP (ref 70–99)
HCT VFR BLD CALC: 27.4 % — LOW (ref 34.5–45)
HCT VFR BLD CALC: 27.4 % — LOW (ref 34.5–45)
HGB BLD-MCNC: 9.5 G/DL — LOW (ref 11.5–15.5)
HGB BLD-MCNC: 9.5 G/DL — LOW (ref 11.5–15.5)
IMM GRANULOCYTES NFR BLD AUTO: 0.5 % — SIGNIFICANT CHANGE UP (ref 0–0.9)
IMM GRANULOCYTES NFR BLD AUTO: 0.5 % — SIGNIFICANT CHANGE UP (ref 0–0.9)
LYMPHOCYTES # BLD AUTO: 2.54 K/UL — SIGNIFICANT CHANGE UP (ref 1–3.3)
LYMPHOCYTES # BLD AUTO: 2.54 K/UL — SIGNIFICANT CHANGE UP (ref 1–3.3)
LYMPHOCYTES # BLD AUTO: 20.1 % — SIGNIFICANT CHANGE UP (ref 13–44)
LYMPHOCYTES # BLD AUTO: 20.1 % — SIGNIFICANT CHANGE UP (ref 13–44)
MCHC RBC-ENTMCNC: 31.4 PG — SIGNIFICANT CHANGE UP (ref 27–34)
MCHC RBC-ENTMCNC: 31.4 PG — SIGNIFICANT CHANGE UP (ref 27–34)
MCHC RBC-ENTMCNC: 34.7 GM/DL — SIGNIFICANT CHANGE UP (ref 32–36)
MCHC RBC-ENTMCNC: 34.7 GM/DL — SIGNIFICANT CHANGE UP (ref 32–36)
MCV RBC AUTO: 90.4 FL — SIGNIFICANT CHANGE UP (ref 80–100)
MCV RBC AUTO: 90.4 FL — SIGNIFICANT CHANGE UP (ref 80–100)
MONOCYTES # BLD AUTO: 1.01 K/UL — HIGH (ref 0–0.9)
MONOCYTES # BLD AUTO: 1.01 K/UL — HIGH (ref 0–0.9)
MONOCYTES NFR BLD AUTO: 8 % — SIGNIFICANT CHANGE UP (ref 2–14)
MONOCYTES NFR BLD AUTO: 8 % — SIGNIFICANT CHANGE UP (ref 2–14)
NEUTROPHILS # BLD AUTO: 8.96 K/UL — HIGH (ref 1.8–7.4)
NEUTROPHILS # BLD AUTO: 8.96 K/UL — HIGH (ref 1.8–7.4)
NEUTROPHILS NFR BLD AUTO: 70.8 % — SIGNIFICANT CHANGE UP (ref 43–77)
NEUTROPHILS NFR BLD AUTO: 70.8 % — SIGNIFICANT CHANGE UP (ref 43–77)
NRBC # BLD: 0 /100 WBCS — SIGNIFICANT CHANGE UP (ref 0–0)
NRBC # BLD: 0 /100 WBCS — SIGNIFICANT CHANGE UP (ref 0–0)
PLATELET # BLD AUTO: 250 K/UL — SIGNIFICANT CHANGE UP (ref 150–400)
PLATELET # BLD AUTO: 250 K/UL — SIGNIFICANT CHANGE UP (ref 150–400)
POTASSIUM SERPL-MCNC: 4.3 MMOL/L — SIGNIFICANT CHANGE UP (ref 3.5–5.3)
POTASSIUM SERPL-MCNC: 4.3 MMOL/L — SIGNIFICANT CHANGE UP (ref 3.5–5.3)
POTASSIUM SERPL-SCNC: 4.3 MMOL/L — SIGNIFICANT CHANGE UP (ref 3.5–5.3)
POTASSIUM SERPL-SCNC: 4.3 MMOL/L — SIGNIFICANT CHANGE UP (ref 3.5–5.3)
RBC # BLD: 3.03 M/UL — LOW (ref 3.8–5.2)
RBC # BLD: 3.03 M/UL — LOW (ref 3.8–5.2)
RBC # FLD: 13.1 % — SIGNIFICANT CHANGE UP (ref 10.3–14.5)
RBC # FLD: 13.1 % — SIGNIFICANT CHANGE UP (ref 10.3–14.5)
SODIUM SERPL-SCNC: 135 MMOL/L — SIGNIFICANT CHANGE UP (ref 135–145)
SODIUM SERPL-SCNC: 135 MMOL/L — SIGNIFICANT CHANGE UP (ref 135–145)
SPECIMEN SOURCE: SIGNIFICANT CHANGE UP
SPECIMEN SOURCE: SIGNIFICANT CHANGE UP
WBC # BLD: 12.65 K/UL — HIGH (ref 3.8–10.5)
WBC # BLD: 12.65 K/UL — HIGH (ref 3.8–10.5)
WBC # FLD AUTO: 12.65 K/UL — HIGH (ref 3.8–10.5)
WBC # FLD AUTO: 12.65 K/UL — HIGH (ref 3.8–10.5)

## 2023-12-17 PROCEDURE — 99233 SBSQ HOSP IP/OBS HIGH 50: CPT

## 2023-12-17 PROCEDURE — 93970 EXTREMITY STUDY: CPT | Mod: 26

## 2023-12-17 RX ORDER — METOCLOPRAMIDE HCL 10 MG
10 TABLET ORAL ONCE
Refills: 0 | Status: COMPLETED | OUTPATIENT
Start: 2023-12-17 | End: 2023-12-18

## 2023-12-17 RX ADMIN — ALBUTEROL 2 PUFF(S): 90 AEROSOL, METERED ORAL at 02:20

## 2023-12-17 RX ADMIN — SIMETHICONE 80 MILLIGRAM(S): 80 TABLET, CHEWABLE ORAL at 06:11

## 2023-12-17 RX ADMIN — Medication 30 MILLIGRAM(S): at 17:45

## 2023-12-17 RX ADMIN — Medication 1 APPLICATION(S): at 15:21

## 2023-12-17 RX ADMIN — Medication 975 MILLIGRAM(S): at 20:38

## 2023-12-17 RX ADMIN — SIMETHICONE 80 MILLIGRAM(S): 80 TABLET, CHEWABLE ORAL at 19:20

## 2023-12-17 RX ADMIN — Medication 975 MILLIGRAM(S): at 08:05

## 2023-12-17 RX ADMIN — Medication 975 MILLIGRAM(S): at 02:18

## 2023-12-17 RX ADMIN — OXYCODONE HYDROCHLORIDE 5 MILLIGRAM(S): 5 TABLET ORAL at 03:18

## 2023-12-17 RX ADMIN — OXYCODONE HYDROCHLORIDE 5 MILLIGRAM(S): 5 TABLET ORAL at 08:05

## 2023-12-17 RX ADMIN — Medication 30 MILLIGRAM(S): at 06:04

## 2023-12-17 RX ADMIN — Medication 975 MILLIGRAM(S): at 08:24

## 2023-12-17 RX ADMIN — Medication 600 MILLIGRAM(S): at 11:00

## 2023-12-17 RX ADMIN — Medication 600 MILLIGRAM(S): at 17:45

## 2023-12-17 RX ADMIN — POLYETHYLENE GLYCOL 3350 17 GRAM(S): 17 POWDER, FOR SOLUTION ORAL at 06:14

## 2023-12-17 RX ADMIN — Medication 600 MILLIGRAM(S): at 06:04

## 2023-12-17 RX ADMIN — Medication 600 MILLIGRAM(S): at 07:27

## 2023-12-17 RX ADMIN — Medication 975 MILLIGRAM(S): at 03:12

## 2023-12-17 RX ADMIN — ENOXAPARIN SODIUM 40 MILLIGRAM(S): 100 INJECTION SUBCUTANEOUS at 07:37

## 2023-12-17 RX ADMIN — MOMETASONE FUROATE 2 PUFF(S): 220 INHALANT RESPIRATORY (INHALATION) at 07:38

## 2023-12-17 RX ADMIN — OXYCODONE HYDROCHLORIDE 5 MILLIGRAM(S): 5 TABLET ORAL at 18:33

## 2023-12-17 RX ADMIN — OXYCODONE HYDROCHLORIDE 5 MILLIGRAM(S): 5 TABLET ORAL at 02:18

## 2023-12-17 RX ADMIN — Medication 975 MILLIGRAM(S): at 15:21

## 2023-12-17 NOTE — PROGRESS NOTE ADULT - SUBJECTIVE AND OBJECTIVE BOX
Patient is a 23y old  Female who presents with a chief complaint of pregnancy (16 Dec 2023 07:04)    INTERVAL EVENTS: NAEON    SUBJECTIVE:  Patient was seen and examined at bedside. Pt reports not be able to sleep for > 72hr, still has incision pain over  site. No BM yet. Unable to produce any breast milk. denies SOB.     Review of systems: No fever, chills, dizziness, HA, Changes in vision, CP, dyspnea, nausea or vomiting, dysuria, changes in bowel movements, LE edema. Rest of 12 point Review of systems negative unless otherwise documented elsewhere in note.     Diet, Regular (12-15-23 @ 15:23) [Active]      MEDICATIONS:  MEDICATIONS  (STANDING):  acetaminophen     Tablet .. 975 milliGRAM(s) Oral <User Schedule>  diphtheria/tetanus/pertussis (acellular) Vaccine (Adacel) 0.5 milliLiter(s) IntraMuscular once  enoxaparin Injectable 40 milliGRAM(s) SubCutaneous every 24 hours  ibuprofen  Tablet. 600 milliGRAM(s) Oral every 6 hours  mometasone 220 MICROgram(s) Inhaler 2 Puff(s) Inhalation daily  NIFEdipine XL 30 milliGRAM(s) Oral every 12 hours  oxytocin Infusion 333.333 milliUNIT(s)/Min (1000 mL/Hr) IV Continuous <Continuous>  polyethylene glycol 3350 17 Gram(s) Oral daily    MEDICATIONS  (PRN):  albuterol    90 MICROgram(s) HFA Inhaler 2 Puff(s) Inhalation every 6 hours PRN Shortness of Breath and/or Wheezing  diphenhydrAMINE 25 milliGRAM(s) Oral every 6 hours PRN Pruritus  lanolin Ointment 1 Application(s) Topical every 6 hours PRN Sore Nipples  magnesium hydroxide Suspension 30 milliLiter(s) Oral two times a day PRN Constipation  oxyCODONE    IR 5 milliGRAM(s) Oral once PRN Moderate to Severe Pain (4-10)  oxyCODONE    IR 5 milliGRAM(s) Oral every 3 hours PRN Moderate to Severe Pain (4-10)  simethicone 80 milliGRAM(s) Chew every 4 hours PRN Gas      Allergies    shellfish (Anaphylaxis)  No Known Drug Allergies  latex (Rash)    Intolerances        OBJECTIVE:  Vital Signs Last 24 Hrs  T(C): 36.6 (17 Dec 2023 10:00), Max: 36.7 (16 Dec 2023 18:00)  T(F): 97.8 (17 Dec 2023 10:00), Max: 98.1 (16 Dec 2023 18:00)  HR: 64 (17 Dec 2023 10:00) (64 - 81)  BP: 129/89 (17 Dec 2023 10:00) (129/89 - 148/92)  BP(mean): --  RR: 18 (17 Dec 2023 10:00) (18 - 18)  SpO2: 100% (17 Dec 2023 10:00) (100% - 100%)    Parameters below as of 17 Dec 2023 10:00  Patient On (Oxygen Delivery Method): room air      I&O's Summary    16 Dec 2023 07:01  -  17 Dec 2023 07:00  --------------------------------------------------------  IN: 525 mL / OUT: 2200 mL / NET: -1675 mL        PHYSICAL EXAM:  Gen: Reclining in bed at time of exam, appears stated age  HEENT: NCAT, MMM, clear OP  Neck: supple, trachea at midline  CV: RRR, +S1/S2  Pulm: adequate respiratory effort, no increase in work of breathing  Abd: soft, NTND  Skin: warm and dry,   Ext: WWP, no LE edema  Neuro: AOx3, no gross focal neurological deficits  Psych: affect and behavior appropriate, pleasant at time of interview  :     LABS:                        9.5    12.65 )-----------( 250      ( 17 Dec 2023 05:30 )             27.4         135  |  102  |  5<L>  ----------------------------<  77  4.3   |  26  |  0.63    Ca    8.7      17 Dec 2023 05:30  Mg     4.4     1216    TPro  5.8<L>  /  Alb  2.9<L>  /  TBili  0.9  /  DBili  x   /  AST  30  /  ALT  10  /  AlkPhos  122<H>  12-16    LIVER FUNCTIONS - ( 16 Dec 2023 05:55 )  Alb: 2.9 g/dL / Pro: 5.8 g/dL / ALK PHOS: 122 U/L / ALT: 10 U/L / AST: 30 U/L / GGT: x             CAPILLARY BLOOD GLUCOSE        Urinalysis Basic - ( 17 Dec 2023 05:30 )    Color: x / Appearance: x / SG: x / pH: x  Gluc: 77 mg/dL / Ketone: x  / Bili: x / Urobili: x   Blood: x / Protein: x / Nitrite: x   Leuk Esterase: x / RBC: x / WBC x   Sq Epi: x / Non Sq Epi: x / Bacteria: x        MICRODATA:    Culture - Urine (collected 15 Dec 2023 19:51)  Source: Catheterized Catheterized  Final Report (17 Dec 2023 08:54):    No growth    Culture - Blood (collected 15 Dec 2023 16:34)  Source: .Blood Blood-Venous  Preliminary Report (16 Dec 2023 18:00):    No growth at 1 day.        RADIOLOGY/OTHER STUDIES:    PCP  Pharmacy:   Emergency contact:

## 2023-12-17 NOTE — PROGRESS NOTE ADULT - ASSESSMENT
23F  with PMHx of Bipolar disorder, manic depression, ADHD, SMA syndrome, previous hx of cardiac arrest s/p delivery 2016, asthma (Flovent and PRN albuterol) at 34w0d, was diagnosed with gestational HTN at [facility] who presented for  contracts. Pt is now s/p  12/15. Course complicated by PEC with SF, requiring IV magnesium. Post op, pt was noted to be hypothermic to 93*F, as well as in new onset Afib. Cardiology consulted for management of Afib which converted to NSR spontaneously. Pt met SIRS criteria with lactic acidosis 2.3- .1.3 after hydration, and given iv abx with Ampicillin, Cefazolin and Azithromycin with pruritis ( no rash) and given Benadryl 50mg IM. Medicine consulted  () for management of post-op hypothermia/sepsis and hyponatremia.     # SIRS  # Leukocytosis   # lactic acidosis   - no clinical evidence of occult infection, UA, CXR, negative   - would observe off antibiotic  - HIV negative ( 23)  - no protein gap   - trend WBC 16K->13K-> 12.65K, likely reactive leukocytosis   - lactate normalized 2.3-> 1.3    # hypothermia ( resolved)   - likely related to spinal anesthesia fro    - supportive care     # Hyponatremia ( resolved)   - urine lytes no evidence of SIADH  - encourage oral hydration   - trend Na 127-> 130 - >135    # New onset AFib  # gHTN  # SMA syndrome   - Cardiology consult appreciated   - TTE reviewed   - SBP>140's persistently, consider increase Nifedipime XL 30mg to 60mg po daily  - collaterals for the SMA syndrome ( pt follows with Vascular surgery at [facility])     # Constipation  - laxative   - may increase miralax 17gm po bid and add senna 2 tabs po qHS     POC as d/w nursing staff 23F  with PMHx of Bipolar disorder, manic depression, ADHD, SMA syndrome, previous hx of cardiac arrest s/p delivery 2016, asthma (Flovent and PRN albuterol) at 34w0d, was diagnosed with gestational HTN at [facility] [place] who presented for  contracts. Pt is now s/p  12/15. Course complicated by PEC with SF, requiring IV magnesium. Post op, pt was noted to be hypothermic to 93*F, as well as in new onset Afib. Cardiology consulted for management of Afib which converted to NSR spontaneously. Pt met SIRS criteria with lactic acidosis 2.3- .1.3 after hydration, and given iv abx with Ampicillin, Cefazolin and Azithromycin with pruritis ( no rash) and given Benadryl 50mg IM. Medicine consulted  () for management of post-op hypothermia/sepsis and hyponatremia.     # SIRS  # Leukocytosis   # lactic acidosis   - no clinical evidence of occult infection, UA, CXR, negative   - would observe off antibiotic  - HIV negative ( 23)  - no protein gap   - trend WBC 16K->13K-> 12.65K, likely reactive leukocytosis   - lactate normalized 2.3-> 1.3    # hypothermia ( resolved)   - likely related to spinal anesthesia fro    - supportive care     # Hyponatremia ( resolved)   - urine lytes no evidence of SIADH  - encourage oral hydration   - trend Na 127-> 130 - >135    # New onset AFib  # gHTN  # SMA syndrome   - Cardiology consult appreciated   - TTE reviewed   - SBP>140's persistently, consider increase Nifedipime XL 30mg to 60mg po daily  - collaterals for the SMA syndrome ( pt follows with Vascular surgery at [facility])     # Constipation  - laxative   - may increase miralax 17gm po bid and add senna 2 tabs po qHS     POC as d/w nursing staff

## 2023-12-17 NOTE — PROGRESS NOTE ADULT - SUBJECTIVE AND OBJECTIVE BOX
Patient evaluated at bedside this morning, resting comfortable in bed.   She reports pain is well controlled.  She denies headache, dizziness, chest pain, palpitations, shortness of breathe, nausea, vomiting or heavy vaginal bleeding.  She has been ambulating without assistance, voiding spontaneously, passing gas, tolerating regular diet and is breastfeeding.    Physical Exam:  Vital Signs Last 24 Hrs  T(C): 36.6 (17 Dec 2023 06:13), Max: 37 (16 Dec 2023 10:00)  T(F): 97.9 (17 Dec 2023 06:13), Max: 98.6 (16 Dec 2023 10:00)  HR: 81 (17 Dec 2023 06:13) (64 - 81)  BP: 141/94 (17 Dec 2023 06:13) (125/84 - 149/92)  BP(mean): --  RR: 18 (17 Dec 2023 06:13) (18 - 18)  SpO2: 100% (17 Dec 2023 06:13) (100% - 100%)    Parameters below as of 17 Dec 2023 06:13  Patient On (Oxygen Delivery Method): room air        GA: NAD, A+0 x 3  Abd: soft, nontender, nondistended, no rebound or guarding, incision clean, dry and intact, uterus firm at midline and below umbilicus  : lochia WNL  Extremities: no swelling or calf tenderness                             9.4    13.08 )-----------( 247      ( 16 Dec 2023 05:55 )             26.8     12-16    130<L>  |  100  |  2<L>  ----------------------------<  91  4.0   |  20<L>  |  0.60    Ca    7.3<L>      16 Dec 2023 05:55  Mg     4.4     12-16    TPro  5.8<L>  /  Alb  2.9<L>  /  TBili  0.9  /  DBili  x   /  AST  30  /  ALT  10  /  AlkPhos  122<H>  12-16

## 2023-12-17 NOTE — PROGRESS NOTE ADULT - ASSESSMENT
A/P: 23y s/p  section, POD#2, stable  - PEC w/ SF: pt has mild range BPs, no toxic complaints, on Nifedipine 30 BID  - Hypothermia- r/o sepsis: pt has normal temp at this time with normal CXR, Urine Cx; Lactate downtrended from 2.3 to 1.3, BCx pending (NGx1)  - Arrythmia: EKG 12/15 : c/f afib with RVR; TTE  shows patent foramen ovale and small secundum atrial septal defect with EF 67% with mild pulmonary htn; Cardiology following - plan to appreciate recs ( afib in labor now resolved, likely paroxysmal , no anticoag at this time, replete lytes ,get nonurgent echo. Per cards, nothing to do for PFO and ASD. Patient to f/u with Dr. Skaggs as o/p)  - Hyponatremia: started with Na 127 now on NS as of 12/15 and uptrended to 130  -  Pain: PO motrin q6hrs, tylenol q8hrs, oxycodone for severe pain PRN  -  Post-operatively labs: post-op Hgb , hemodynamically stable, no symptoms of anemia   -  GI: tolerating regular diet, passing gas  -  : s/p woodard , urinating without difficulty  -  DVT prophylaxis: encouraged increased ambulation, SCDs, Lovenox  -  Dispo: POD 3 or 4

## 2023-12-18 PROCEDURE — 99233 SBSQ HOSP IP/OBS HIGH 50: CPT | Mod: GC

## 2023-12-18 PROCEDURE — 99233 SBSQ HOSP IP/OBS HIGH 50: CPT

## 2023-12-18 RX ORDER — OXYCODONE HYDROCHLORIDE 5 MG/1
5 TABLET ORAL ONCE
Refills: 0 | Status: DISCONTINUED | OUTPATIENT
Start: 2023-12-18 | End: 2023-12-18

## 2023-12-18 RX ORDER — TETANUS TOXOID, REDUCED DIPHTHERIA TOXOID AND ACELLULAR PERTUSSIS VACCINE, ADSORBED 5; 2.5; 8; 8; 2.5 [IU]/.5ML; [IU]/.5ML; UG/.5ML; UG/.5ML; UG/.5ML
0.5 SUSPENSION INTRAMUSCULAR ONCE
Refills: 0 | Status: COMPLETED | OUTPATIENT
Start: 2023-12-18 | End: 2023-12-18

## 2023-12-18 RX ORDER — LABETALOL HCL 100 MG
200 TABLET ORAL EVERY 12 HOURS
Refills: 0 | Status: DISCONTINUED | OUTPATIENT
Start: 2023-12-18 | End: 2023-12-19

## 2023-12-18 RX ORDER — INFLUENZA VIRUS VACCINE 15; 15; 15; 15 UG/.5ML; UG/.5ML; UG/.5ML; UG/.5ML
0.5 SUSPENSION INTRAMUSCULAR ONCE
Refills: 0 | Status: COMPLETED | OUTPATIENT
Start: 2023-12-18 | End: 2023-12-18

## 2023-12-18 RX ORDER — NIFEDIPINE 30 MG
60 TABLET, EXTENDED RELEASE 24 HR ORAL EVERY 24 HOURS
Refills: 0 | Status: DISCONTINUED | OUTPATIENT
Start: 2023-12-18 | End: 2023-12-18

## 2023-12-18 RX ADMIN — Medication 30 MILLIGRAM(S): at 06:07

## 2023-12-18 RX ADMIN — Medication 10 MILLIGRAM(S): at 00:35

## 2023-12-18 RX ADMIN — OXYCODONE HYDROCHLORIDE 5 MILLIGRAM(S): 5 TABLET ORAL at 18:15

## 2023-12-18 RX ADMIN — OXYCODONE HYDROCHLORIDE 5 MILLIGRAM(S): 5 TABLET ORAL at 13:57

## 2023-12-18 RX ADMIN — Medication 600 MILLIGRAM(S): at 13:15

## 2023-12-18 RX ADMIN — Medication 975 MILLIGRAM(S): at 09:14

## 2023-12-18 RX ADMIN — Medication 600 MILLIGRAM(S): at 18:15

## 2023-12-18 RX ADMIN — OXYCODONE HYDROCHLORIDE 5 MILLIGRAM(S): 5 TABLET ORAL at 22:00

## 2023-12-18 RX ADMIN — Medication 975 MILLIGRAM(S): at 06:23

## 2023-12-18 RX ADMIN — TETANUS TOXOID, REDUCED DIPHTHERIA TOXOID AND ACELLULAR PERTUSSIS VACCINE, ADSORBED 0.5 MILLILITER(S): 5; 2.5; 8; 8; 2.5 SUSPENSION INTRAMUSCULAR at 18:16

## 2023-12-18 RX ADMIN — Medication 975 MILLIGRAM(S): at 14:53

## 2023-12-18 RX ADMIN — Medication 5 MILLIGRAM(S): at 09:17

## 2023-12-18 RX ADMIN — ENOXAPARIN SODIUM 40 MILLIGRAM(S): 100 INJECTION SUBCUTANEOUS at 08:00

## 2023-12-18 RX ADMIN — OXYCODONE HYDROCHLORIDE 5 MILLIGRAM(S): 5 TABLET ORAL at 21:23

## 2023-12-18 RX ADMIN — OXYCODONE HYDROCHLORIDE 5 MILLIGRAM(S): 5 TABLET ORAL at 14:20

## 2023-12-18 RX ADMIN — Medication 600 MILLIGRAM(S): at 00:06

## 2023-12-18 RX ADMIN — POLYETHYLENE GLYCOL 3350 17 GRAM(S): 17 POWDER, FOR SOLUTION ORAL at 13:17

## 2023-12-18 RX ADMIN — Medication 200 MILLIGRAM(S): at 18:15

## 2023-12-18 RX ADMIN — SIMETHICONE 80 MILLIGRAM(S): 80 TABLET, CHEWABLE ORAL at 14:08

## 2023-12-18 RX ADMIN — Medication 975 MILLIGRAM(S): at 04:52

## 2023-12-18 NOTE — PROGRESS NOTE ADULT - ASSESSMENT
23F with SMA syndrome, previous hx of cardiac arrest s/p delivery 2016, asthma (Flovent and PRN albuterol) who presented for  contracts. Pt is now s/p Csection 12/15. Course complicated by PEC with SF, requiring IV magnesium. Post op, pt was noted to be hypothermic to 93F, as well as in Afib. Cardiology consulted for management of Afib and Medicine consulted for management of post-op hypothermia and hyponatremia.     #hypothermia  #SIRS - RESOLVED  Post procedure, pt hypothermic to 93.8, with HR of . Also with leukocytosis of 15  Pt meets SIRS criteria, no known source. Pt denies fevers, chills, shortness of breath, cough, abdominal pain, n/v/d  suspect hypothermia to be secondary to post-anesthesia from c section, as patient's temperature has now resolved without intervention  tachycardia likely in setting of pain and leukocytosis likely reactive, as also now improving post procedure. lactate cleared after IVF  pt is s/p Ancef, ampicillin and Azithro post procedure. CXR with no acute infiltrate. UA negative. No signs of cellulitis on exam  recommend monitoring off antibiotics at this time  f/u BCx    #hypovolemic hypoNa - RESOLVED  Pt initially presented with sodium of 132, then dropped to Na of 127, now improved to 135  encourage PO intake, will continue to trend BMP    #New onset AFib  #gestational HTN  #SMA syndrome   Cardiology consullted- reccs appreciated  TTE: normal LV function, mild MR, Color flow doppler reveals evidence of a patent foramen ovale Vs a small secundum atrial septal defect  per recommendations, c/w Nifedipine XL 30mg PO qD, may titrate to 60mg daily if SBP>140's persistently   no need for AC at this time    Case discussed and seen with Dr. Matias. Med consult will continue to follow. No medicine contraindications to discharge

## 2023-12-18 NOTE — PROGRESS NOTE ADULT - SUBJECTIVE AND OBJECTIVE BOX
INTERVAL EVENTS:    PAST MEDICAL & SURGICAL HISTORY:  ADHD    Asthma    Bipolar disorder    Manic depression    SMAS (superior mesenteric artery syndrome)    No significant past surgical history    S/P D&C (status post dilation and curettage)        MEDICATIONS  (STANDING):  acetaminophen     Tablet .. 975 milliGRAM(s) Oral <User Schedule>  bisacodyl 5 milliGRAM(s) Oral at bedtime  diphtheria/tetanus/pertussis (acellular) Vaccine (Adacel) 0.5 milliLiter(s) IntraMuscular once  enoxaparin Injectable 40 milliGRAM(s) SubCutaneous every 24 hours  ibuprofen  Tablet. 600 milliGRAM(s) Oral every 6 hours  mometasone 220 MICROgram(s) Inhaler 2 Puff(s) Inhalation daily  NIFEdipine XL 60 milliGRAM(s) Oral every 24 hours  oxytocin Infusion 333.333 milliUNIT(s)/Min (1000 mL/Hr) IV Continuous <Continuous>  polyethylene glycol 3350 17 Gram(s) Oral daily    MEDICATIONS  (PRN):  albuterol    90 MICROgram(s) HFA Inhaler 2 Puff(s) Inhalation every 6 hours PRN Shortness of Breath and/or Wheezing  diphenhydrAMINE 25 milliGRAM(s) Oral every 6 hours PRN Pruritus  lanolin Ointment 1 Application(s) Topical every 6 hours PRN Sore Nipples  magnesium hydroxide Suspension 30 milliLiter(s) Oral two times a day PRN Constipation  oxyCODONE    IR 5 milliGRAM(s) Oral every 3 hours PRN Moderate to Severe Pain (4-10)  oxyCODONE    IR 5 milliGRAM(s) Oral once PRN Moderate to Severe Pain (4-10)  simethicone 80 milliGRAM(s) Chew every 4 hours PRN Gas    T(F): 97.9 (12-18-23 @ 14:00), Max: 98.2 (12-18-23 @ 02:04)  HR: 78 (12-18-23 @ 14:00) (73 - 89)  BP: 147/93 (12-18-23 @ 14:00) (116/76 - 147/93)  BP(mean): 111 (12-18-23 @ 14:00) (111 - 111)  ABP: --  ABP(mean): --  RR: 18 (12-18-23 @ 14:00) (18 - 18)  SpO2: 100% (12-18-23 @ 14:00) (99% - 100%)    I/O Detail 24H      PHYSICAL EXAM:  GEN: NAD  HEENT: EOMI   RESP: CTA b/l  CV: RRR. Normal S1/S2. No m/r/g.  ABD: soft, non-distended  EXT: No edema   NEURO: alert and attentive    LABS:  CBC 12-17-23 @ 05:30                        9.5    12.65 )-----------( 250                   27.4       Hgb trend: 9.5 <-- , 9.4 <-- , 9.6 <-- , 11.6 <--   WBC trend: 12.65 <-- , 13.08 <-- , 13.97 <-- , 15.99 <--       CMP 12-17-23 @ 05:30    135  |  102  |  5<L>  ----------------------------<  77  4.3   |  26  |  0.63          Serum Cr trend: 0.63 <-- , 0.60 <-- , 0.54 <-- , 0.55 <--         Cardiac Markers           STUDIES:

## 2023-12-18 NOTE — PROGRESS NOTE ADULT - ASSESSMENT
A/P: 23y s/p  section, POD#3, stable  - PEC w/ SF: pt has mild range BPs, no toxic complaints, on Nifedipine 30 BID  - Hypothermia- r/o sepsis: pt has normal temp at this time with normal CXR, Urine Cx; Lactate downtrended from 2.3 to 1.3, BCx pending (NGx2)  - Arrythmia: EKG 12/15 : c/f afib with RVR; TTE  shows patent foramen ovale and small secundum atrial septal defect with EF 67% with mild pulmonary htn; Cardiology following - plan to appreciate recs ( afib in labor now resolved, likely paroxysmal , no anticoag at this time, replete lytes ,get nonurgent echo. Per cards, nothing to do for PFO and ASD. Patient to f/u with Dr. Skaggs as o/p)  - Hyponatremia: started with Na 127 now on NS as of 12/15 and uptrended to 130  -  Pain: PO motrin q6hrs, tylenol q8hrs, oxycodone for severe pain PRN  -  Post-operatively labs: post-op Hgb , hemodynamically stable, no symptoms of anemia   -  GI: tolerating regular diet, passing gas  -  : s/p woodard , urinating without difficulty  -  DVT prophylaxis: encouraged increased ambulation, SCDs, Lovenox  -  Dispo: POD 3 or 4

## 2023-12-18 NOTE — PROGRESS NOTE ADULT - ATTENDING COMMENTS
23F  with PMHx of Bipolar disorder, manic depression, ADHD, SMA syndrome, previous hx of cardiac arrest s/p delivery 2016, asthma (Flovent and PRN albuterol) at 34w0d, was diagnosed with gestational HTN at [facility] who presented for  contracts. Pt is now s/p  12/15. Course complicated by PEC with SF, requiring IV magnesium. Post op, pt was noted to be hypothermic to 93*F, as well as in new onset Afib. Cardiology consulted for management of Afib which converted to NSR spontaneously. Pt met SIRS criteria with lactic acidosis 2.3- .1.3 after hydration, and given iv abx with Ampicillin, Cefazolin and Azithromycin with pruritis ( no rash) and given Benadryl 50mg IM. Medicine consulted  () for management of post-op hypothermia/sepsis and hyponatremia.     Pt seen and examined with Dr. Busch. Pt appears restful, denies pain.   # SIRS  # Leukocytosis   # lactic acidosis   - no clinical evidence of occult infection, UA, CXR, negative   - would observe off antibiotic  - HIV negative ( 23)  - no protein gap   - trend WBC 16K->13K-> 12.65K, likely reactive leukocytosis   - lactate normalized 2.3-> 1.3    # hypothermia ( resolved)   - likely related to spinal anesthesia fro    - supportive care     # Hyponatremia ( resolved)   - urine lytes no evidence of SIADH  - encourage oral hydration   - trend Na 127-> 130 - >135    # New onset AFib  # gHTN  # SMA syndrome   - Cardiology f/u appreciated   - TTE reviewed   - SBP>140's persistently, increased Nifedipine XL 30mg bid ( )  - Cards rec: switching Nifedipine XL to Labetalol 200mg po bid and EP to evaluate for implantable loop recorder ( ILR)   - collaterals for the SMA syndrome ( pt follows with Vascular surgery at [facility] [place])     # Constipation  - laxative on Miralax 17gm daily     Med consult resident Dr. Busch to d/w OB team 23F  with PMHx of Bipolar disorder, manic depression, ADHD, SMA syndrome, previous hx of cardiac arrest s/p delivery 2016, asthma (Flovent and PRN albuterol) at 34w0d, was diagnosed with gestational HTN at [facility] who presented for  contracts. Pt is now s/p  12/15. Course complicated by PEC with SF, requiring IV magnesium. Post op, pt was noted to be hypothermic to 93*F, as well as in new onset Afib. Cardiology consulted for management of Afib which converted to NSR spontaneously. Pt met SIRS criteria with lactic acidosis 2.3- .1.3 after hydration, and given iv abx with Ampicillin, Cefazolin and Azithromycin with pruritis ( no rash) and given Benadryl 50mg IM. Medicine consulted  () for management of post-op hypothermia/sepsis and hyponatremia.     Pt seen and examined with Dr. Busch. Pt appears restful, denies pain.   # SIRS  # Leukocytosis   # lactic acidosis   - no clinical evidence of occult infection, UA, CXR, negative   - would observe off antibiotic  - HIV negative ( 23)  - no protein gap   - trend WBC 16K->13K-> 12.65K, likely reactive leukocytosis   - lactate normalized 2.3-> 1.3    # hypothermia ( resolved)   - likely related to spinal anesthesia fro    - supportive care     # Hyponatremia ( resolved)   - urine lytes no evidence of SIADH  - encourage oral hydration   - trend Na 127-> 130 - >135    # New onset AFib  # gHTN  # SMA syndrome   - Cardiology f/u appreciated   - TTE reviewed   - SBP>140's persistently, increased Nifedipine XL 30mg bid ( )  - Cards rec: switching Nifedipine XL to Labetalol 200mg po bid and EP to evaluate for implantable loop recorder ( ILR)   - collaterals for the SMA syndrome ( pt follows with Vascular surgery at [facility])     # Constipation  - laxative on Miralax 17gm daily     Med consult resident Dr. Busch to d/w OB team

## 2023-12-18 NOTE — PROGRESS NOTE ADULT - SUBJECTIVE AND OBJECTIVE BOX
OVERNIGHT EVENTS: MARC    SUBJECTIVE:  Patient seen and examined at bedside. Pt reports feeling better, was able to shower this morning, notices improvement in surgical scar. No chest pain, shortness of breath, abdominal pain; able to tolerate PO intake.    Vital Signs Last 12 Hrs  T(F): 98.2 (12-18-23 @ 09:30), Max: 98.2 (12-18-23 @ 02:04)  HR: 85 (12-18-23 @ 09:30) (73 - 89)  BP: 146/93 (12-18-23 @ 09:30) (116/76 - 146/93)  BP(mean): 111 (12-18-23 @ 09:30) (111 - 111)  RR: 18 (12-18-23 @ 09:30) (18 - 18)  SpO2: 100% (12-18-23 @ 09:30) (99% - 100%)  I&O's Summary      PHYSICAL EXAM:  GENERAL: NAD, well-groomed, well-developed  HEAD:  Atraumatic, Normocephalic  EYES: EOMI, PERRLA, conjunctiva and sclera clear  ENMT: Moist mucous membranes  NECK: Supple  NERVOUS SYSTEM:  Alert & Oriented X3, Good concentration  CHEST/LUNG: Clear to percussion bilaterally; No rales, rhonchi, wheezing, or rubs  HEART: Regular rate and rhythm; No murmurs, rubs, or gallops  ABDOMEN: Soft, Nontender, Nondistended; Bowel sounds present  EXTREMITIES:  2+ Peripheral Pulses, No edema        LABS:                        9.5    12.65 )-----------( 250      ( 17 Dec 2023 05:30 )             27.4     12-17    135  |  102  |  5<L>  ----------------------------<  77  4.3   |  26  |  0.63    Ca    8.7      17 Dec 2023 05:30        Urinalysis Basic - ( 17 Dec 2023 05:30 )    Color: x / Appearance: x / SG: x / pH: x  Gluc: 77 mg/dL / Ketone: x  / Bili: x / Urobili: x   Blood: x / Protein: x / Nitrite: x   Leuk Esterase: x / RBC: x / WBC x   Sq Epi: x / Non Sq Epi: x / Bacteria: x          RADIOLOGY & ADDITIONAL TESTS:    MEDICATIONS  (STANDING):  acetaminophen     Tablet .. 975 milliGRAM(s) Oral <User Schedule>  bisacodyl 5 milliGRAM(s) Oral at bedtime  diphtheria/tetanus/pertussis (acellular) Vaccine (Adacel) 0.5 milliLiter(s) IntraMuscular once  enoxaparin Injectable 40 milliGRAM(s) SubCutaneous every 24 hours  ibuprofen  Tablet. 600 milliGRAM(s) Oral every 6 hours  mometasone 220 MICROgram(s) Inhaler 2 Puff(s) Inhalation daily  NIFEdipine XL 30 milliGRAM(s) Oral every 12 hours  oxytocin Infusion 333.333 milliUNIT(s)/Min (1000 mL/Hr) IV Continuous <Continuous>  polyethylene glycol 3350 17 Gram(s) Oral daily    MEDICATIONS  (PRN):  albuterol    90 MICROgram(s) HFA Inhaler 2 Puff(s) Inhalation every 6 hours PRN Shortness of Breath and/or Wheezing  diphenhydrAMINE 25 milliGRAM(s) Oral every 6 hours PRN Pruritus  lanolin Ointment 1 Application(s) Topical every 6 hours PRN Sore Nipples  magnesium hydroxide Suspension 30 milliLiter(s) Oral two times a day PRN Constipation  oxyCODONE    IR 5 milliGRAM(s) Oral every 3 hours PRN Moderate to Severe Pain (4-10)  oxyCODONE    IR 5 milliGRAM(s) Oral once PRN Moderate to Severe Pain (4-10)  simethicone 80 milliGRAM(s) Chew every 4 hours PRN Gas

## 2023-12-18 NOTE — PROGRESS NOTE ADULT - SUBJECTIVE AND OBJECTIVE BOX
Patient evaluated at bedside this morning, resting comfortable in bed.   She reports pain is well controlled.  She denies headache, dizziness, chest pain, palpitations, shortness of breathe, nausea, vomiting or heavy vaginal bleeding.  She has been ambulating without assistance, voiding spontaneously, passing gas, tolerating regular diet and is breastfeeding.    Physical Exam:  Vital Signs Last 24 Hrs  T(C): 36.7 (18 Dec 2023 06:21), Max: 37.1 (17 Dec 2023 14:00)  T(F): 98.1 (18 Dec 2023 06:21), Max: 98.8 (17 Dec 2023 14:00)  HR: 89 (18 Dec 2023 06:21) (64 - 89)  BP: 143/81 (18 Dec 2023 06:21) (116/76 - 146/94)  BP(mean): --  RR: 18 (18 Dec 2023 06:21) (18 - 18)  SpO2: 99% (18 Dec 2023 06:21) (99% - 100%)    Parameters below as of 18 Dec 2023 06:21  Patient On (Oxygen Delivery Method): room air        GA: NAD, A+0 x 3  Abd: soft, nontender, nondistended, no rebound or guarding, incision clean, dry and intact, uterus firm at midline and below umbilicus  : lochia WNL  Extremities: no swelling or calf tenderness                             9.5    12.65 )-----------( 250      ( 17 Dec 2023 05:30 )             27.4     12-17    135  |  102  |  5<L>  ----------------------------<  77  4.3   |  26  |  0.63    Ca    8.7      17 Dec 2023 05:30

## 2023-12-18 NOTE — PROGRESS NOTE ADULT - ASSESSMENT
22 yo F with PMH asthma, superior mesenteric artery syndrome, cardiac arrest after previous delivery, who presented with  contractions at 34 weeks, found to have gHTN and PEC w/ SF, now s/p . Noted to be in AFib during labor so cardiology consulted.    Review of Studies:  EKG 12/15 9AM: Afib RVR, rate 101  EKG  12/15 10pm: NSR, rate 81, no ischemic changes  TTE 23: Normal biventricular size/function. Mild LVH. Mildly dilated LA. PFO vs small ASD. Mild MR. Mild TR. PASP 45. Trivial pericardial effusion.     #New onset atrial fibrillation  - EKG noted with Afib during labor/prior to OR and now returned to NSR - likely paroxysmal  - CHADSVASC 1 and Afib duration < 24h. Would not start AC at this time.   - Prior to discharge, consult EP for placement of loop recorder  - replete electrolytes for goal K>4, Mg>2    #gHTN  - BP elevated on arrival to 170/110s  - Switch from nifedipine to labetolol 200mg BID    #SMA syndrome  - pt can f/u with vascular    Pt can follow up with Dr. Latonia Skaggs as an outpatient.           24 yo F with PMH asthma, superior mesenteric artery syndrome, cardiac arrest after previous delivery, who presented with  contractions at 34 weeks, found to have gHTN and PEC w/ SF, now s/p . Noted to be in AFib during labor so cardiology consulted.    Review of Studies:  EKG 12/15 9AM: Afib RVR, rate 101  EKG  12/15 10pm: NSR, rate 81, no ischemic changes  TTE 23: Normal biventricular size/function. Mild LVH. Mildly dilated LA. PFO vs small ASD. Mild MR. Mild TR. PASP 45. Trivial pericardial effusion.     #New onset atrial fibrillation  - EKG noted with Afib during labor/prior to OR and now returned to NSR - likely paroxysmal  - CHADSVASC 1 and Afib duration < 24h. Would not start AC at this time.   - Prior to discharge, consult EP for placement of loop recorder  - replete electrolytes for goal K>4, Mg>2    #gHTN  - BP elevated on arrival to 170/110s  - Switch from nifedipine to labetolol 200mg BID    #SMA syndrome  - pt can f/u with vascular    Pt can follow up with Dr. Latonia Skaggs as an outpatient.

## 2023-12-18 NOTE — PROGRESS NOTE ADULT - ATTENDING COMMENTS
Patient is a 22 yo Female with PMH of Superior Mesenteric Artery syndrome, Cardiac arrest after previous delivery, who presented with  contractions at 34 weeks, found to have gHTN and Pre-Eclampsia with severe features, now s/p  with clinical course notable for AFib during labor with spontaneous Cardioversion, as well as PFO vs ASD finding on Echo    Review of Studies:  - EKG 12/15 9AM: Afib RVR, rate 101  - EKG  12/15 10pm: NSR, rate 81, no ischemic changes  - TTE 2023: Patient was in normal sinus rhythm during the study. Normal left and right ventricular size and systolic function. Mild symmetric left ventricular hypertrophy. Mildly dilated left atrium. Color flow doppler reveals evidence of a patent foramen ovale Vs a small secundum atrial septal defect. The flow is predominantly left to right. Mild mitral regurgitation. Mild tricuspid regurgitation. Pulmonary hypertension present, pulmonary artery systolic pressure is 45 mmHg. Trivial pericardial effusion.    # Paroxysmal Atrial Fibrillation  - Patient with no history of Tachyarrhythmia noted to be in Afib prior to Csection. She has remained in NSR since  - Echo performed this Hospitalization revealed normal BIV Function with mildly dilated LA without significant Valvular Heart Disease  - At this Time patient would benefit from ambulatory ECG monitoring to assess Afib's burden. As episodes are infrequent, she would likely need a Loop Recorder over an event monitor.   - Please consult EP for Inpatient loop Recorder consideration  - CHADVASC of 1 (Sex only). Will not consider gHTN as a factor, as provoked and anticipated to improve post delivery. Cont to defer AC for now  - Would favor Labetalol 200 mg PO BID over Nifedipine for now for both BP and HR Control    # gHTN, and Pre-Eclampsia with severe features  - Started on nifedipine 30 mg po daily with SBP ranging from 118-143  - Please switch To Labetalol 200 mg po BID starting this evening 12/18 pm     # Patent Foramen ovale Vs a small secundum atrial septal defect  - Would repeat Echo in 3 months post delivery to assess size of LA as well as Communication. Should communication persist would consider outpatient BRADY for better assessment    # PHTN  - Mild MR with PASP of 45  - Clinically patient Is well compensated and euvolemic. Will need repeat echo as an outpatient for monitoring     Please ensure patient has outpatient follow up established with Dr. Latonia Skaggs upon [place]. Would suggest attempting to get appointment PRIOR to patient's DC to see if insurance compatible  - Cardiology will continue to follow, please call with any questions Patient is a 24 yo Female with PMH of Superior Mesenteric Artery syndrome, Cardiac arrest after previous delivery, who presented with  contractions at 34 weeks, found to have gHTN and Pre-Eclampsia with severe features, now s/p  with clinical course notable for AFib during labor with spontaneous Cardioversion, as well as PFO vs ASD finding on Echo    Review of Studies:  - EKG 12/15 9AM: Afib RVR, rate 101  - EKG  12/15 10pm: NSR, rate 81, no ischemic changes  - TTE 2023: Patient was in normal sinus rhythm during the study. Normal left and right ventricular size and systolic function. Mild symmetric left ventricular hypertrophy. Mildly dilated left atrium. Color flow doppler reveals evidence of a patent foramen ovale Vs a small secundum atrial septal defect. The flow is predominantly left to right. Mild mitral regurgitation. Mild tricuspid regurgitation. Pulmonary hypertension present, pulmonary artery systolic pressure is 45 mmHg. Trivial pericardial effusion.    # Paroxysmal Atrial Fibrillation  - Patient with no history of Tachyarrhythmia noted to be in Afib prior to Csection. She has remained in NSR since  - Echo performed this Hospitalization revealed normal BIV Function with mildly dilated LA without significant Valvular Heart Disease  - At this Time patient would benefit from ambulatory ECG monitoring to assess Afib's burden. As episodes are infrequent, she would likely need a Loop Recorder over an event monitor.   - Please consult EP for Inpatient loop Recorder consideration  - CHADVASC of 1 (Sex only). Will not consider gHTN as a factor, as provoked and anticipated to improve post delivery. Cont to defer AC for now  - Would favor Labetalol 200 mg PO BID over Nifedipine for now for both BP and HR Control    # gHTN, and Pre-Eclampsia with severe features  - Started on nifedipine 30 mg po daily with SBP ranging from 118-143  - Please switch To Labetalol 200 mg po BID starting this evening 12/18 pm     # Patent Foramen ovale Vs a small secundum atrial septal defect  - Would repeat Echo in 3 months post delivery to assess size of LA as well as Communication. Should communication persist would consider outpatient BRADY for better assessment    # PHTN  - Mild MR with PASP of 45  - Clinically patient Is well compensated and euvolemic. Will need repeat echo as an outpatient for monitoring     Please ensure patient has outpatient follow up established with Dr. Latonia Skaggs upon [place]. Would suggest attempting to get appointment PRIOR to patient's DC to see if insurance compatible  - Cardiology will continue to follow, please call with any questions

## 2023-12-19 PROCEDURE — 99233 SBSQ HOSP IP/OBS HIGH 50: CPT | Mod: GC

## 2023-12-19 PROCEDURE — 99223 1ST HOSP IP/OBS HIGH 75: CPT

## 2023-12-19 PROCEDURE — 99232 SBSQ HOSP IP/OBS MODERATE 35: CPT

## 2023-12-19 RX ORDER — HYDRALAZINE HCL 50 MG
10 TABLET ORAL ONCE
Refills: 0 | Status: COMPLETED | OUTPATIENT
Start: 2023-12-19 | End: 2023-12-19

## 2023-12-19 RX ORDER — METOCLOPRAMIDE HCL 10 MG
10 TABLET ORAL ONCE
Refills: 0 | Status: COMPLETED | OUTPATIENT
Start: 2023-12-19 | End: 2023-12-19

## 2023-12-19 RX ORDER — ALBUTEROL 90 UG/1
2 AEROSOL, METERED ORAL
Qty: 0 | Refills: 0 | DISCHARGE
Start: 2023-12-19

## 2023-12-19 RX ORDER — LABETALOL HCL 100 MG
100 TABLET ORAL ONCE
Refills: 0 | Status: COMPLETED | OUTPATIENT
Start: 2023-12-19 | End: 2023-12-19

## 2023-12-19 RX ORDER — LABETALOL HCL 100 MG
300 TABLET ORAL EVERY 8 HOURS
Refills: 0 | Status: DISCONTINUED | OUTPATIENT
Start: 2023-12-19 | End: 2023-12-20

## 2023-12-19 RX ORDER — LABETALOL HCL 100 MG
300 TABLET ORAL EVERY 12 HOURS
Refills: 0 | Status: DISCONTINUED | OUTPATIENT
Start: 2023-12-19 | End: 2023-12-19

## 2023-12-19 RX ORDER — SENNA PLUS 8.6 MG/1
1 TABLET ORAL DAILY
Refills: 0 | Status: DISCONTINUED | OUTPATIENT
Start: 2023-12-19 | End: 2023-12-21

## 2023-12-19 RX ORDER — ACETAMINOPHEN 500 MG
3 TABLET ORAL
Qty: 0 | Refills: 0 | DISCHARGE
Start: 2023-12-19

## 2023-12-19 RX ORDER — OXYCODONE HYDROCHLORIDE 5 MG/1
1 TABLET ORAL
Qty: 8 | Refills: 0
Start: 2023-12-19 | End: 2023-12-20

## 2023-12-19 RX ORDER — IBUPROFEN 200 MG
1 TABLET ORAL
Qty: 0 | Refills: 0 | DISCHARGE
Start: 2023-12-19

## 2023-12-19 RX ADMIN — Medication 300 MILLIGRAM(S): at 15:16

## 2023-12-19 RX ADMIN — Medication 975 MILLIGRAM(S): at 15:06

## 2023-12-19 RX ADMIN — OXYCODONE HYDROCHLORIDE 5 MILLIGRAM(S): 5 TABLET ORAL at 17:30

## 2023-12-19 RX ADMIN — ENOXAPARIN SODIUM 40 MILLIGRAM(S): 100 INJECTION SUBCUTANEOUS at 07:01

## 2023-12-19 RX ADMIN — MOMETASONE FUROATE 2 PUFF(S): 220 INHALANT RESPIRATORY (INHALATION) at 11:46

## 2023-12-19 RX ADMIN — OXYCODONE HYDROCHLORIDE 5 MILLIGRAM(S): 5 TABLET ORAL at 04:00

## 2023-12-19 RX ADMIN — Medication 100 MILLIGRAM(S): at 10:01

## 2023-12-19 RX ADMIN — Medication 200 MILLIGRAM(S): at 06:42

## 2023-12-19 RX ADMIN — Medication 300 MILLIGRAM(S): at 22:00

## 2023-12-19 RX ADMIN — Medication 975 MILLIGRAM(S): at 22:27

## 2023-12-19 RX ADMIN — OXYCODONE HYDROCHLORIDE 5 MILLIGRAM(S): 5 TABLET ORAL at 22:00

## 2023-12-19 RX ADMIN — OXYCODONE HYDROCHLORIDE 5 MILLIGRAM(S): 5 TABLET ORAL at 07:01

## 2023-12-19 RX ADMIN — POLYETHYLENE GLYCOL 3350 17 GRAM(S): 17 POWDER, FOR SOLUTION ORAL at 11:46

## 2023-12-19 RX ADMIN — Medication 600 MILLIGRAM(S): at 11:46

## 2023-12-19 RX ADMIN — Medication 975 MILLIGRAM(S): at 10:00

## 2023-12-19 RX ADMIN — Medication 5 MILLIGRAM(S): at 22:31

## 2023-12-19 RX ADMIN — OXYCODONE HYDROCHLORIDE 5 MILLIGRAM(S): 5 TABLET ORAL at 06:41

## 2023-12-19 RX ADMIN — Medication 975 MILLIGRAM(S): at 22:00

## 2023-12-19 RX ADMIN — Medication 10 MILLIGRAM(S): at 03:39

## 2023-12-19 RX ADMIN — OXYCODONE HYDROCHLORIDE 5 MILLIGRAM(S): 5 TABLET ORAL at 09:59

## 2023-12-19 RX ADMIN — OXYCODONE HYDROCHLORIDE 5 MILLIGRAM(S): 5 TABLET ORAL at 01:00

## 2023-12-19 RX ADMIN — SENNA PLUS 1 TABLET(S): 8.6 TABLET ORAL at 11:47

## 2023-12-19 RX ADMIN — OXYCODONE HYDROCHLORIDE 5 MILLIGRAM(S): 5 TABLET ORAL at 18:00

## 2023-12-19 RX ADMIN — Medication 600 MILLIGRAM(S): at 17:24

## 2023-12-19 RX ADMIN — Medication 10 MILLIGRAM(S): at 19:08

## 2023-12-19 RX ADMIN — Medication 600 MILLIGRAM(S): at 00:17

## 2023-12-19 RX ADMIN — OXYCODONE HYDROCHLORIDE 5 MILLIGRAM(S): 5 TABLET ORAL at 03:39

## 2023-12-19 RX ADMIN — OXYCODONE HYDROCHLORIDE 5 MILLIGRAM(S): 5 TABLET ORAL at 00:17

## 2023-12-19 RX ADMIN — Medication 5 MILLIGRAM(S): at 00:18

## 2023-12-19 RX ADMIN — OXYCODONE HYDROCHLORIDE 5 MILLIGRAM(S): 5 TABLET ORAL at 10:30

## 2023-12-19 NOTE — PROGRESS NOTE ADULT - ASSESSMENT
A/P: 23y s/p  section, POD#4, stable  - PEC w/ SF: pt has mild range BPs, no toxic complaints, on Nifedipine 30 BID  - Hypothermia- r/o sepsis: pt has normal temp at this time with normal CXR, Urine Cx; Lactate downtrended from 2.3 to 1.3, BCx pending (NGx3)  - Arrythmia: EKG 12/15 : c/f afib with RVR; TTE  shows patent foramen ovale and small secundum atrial septal defect with EF 67% with mild pulmonary htn; Cardiology following - plan to appreciate recs ( afib in labor now resolved, likely paroxysmal , no anticoag at this time, replete lytes ,get nonurgent echo. Per cards, nothing to do for PFO and ASD. Patient to f/u with Dr. Skaggs as o/p) On : d/c nifedipine, start labetalol 200mg BID, will need loop recorder prior to d/c   - Hyponatremia: started with Na 127 now on NS as of 12/15 and uptrended to 135 # IM consult : trend BMP; no abx ; no evidence SIADH   -  Pain: PO motrin q6hrs, tylenol q8hrs, oxycodone for severe pain PRN  -  Post-operatively labs: post-op Hgb , hemodynamically stable, no symptoms of anemia   -  GI: tolerating regular diet, passing gas  -  : s/p woodard , urinating without difficulty  -  DVT prophylaxis: encouraged increased ambulation, SCDs, Lovenox  -  Dispo: POD 3 or 4

## 2023-12-19 NOTE — PROGRESS NOTE ADULT - ATTENDING COMMENTS
Patient is a 24 yo Female with PMH of Superior Mesenteric Artery syndrome, Cardiac arrest after previous delivery, who presented with  contractions at 34 weeks, found to have gHTN and Pre-Eclampsia with severe features, now s/p  with clinical course notable for AFib during labor with spontaneous Cardioversion, as well as PFO vs ASD finding on Echo    Review of Studies:  - EKG 12/15 9AM: Afib RVR, rate 101  - EKG  12/15 10pm: NSR, rate 81, no ischemic changes  - TTE 2023: Patient was in normal sinus rhythm during the study. Normal left and right ventricular size and systolic function. Mild symmetric left ventricular hypertrophy. Mildly dilated left atrium. Color flow doppler reveals evidence of a patent foramen ovale Vs a small secundum atrial septal defect. The flow is predominantly left to right. Mild mitral regurgitation. Mild tricuspid regurgitation. Pulmonary hypertension present, pulmonary artery systolic pressure is 45 mmHg. Trivial pericardial effusion.    # Paroxysmal Atrial Fibrillation  - Patient with no history of Tachyarrhythmia noted to be in Afib prior to Csection. She has remained in NSR since  - Echo performed this Hospitalization revealed normal BIV Function with mildly dilated LA without significant Valvular Heart Disease  - At this Time patient would benefit from ambulatory ECG monitoring to assess Afib's burden. She was seen by EP, with plan for event monitor placement in 3-4 weeks with scheduled follow up with Dr Schilling.  - CHADVASC of 1 (Sex only). Will not consider gHTN as a factor, as provoked and anticipated to improve post delivery. Cont to defer AC for now  - Would Increase Labetalol to 300 mg PO TID  for both BP and HR Control    # gHTN, and Pre-Eclampsia with severe features  - Started on nifedipine 30 mg po daily with SBP ranging from 140's-150's  - Please increase Labetalol to 300 mg po TID starting with strict holding parameters (Hold for SBP<120) to maintain better BP goal    # Patent Foramen ovale Vs a small secundum atrial septal defect  - Would repeat Echo in 3 months post delivery to assess size of LA as well as Communication. Should communication persist would consider outpatient BRADY for better assessment    # PHTN  - Mild MR with PASP of 45  - Clinically patient Is well compensated and euvolemic. Will need repeat echo as an outpatient for monitoring     Please ensure patient has outpatient follow up established with Dr. Latonia Skaggs upon [place]. Would suggest attempting to get appointment PRIOR to patient's DC to see if insurance compatible  - Cardiology will continue to follow, please call with any questions .

## 2023-12-19 NOTE — CONSULT NOTE ADULT - SUBJECTIVE AND OBJECTIVE BOX
HPI:  24 yo female with asthma, superior mesenteric artery syndrome, cardiac arrest after previous delivery, who had an episode of afib pre delivery now in NSR,      Per patient, she left AMA from [facility] earlier this evening after presenting with  contractions. Was diagnosed with gHTN at [facility] and noted to have  a BP of 176/114 on arrival. S/p  . EKG prior to labor showed atrial fibrillation show cardiology consulted.  She self converted to NSR.  She states she has a prior history of afib and is followed by Dr. Skaggs.      Per pt, she was diagnosed with SMA syndrome at [facility] and was seeing vascular surgery with plan for possible surgical intervention, but then she became pregnant so surgery canceled. She also notes that after giving birth to her son in 2016 she had a cardiac arrest s/p CPR. Does not know underlying cause.  No family hx of sudden cardiac death, arrhythmia or early CAD but her dad had a CVA in his 50s.    Cardiology recommended an implantable loop recorder.        PAST MEDICAL & SURGICAL HISTORY:  ADHD  Asthma  Bipolar disorder  Manic depression  SMAS (superior mesenteric artery syndrome)  S/P D&C (status post dilation and curettage)              Social History:no drugs    Inpatient Medications:   acetaminophen     Tablet .. 975 milliGRAM(s) Oral <User Schedule>  albuterol    90 MICROgram(s) HFA Inhaler 2 Puff(s) Inhalation every 6 hours PRN  bisacodyl 5 milliGRAM(s) Oral at bedtime  diphenhydrAMINE 25 milliGRAM(s) Oral every 6 hours PRN  enoxaparin Injectable 40 milliGRAM(s) SubCutaneous every 24 hours  ibuprofen  Tablet. 600 milliGRAM(s) Oral every 6 hours  labetalol 300 milliGRAM(s) Oral every 12 hours  lanolin Ointment 1 Application(s) Topical every 6 hours PRN  magnesium hydroxide Suspension 30 milliLiter(s) Oral two times a day PRN  mometasone 220 MICROgram(s) Inhaler 2 Puff(s) Inhalation daily  oxyCODONE    IR 5 milliGRAM(s) Oral every 3 hours PRN  oxyCODONE    IR 5 milliGRAM(s) Oral once PRN  oxytocin Infusion 333.333 milliUNIT(s)/Min IV Continuous <Continuous>  polyethylene glycol 3350 17 Gram(s) Oral daily  senna 1 Tablet(s) Oral daily PRN  simethicone 80 milliGRAM(s) Chew every 4 hours PRN      Allergies: shellfish (Anaphylaxis)  No Known Drug Allergies  latex (Rash)      ROS:   CONSTITUTIONAL: No fever, weight loss   EYES: Pt denies  RESPIRATORY: No cough, wheezing, chills or hemoptysis; No Shortness of Breath  CARDIOVASCULAR: see HPI  GASTROINTESTINAL: Pt denies  NEUROLOGICAL: Pt denies  SKIN: Pt denies   PSYCHIATRIC: Pt denies  HEME/LYMPH: Pt denies    PHYSICAL:  T(C): 36.6 (23 @ 10:00), Max: 36.8 (23 @ 06:42)  HR: 72 (23 @ 10:00) (71 - 78)  BP: 141/102 (23 @ 10:00) (141/102 - 151/93)  RR: 16 (23 @ 10:00) (16 - 18)  SpO2: 100% (23 @ 10:00) (99% - 100%)     Appearance: No acute distress, well developed  Eyes: normal appearing conjunctiva, pupils and eyelids  Cardiovascular: Normal S1 S2, No JVD   Respiratory: Lungs clear    Gastrointestinal:  Soft   Neurologic:  No deficit noted  Psych: A&Ox3   Musculoskeletal: no deformity noted   Skin: no rash noted, normal color and pigmentation.           EK/15 afib with a ventricular rate of 100    Telemetry: none    ECHO:  e< from: TTE Echo Complete w/o Contrast w/ Doppler (23 @ 13:36) >     1. Patient was in normal sinus rhythm during the study.   2. Normal left and right ventricular size and systolic function.   3. Mild symmetric left ventricular hypertrophy.   4. Mildly dilated left atrium.   5. Color flow doppler reveals evidence of a patent foramen ovale Vs a small secundum atrial septal defect. The flow is predominantly left to right.   6. Mild mitral regurgitation.   7. Mild tricuspid regurgitation.   8. Pulmonary hypertension present, pulmonary artery systolic pressure is 45 mmHg.   9. Trivial pericardial effusion.         Assessment Plan:  24 yo female with asthma, superior mesenteric artery syndrome, cardiac arrest after previous delivery, who had an episode of afib pre delivery now in NSR,   She notes a prior history of afib.  CHADS 1 for female.  We discussed labor is a high vagal state.  Given the fact that she is breastfeeding and 23 years old would NOT recommend a loop recorder.   This would not change our management at this time.  Will send her an event monitor in 3-4 weeks and then she can follow up in clinic [phone] with Dr. Schilling. Please get an EKG in NSR to make sure EKG normal given prior history. HPI:  24 yo female with asthma, superior mesenteric artery syndrome, cardiac arrest after previous delivery, who had an episode of afib pre delivery now in NSR,      Per patient, she left AMA from [facility] earlier this evening after presenting with  contractions. Was diagnosed with gHTN at [facility] and noted to have  a BP of 176/114 on arrival. S/p  . EKG prior to labor showed atrial fibrillation show cardiology consulted.  She self converted to NSR.  She states she has a prior history of afib and is followed by Dr. Skaggs.      Per pt, she was diagnosed with SMA syndrome at [facility] and was seeing vascular surgery with plan for possible surgical intervention, but then she became pregnant so surgery canceled. She also notes that after giving birth to her son in 2016 she had a cardiac arrest s/p CPR. Does not know underlying cause.  No family hx of sudden cardiac death, arrhythmia or early CAD but her dad had a CVA in his 50s.    Cardiology recommended an implantable loop recorder.        PAST MEDICAL & SURGICAL HISTORY:  ADHD  Asthma  Bipolar disorder  Manic depression  SMAS (superior mesenteric artery syndrome)  S/P D&C (status post dilation and curettage)              Social History:no drugs    Inpatient Medications:   acetaminophen     Tablet .. 975 milliGRAM(s) Oral <User Schedule>  albuterol    90 MICROgram(s) HFA Inhaler 2 Puff(s) Inhalation every 6 hours PRN  bisacodyl 5 milliGRAM(s) Oral at bedtime  diphenhydrAMINE 25 milliGRAM(s) Oral every 6 hours PRN  enoxaparin Injectable 40 milliGRAM(s) SubCutaneous every 24 hours  ibuprofen  Tablet. 600 milliGRAM(s) Oral every 6 hours  labetalol 300 milliGRAM(s) Oral every 12 hours  lanolin Ointment 1 Application(s) Topical every 6 hours PRN  magnesium hydroxide Suspension 30 milliLiter(s) Oral two times a day PRN  mometasone 220 MICROgram(s) Inhaler 2 Puff(s) Inhalation daily  oxyCODONE    IR 5 milliGRAM(s) Oral every 3 hours PRN  oxyCODONE    IR 5 milliGRAM(s) Oral once PRN  oxytocin Infusion 333.333 milliUNIT(s)/Min IV Continuous <Continuous>  polyethylene glycol 3350 17 Gram(s) Oral daily  senna 1 Tablet(s) Oral daily PRN  simethicone 80 milliGRAM(s) Chew every 4 hours PRN      Allergies: shellfish (Anaphylaxis)  No Known Drug Allergies  latex (Rash)      ROS:   CONSTITUTIONAL: No fever, weight loss   EYES: Pt denies  RESPIRATORY: No cough, wheezing, chills or hemoptysis; No Shortness of Breath  CARDIOVASCULAR: see HPI  GASTROINTESTINAL: Pt denies  NEUROLOGICAL: Pt denies  SKIN: Pt denies   PSYCHIATRIC: Pt denies  HEME/LYMPH: Pt denies    PHYSICAL:  T(C): 36.6 (23 @ 10:00), Max: 36.8 (23 @ 06:42)  HR: 72 (23 @ 10:00) (71 - 78)  BP: 141/102 (23 @ 10:00) (141/102 - 151/93)  RR: 16 (23 @ 10:00) (16 - 18)  SpO2: 100% (23 @ 10:00) (99% - 100%)     Appearance: No acute distress, well developed  Eyes: normal appearing conjunctiva, pupils and eyelids  Cardiovascular: Normal S1 S2, No JVD   Respiratory: Lungs clear    Gastrointestinal:  Soft   Neurologic:  No deficit noted  Psych: A&Ox3   Musculoskeletal: no deformity noted   Skin: no rash noted, normal color and pigmentation.           EK/15 afib with a ventricular rate of 100    Telemetry: none    ECHO:  e< from: TTE Echo Complete w/o Contrast w/ Doppler (23 @ 13:36) >     1. Patient was in normal sinus rhythm during the study.   2. Normal left and right ventricular size and systolic function.   3. Mild symmetric left ventricular hypertrophy.   4. Mildly dilated left atrium.   5. Color flow doppler reveals evidence of a patent foramen ovale Vs a small secundum atrial septal defect. The flow is predominantly left to right.   6. Mild mitral regurgitation.   7. Mild tricuspid regurgitation.   8. Pulmonary hypertension present, pulmonary artery systolic pressure is 45 mmHg.   9. Trivial pericardial effusion.         Assessment Plan:  24 yo female with asthma, superior mesenteric artery syndrome, cardiac arrest after previous delivery, who had an episode of afib pre delivery now in NSR,   She notes a prior history of afib.  CHADS 1 for female.  We discussed labor is a high vagal state.  Given the fact that she is breastfeeding and 23 years old would NOT recommend a loop recorder.   This would not change our management at this time.  Will send her an event monitor in 3-4 weeks and then she can follow up in clinic [phone] with Dr. Schilling. Please get an EKG in NSR to make sure EKG normal given prior history.

## 2023-12-19 NOTE — PROGRESS NOTE ADULT - ASSESSMENT
24 yo F with PMH asthma, superior mesenteric artery syndrome, cardiac arrest after previous delivery, who presented with  contractions at 34 weeks, found to have gHTN and PEC w/ SF, now s/p . Noted to be in AFib during labor so cardiology consulted.    Review of Studies:  EKG 12/15 9AM: Afib RVR, rate 101  EKG  12/15 10pm: NSR, rate 81, no ischemic changes  TTE 23: Normal biventricular size/function. Mild LVH. Mildly dilated LA. PFO vs small ASD. Mild MR. Mild TR. PASP 45. Trivial pericardial effusion.     #New onset atrial fibrillation  - EKG noted with Afib during labor/prior to OR and now returned to NSR - likely paroxysmal  - CHADSVASC 1 and Afib duration < 24h. Would not start AC at this time.   - replete electrolytes for goal K>4, Mg>2  - EP follow-up for event monitor    #gHTN  - BP elevated on arrival to 170/110s  - Increase labetolol to 300mg TID    #SMA syndrome  - pt can f/u with vascular    Pt can follow up with Dr. Latonia Skaggs as an outpatient.           24 yo F with PMH asthma, superior mesenteric artery syndrome, cardiac arrest after previous delivery, who presented with  contractions at 34 weeks, found to have gHTN and PEC w/ SF, now s/p . Noted to be in AFib during labor so cardiology consulted.    Review of Studies:  EKG 12/15 9AM: Afib RVR, rate 101  EKG  12/15 10pm: NSR, rate 81, no ischemic changes  TTE 23: Normal biventricular size/function. Mild LVH. Mildly dilated LA. PFO vs small ASD. Mild MR. Mild TR. PASP 45. Trivial pericardial effusion.     #New onset atrial fibrillation  - EKG noted with Afib during labor/prior to OR and now returned to NSR - likely paroxysmal  - CHADSVASC 1 and Afib duration < 24h. Would not start AC at this time.   - replete electrolytes for goal K>4, Mg>2  - EP follow-up for event monitor    #gHTN  - BP elevated on arrival to 170/110s  - Increase labetolol to 300mg TID    #SMA syndrome  - pt can f/u with vascular    Pt can follow up with Dr. Latonia Sakggs as an outpatient.

## 2023-12-19 NOTE — PROGRESS NOTE ADULT - ASSESSMENT
23F with SMA syndrome, previous hx of cardiac arrest s/p delivery 2016, asthma (Flovent and PRN albuterol) who presented for  contracts. Pt is now s/p Csection 12/15. Course complicated by PEC with SF, requiring IV magnesium. Post op, pt was noted to be hypothermic to 93F, as well as in Afib. Cardiology consulted for management of Afib and Medicine consulted for management of post-op hypothermia and hyponatremia as well as co-management.    #New onset AFib  #gestational HTN  #SMA syndrome   Cardiology consullted- reccs appreciated  TTE: normal LV function, mild MR, Color flow doppler reveals evidence of a patent foramen ovale Vs a small secundum atrial septal defect  per cardiology, transitioned from Nifedipine to Labetalol 200mg PO BID  no need for AC at this time  per EP, no plan for loop recorder at this time as patient is actively breast feeding. Pt will follow up outpatient  pt planning to transition care to [facility] for continued management and follow up    #constipation  pt currently on Oxycodone, likely contributing to constipation  recommend increasing miralax from qD to BID, as well as continued dulcolax and senna    #asthma  uses flovent and albuterol PRN at home     #hypothermia  #SIRS - RESOLVED  Post procedure, pt hypothermic to 93.8, with HR of . Also with leukocytosis of 15  Pt meets SIRS criteria, no known source. Pt denies fevers, chills, shortness of breath, cough, abdominal pain, n/v/d  suspect hypothermia to be secondary to post-anesthesia from c section, as patient's temperature has now resolved without intervention  tachycardia likely in setting of pain and leukocytosis likely reactive, as also now improving post procedure. lactate cleared after IVF  pt is s/p Ancef, ampicillin and Azithro post procedure. CXR with no acute infiltrate. UA negative. No signs of cellulitis on exam  recommend monitoring off antibiotics at this time  f/u BCx    #hypovolemic hypoNa - RESOLVED  Pt initially presented with sodium of 132, then dropped to Na of 127, now improved to 135  encourage PO intake, will continue to trend BMP    Case discussed and seen with Dr. Matias. Med consult will continue to follow. No medicine contraindications to discharge   23F with SMA syndrome, previous hx of cardiac arrest s/p delivery 2016, asthma (Flovent and PRN albuterol) who presented for  contracts. Pt is now s/p Csection 12/15. Course complicated by PEC with SF, requiring IV magnesium. Post op, pt was noted to be hypothermic to 93F, as well as in Afib. Cardiology consulted for management of Afib and Medicine consulted for management of post-op hypothermia and hyponatremia as well as co-management.    #New onset AFib  #gestational HTN  #SMA syndrome   Cardiology consullted- reccs appreciated  TTE: normal LV function, mild MR, Color flow doppler reveals evidence of a patent foramen ovale Vs a small secundum atrial septal defect  per cardiology, transitioned from Nifedipine to Labetalol 200mg PO BID  no need for AC at this time  per EP, no plan for loop recorder at this time as patient is actively breast feeding. Pt will follow up outpatient  pt planning to transition care to [facility] for continued management and follow up    #constipation  pt currently on Oxycodone, likely contributing to constipation  recommend increasing miralax from qD to BID, as well as continued dulcolax and senna    #asthma  uses flovent and albuterol PRN at home     #hypothermia  #SIRS - RESOLVED  Post procedure, pt hypothermic to 93.8, with HR of . Also with leukocytosis of 15  Pt meets SIRS criteria, no known source. Pt denies fevers, chills, shortness of breath, cough, abdominal pain, n/v/d  suspect hypothermia to be secondary to post-anesthesia from c section, as patient's temperature has now resolved without intervention  tachycardia likely in setting of pain and leukocytosis likely reactive, as also now improving post procedure. lactate cleared after IVF  pt is s/p Ancef, ampicillin and Azithro post procedure. CXR with no acute infiltrate. UA negative. No signs of cellulitis on exam  recommend monitoring off antibiotics at this time  f/u BCx    #hypovolemic hypoNa - RESOLVED  Pt initially presented with sodium of 132, then dropped to Na of 127, now improved to 135  encourage PO intake, will continue to trend BMP    Case discussed and seen with Dr. Matias. Med consult will continue to follow. No medicine contraindications to discharge

## 2023-12-19 NOTE — PROGRESS NOTE ADULT - SUBJECTIVE AND OBJECTIVE BOX
OVERNIGHT EVENTS: MARC    SUBJECTIVE:  Patient seen and examined at bedside. Reports feeling better, no chest pain or shortness of breath or dizziness. States she has been able to breastfeed since yesterday.    Vital Signs Last 12 Hrs  T(F): 97.8 (12-19-23 @ 10:00), Max: 98.2 (12-19-23 @ 06:42)  HR: 72 (12-19-23 @ 10:00) (71 - 77)  BP: 141/102 (12-19-23 @ 10:00) (141/102 - 147/97)  BP(mean): --  RR: 16 (12-19-23 @ 10:00) (16 - 18)  SpO2: 100% (12-19-23 @ 10:00) (100% - 100%)  I&O's Summary      PHYSICAL EXAM:  GENERAL: NAD, sitting up in bed  HEAD:  Atraumatic, Normocephalic  EYES: EOMI, PERRLA, conjunctiva and sclera clear  ENMT: Moist mucous membranes  NECK: Supple  NERVOUS SYSTEM:  Alert & Oriented X3, Good concentration  CHEST/LUNG: Clear to percussion bilaterally; No rales, rhonchi, wheezing, or rubs  HEART: Regular rate and rhythm; No murmurs, rubs, or gallops  ABDOMEN: Soft, Nontender, Nondistended; Bowel sounds present  EXTREMITIES:  2+ Peripheral Pulses, No edema      LABS:                  RADIOLOGY & ADDITIONAL TESTS:    MEDICATIONS  (STANDING):  acetaminophen     Tablet .. 975 milliGRAM(s) Oral <User Schedule>  bisacodyl 5 milliGRAM(s) Oral at bedtime  enoxaparin Injectable 40 milliGRAM(s) SubCutaneous every 24 hours  ibuprofen  Tablet. 600 milliGRAM(s) Oral every 6 hours  labetalol 300 milliGRAM(s) Oral every 12 hours  mometasone 220 MICROgram(s) Inhaler 2 Puff(s) Inhalation daily  oxytocin Infusion 333.333 milliUNIT(s)/Min (1000 mL/Hr) IV Continuous <Continuous>  polyethylene glycol 3350 17 Gram(s) Oral daily    MEDICATIONS  (PRN):  albuterol    90 MICROgram(s) HFA Inhaler 2 Puff(s) Inhalation every 6 hours PRN Shortness of Breath and/or Wheezing  diphenhydrAMINE 25 milliGRAM(s) Oral every 6 hours PRN Pruritus  lanolin Ointment 1 Application(s) Topical every 6 hours PRN Sore Nipples  magnesium hydroxide Suspension 30 milliLiter(s) Oral two times a day PRN Constipation  oxyCODONE    IR 5 milliGRAM(s) Oral once PRN Moderate to Severe Pain (4-10)  oxyCODONE    IR 5 milliGRAM(s) Oral every 3 hours PRN Moderate to Severe Pain (4-10)  senna 1 Tablet(s) Oral daily PRN Constipation  simethicone 80 milliGRAM(s) Chew every 4 hours PRN Gas

## 2023-12-19 NOTE — PROGRESS NOTE ADULT - ATTENDING COMMENTS
23F  with PMHx of Bipolar disorder, manic depression, ADHD, SMA syndrome, previous hx of cardiac arrest s/p delivery 2016, asthma (Flovent and PRN albuterol) at 34w0d, was diagnosed with gestational HTN at [facility] who presented for  contracts. Pt is now s/p  12/15. Course complicated by PEC with SF, requiring IV magnesium. Post op, pt was noted to be hypothermic to 93*F, as well as in new onset Afib. Cardiology consulted for management of Afib which converted to NSR spontaneously. Pt met SIRS criteria with lactic acidosis 2.3- .1.3 after hydration, and given iv abx with Ampicillin, Cefazolin and Azithromycin with pruritis ( no rash) and given Benadryl 50mg IM. Medicine consulted  () for management of post-op hypothermia/sepsis and hyponatremia.     Pt seen and examined with Dr. Busch.     # SIRS  # Leukocytosis   # lactic acidosis   - no clinical evidence of occult infection, UA, CXR, negative   - would observe off antibiotic  - HIV negative ( 23)  - no protein gap   - trend WBC 16K->13K-> 12.65K, likely reactive leukocytosis   - lactate normalized 2.3-> 1.3    # hypothermia ( resolved)   - likely related to spinal anesthesia fro    - supportive care     # Hyponatremia ( resolved)   - urine lytes no evidence of SIADH  - encourage oral hydration   - trend Na 127-> 130 - >135    # New onset AFib  # gHTN  # SMA syndrome   - Cardiology f/u appreciated   - TTE reviewed   - Cards rec: switched Nifedipine XL to Labetalol and increased to 300mg po tid, hold for SBP<120  - EP consult appreciated, no need for ILR and f/u office for event monitoring   - collaterals for the SMA syndrome ( pt follows with Vascular surgery at [facility])   - Refer to our Vascular surgery clinic since pt wants to transfer her care to [facility]     # Post-  pain   - c/w APAP and Ibuprofen  - will limit use of oxycodone     # Constipation  - laxative on Miralax 17gm daily and Bisacodyl 5mg qHS s    # Disposition: per OB. Pt requesting prescription for her labetalol, Flovent MDI, albuterol and fleet enema ( Pt claims Miralax, and Dulcolax don't work for her)     Med consult resident Dr. Busch to d/w OB team . 23F  with PMHx of Bipolar disorder, manic depression, ADHD, SMA syndrome, previous hx of cardiac arrest s/p delivery 2016, asthma (Flovent and PRN albuterol) at 34w0d, was diagnosed with gestational HTN at [facility] who presented for  contracts. Pt is now s/p  12/15. Course complicated by PEC with SF, requiring IV magnesium. Post op, pt was noted to be hypothermic to 93*F, as well as in new onset Afib. Cardiology consulted for management of Afib which converted to NSR spontaneously. Pt met SIRS criteria with lactic acidosis 2.3- .1.3 after hydration, and given iv abx with Ampicillin, Cefazolin and Azithromycin with pruritis ( no rash) and given Benadryl 50mg IM. Medicine consulted  () for management of post-op hypothermia/sepsis and hyponatremia.     Pt seen and examined with Dr. Busch.     # SIRS  # Leukocytosis   # lactic acidosis   - no clinical evidence of occult infection, UA, CXR, negative   - would observe off antibiotic  - HIV negative ( 23)  - no protein gap   - trend WBC 16K->13K-> 12.65K, likely reactive leukocytosis   - lactate normalized 2.3-> 1.3    # hypothermia ( resolved)   - likely related to spinal anesthesia fro    - supportive care     # Hyponatremia ( resolved)   - urine lytes no evidence of SIADH  - encourage oral hydration   - trend Na 127-> 130 - >135    # New onset AFib  # gHTN  # SMA syndrome   - Cardiology f/u appreciated   - TTE reviewed   - Cards rec: switched Nifedipine XL to Labetalol and increased to 300mg po tid, hold for SBP<120  - EP consult appreciated, no need for ILR and f/u office for event monitoring   - collaterals for the SMA syndrome ( pt follows with Vascular surgery at [facility])   - Refer to our Vascular surgery clinic since pt wants to transfer her care to [facility]     # Post-  pain   - c/w APAP and Ibuprofen  - will limit use of oxycodone     # Constipation  - laxative on Miralax 17gm daily and Bisacodyl 5mg qHS s    # Disposition: per OB. Pt requesting prescription for her labetalol, Flovent MDI, albuterol and fleet enema ( Pt claims Miralax, and Dulcolax don't work for her)     Med consult resident Dr. Busch to d/w OB team .

## 2023-12-19 NOTE — PROGRESS NOTE ADULT - SUBJECTIVE AND OBJECTIVE BOX
Patient evaluated at bedside this morning, resting comfortable in bed. Pt wishes to sleep and did not want to be woken.   Spoke with nursing which reported pt was up all night, endorsing moderate pain controlled with po meds.    Physical Exam:  Vital Signs Last 24 Hrs  T(C): 36.8 (19 Dec 2023 06:42), Max: 36.8 (18 Dec 2023 09:30)  T(F): 98.2 (19 Dec 2023 06:42), Max: 98.2 (18 Dec 2023 09:30)  HR: 77 (19 Dec 2023 06:42) (71 - 85)  BP: 144/92 (19 Dec 2023 06:42) (144/92 - 151/93)  BP(mean): 111 (18 Dec 2023 14:00) (111 - 111)  RR: 18 (19 Dec 2023 06:42) (16 - 18)  SpO2: 100% (19 Dec 2023 06:42) (99% - 100%)    Parameters below as of 19 Dec 2023 06:42  Patient On (Oxygen Delivery Method): room air        Physical exam deferred as pt was sleeping. Pt appears comfortable in bed.

## 2023-12-19 NOTE — PROGRESS NOTE ADULT - SUBJECTIVE AND OBJECTIVE BOX
INTERVAL EVENTS:  -BPs poorly controlled    PAST MEDICAL & SURGICAL HISTORY:  ADHD    Asthma    Bipolar disorder    Manic depression    SMAS (superior mesenteric artery syndrome)    No significant past surgical history    S/P D&C (status post dilation and curettage)        MEDICATIONS  (STANDING):  acetaminophen     Tablet .. 975 milliGRAM(s) Oral <User Schedule>  bisacodyl 5 milliGRAM(s) Oral at bedtime  enoxaparin Injectable 40 milliGRAM(s) SubCutaneous every 24 hours  ibuprofen  Tablet. 600 milliGRAM(s) Oral every 6 hours  labetalol 300 milliGRAM(s) Oral every 8 hours  mometasone 220 MICROgram(s) Inhaler 2 Puff(s) Inhalation daily  oxytocin Infusion 333.333 milliUNIT(s)/Min (1000 mL/Hr) IV Continuous <Continuous>  polyethylene glycol 3350 17 Gram(s) Oral daily    MEDICATIONS  (PRN):  albuterol    90 MICROgram(s) HFA Inhaler 2 Puff(s) Inhalation every 6 hours PRN Shortness of Breath and/or Wheezing  diphenhydrAMINE 25 milliGRAM(s) Oral every 6 hours PRN Pruritus  lanolin Ointment 1 Application(s) Topical every 6 hours PRN Sore Nipples  magnesium hydroxide Suspension 30 milliLiter(s) Oral two times a day PRN Constipation  oxyCODONE    IR 5 milliGRAM(s) Oral every 3 hours PRN Moderate to Severe Pain (4-10)  oxyCODONE    IR 5 milliGRAM(s) Oral once PRN Moderate to Severe Pain (4-10)  senna 1 Tablet(s) Oral daily PRN Constipation  simethicone 80 milliGRAM(s) Chew every 4 hours PRN Gas    T(F): 98.1 (12-19-23 @ 14:00), Max: 98.2 (12-19-23 @ 06:42)  HR: 71 (12-19-23 @ 14:00) (71 - 77)  BP: 138/88 (12-19-23 @ 14:00) (138/88 - 151/93)  BP(mean): --  ABP: --  ABP(mean): --  RR: 18 (12-19-23 @ 14:00) (16 - 18)  SpO2: 100% (12-19-23 @ 14:00) (99% - 100%)    I/O Detail 24H      PHYSICAL EXAM:  GEN: NAD  HEENT: EOMI   RESP: CTA b/l  CV: RRR. Normal S1/S2. No m/r/g.  ABD: soft, non-distended  EXT: No edema   NEURO: alert and attentive    LABS:      Hgb trend: 9.5 <--   WBC trend: 12.65 <--               Serum Cr trend: 0.63 <--         Cardiac Markers           STUDIES:

## 2023-12-20 LAB
ALBUMIN SERPL ELPH-MCNC: 3.2 G/DL — LOW (ref 3.3–5)
ALBUMIN SERPL ELPH-MCNC: 3.2 G/DL — LOW (ref 3.3–5)
ALP SERPL-CCNC: 121 U/L — HIGH (ref 40–120)
ALP SERPL-CCNC: 121 U/L — HIGH (ref 40–120)
ALT FLD-CCNC: 32 U/L — SIGNIFICANT CHANGE UP (ref 10–45)
ALT FLD-CCNC: 32 U/L — SIGNIFICANT CHANGE UP (ref 10–45)
ANION GAP SERPL CALC-SCNC: 9 MMOL/L — SIGNIFICANT CHANGE UP (ref 5–17)
ANION GAP SERPL CALC-SCNC: 9 MMOL/L — SIGNIFICANT CHANGE UP (ref 5–17)
APTT BLD: 30 SEC — SIGNIFICANT CHANGE UP (ref 24.5–35.6)
APTT BLD: 30 SEC — SIGNIFICANT CHANGE UP (ref 24.5–35.6)
AST SERPL-CCNC: 45 U/L — HIGH (ref 10–40)
AST SERPL-CCNC: 45 U/L — HIGH (ref 10–40)
BASE EXCESS BLDV CALC-SCNC: -0.4 MMOL/L — SIGNIFICANT CHANGE UP (ref -2–3)
BASE EXCESS BLDV CALC-SCNC: -0.4 MMOL/L — SIGNIFICANT CHANGE UP (ref -2–3)
BASOPHILS # BLD AUTO: 0.04 K/UL — SIGNIFICANT CHANGE UP (ref 0–0.2)
BASOPHILS # BLD AUTO: 0.04 K/UL — SIGNIFICANT CHANGE UP (ref 0–0.2)
BASOPHILS NFR BLD AUTO: 0.4 % — SIGNIFICANT CHANGE UP (ref 0–2)
BASOPHILS NFR BLD AUTO: 0.4 % — SIGNIFICANT CHANGE UP (ref 0–2)
BILIRUB SERPL-MCNC: 0.4 MG/DL — SIGNIFICANT CHANGE UP (ref 0.2–1.2)
BILIRUB SERPL-MCNC: 0.4 MG/DL — SIGNIFICANT CHANGE UP (ref 0.2–1.2)
BUN SERPL-MCNC: 10 MG/DL — SIGNIFICANT CHANGE UP (ref 7–23)
BUN SERPL-MCNC: 10 MG/DL — SIGNIFICANT CHANGE UP (ref 7–23)
CA-I SERPL-SCNC: 1.26 MMOL/L — SIGNIFICANT CHANGE UP (ref 1.15–1.33)
CA-I SERPL-SCNC: 1.26 MMOL/L — SIGNIFICANT CHANGE UP (ref 1.15–1.33)
CALCIUM SERPL-MCNC: 9 MG/DL — SIGNIFICANT CHANGE UP (ref 8.4–10.5)
CALCIUM SERPL-MCNC: 9 MG/DL — SIGNIFICANT CHANGE UP (ref 8.4–10.5)
CHLORIDE SERPL-SCNC: 105 MMOL/L — SIGNIFICANT CHANGE UP (ref 96–108)
CHLORIDE SERPL-SCNC: 105 MMOL/L — SIGNIFICANT CHANGE UP (ref 96–108)
CO2 BLDV-SCNC: 27.4 MMOL/L — HIGH (ref 22–26)
CO2 BLDV-SCNC: 27.4 MMOL/L — HIGH (ref 22–26)
CO2 SERPL-SCNC: 22 MMOL/L — SIGNIFICANT CHANGE UP (ref 22–31)
CO2 SERPL-SCNC: 22 MMOL/L — SIGNIFICANT CHANGE UP (ref 22–31)
CREAT SERPL-MCNC: 0.64 MG/DL — SIGNIFICANT CHANGE UP (ref 0.5–1.3)
CREAT SERPL-MCNC: 0.64 MG/DL — SIGNIFICANT CHANGE UP (ref 0.5–1.3)
CULTURE RESULTS: SIGNIFICANT CHANGE UP
CULTURE RESULTS: SIGNIFICANT CHANGE UP
EGFR: 126 ML/MIN/1.73M2 — SIGNIFICANT CHANGE UP
EGFR: 126 ML/MIN/1.73M2 — SIGNIFICANT CHANGE UP
EOSINOPHIL # BLD AUTO: 0.04 K/UL — SIGNIFICANT CHANGE UP (ref 0–0.5)
EOSINOPHIL # BLD AUTO: 0.04 K/UL — SIGNIFICANT CHANGE UP (ref 0–0.5)
EOSINOPHIL NFR BLD AUTO: 0.4 % — SIGNIFICANT CHANGE UP (ref 0–6)
EOSINOPHIL NFR BLD AUTO: 0.4 % — SIGNIFICANT CHANGE UP (ref 0–6)
GAS PNL BLDV: 136 MMOL/L — SIGNIFICANT CHANGE UP (ref 136–145)
GAS PNL BLDV: 136 MMOL/L — SIGNIFICANT CHANGE UP (ref 136–145)
GAS PNL BLDV: SIGNIFICANT CHANGE UP
GAS PNL BLDV: SIGNIFICANT CHANGE UP
GLUCOSE SERPL-MCNC: 118 MG/DL — HIGH (ref 70–99)
GLUCOSE SERPL-MCNC: 118 MG/DL — HIGH (ref 70–99)
HCO3 BLDV-SCNC: 26 MMOL/L — SIGNIFICANT CHANGE UP (ref 22–29)
HCO3 BLDV-SCNC: 26 MMOL/L — SIGNIFICANT CHANGE UP (ref 22–29)
HCT VFR BLD CALC: 28.8 % — LOW (ref 34.5–45)
HCT VFR BLD CALC: 28.8 % — LOW (ref 34.5–45)
HGB BLD-MCNC: 9.9 G/DL — LOW (ref 11.5–15.5)
HGB BLD-MCNC: 9.9 G/DL — LOW (ref 11.5–15.5)
IMM GRANULOCYTES NFR BLD AUTO: 0.8 % — SIGNIFICANT CHANGE UP (ref 0–0.9)
IMM GRANULOCYTES NFR BLD AUTO: 0.8 % — SIGNIFICANT CHANGE UP (ref 0–0.9)
LIDOCAIN IGE QN: 18 U/L — SIGNIFICANT CHANGE UP (ref 7–60)
LIDOCAIN IGE QN: 18 U/L — SIGNIFICANT CHANGE UP (ref 7–60)
LYMPHOCYTES # BLD AUTO: 1.07 K/UL — SIGNIFICANT CHANGE UP (ref 1–3.3)
LYMPHOCYTES # BLD AUTO: 1.07 K/UL — SIGNIFICANT CHANGE UP (ref 1–3.3)
LYMPHOCYTES # BLD AUTO: 9.8 % — LOW (ref 13–44)
LYMPHOCYTES # BLD AUTO: 9.8 % — LOW (ref 13–44)
MAGNESIUM SERPL-MCNC: 2.4 MG/DL — SIGNIFICANT CHANGE UP (ref 1.6–2.6)
MAGNESIUM SERPL-MCNC: 2.4 MG/DL — SIGNIFICANT CHANGE UP (ref 1.6–2.6)
MCHC RBC-ENTMCNC: 31 PG — SIGNIFICANT CHANGE UP (ref 27–34)
MCHC RBC-ENTMCNC: 31 PG — SIGNIFICANT CHANGE UP (ref 27–34)
MCHC RBC-ENTMCNC: 34.4 GM/DL — SIGNIFICANT CHANGE UP (ref 32–36)
MCHC RBC-ENTMCNC: 34.4 GM/DL — SIGNIFICANT CHANGE UP (ref 32–36)
MCV RBC AUTO: 90.3 FL — SIGNIFICANT CHANGE UP (ref 80–100)
MCV RBC AUTO: 90.3 FL — SIGNIFICANT CHANGE UP (ref 80–100)
MONOCYTES # BLD AUTO: 0.17 K/UL — SIGNIFICANT CHANGE UP (ref 0–0.9)
MONOCYTES # BLD AUTO: 0.17 K/UL — SIGNIFICANT CHANGE UP (ref 0–0.9)
MONOCYTES NFR BLD AUTO: 1.6 % — LOW (ref 2–14)
MONOCYTES NFR BLD AUTO: 1.6 % — LOW (ref 2–14)
NEUTROPHILS # BLD AUTO: 9.54 K/UL — HIGH (ref 1.8–7.4)
NEUTROPHILS # BLD AUTO: 9.54 K/UL — HIGH (ref 1.8–7.4)
NEUTROPHILS NFR BLD AUTO: 87 % — HIGH (ref 43–77)
NEUTROPHILS NFR BLD AUTO: 87 % — HIGH (ref 43–77)
NRBC # BLD: 0 /100 WBCS — SIGNIFICANT CHANGE UP (ref 0–0)
NRBC # BLD: 0 /100 WBCS — SIGNIFICANT CHANGE UP (ref 0–0)
PCO2 BLDV: 48 MMHG — HIGH (ref 39–42)
PCO2 BLDV: 48 MMHG — HIGH (ref 39–42)
PH BLDV: 7.34 — SIGNIFICANT CHANGE UP (ref 7.32–7.43)
PH BLDV: 7.34 — SIGNIFICANT CHANGE UP (ref 7.32–7.43)
PHOSPHATE SERPL-MCNC: 4.1 MG/DL — SIGNIFICANT CHANGE UP (ref 2.5–4.5)
PHOSPHATE SERPL-MCNC: 4.1 MG/DL — SIGNIFICANT CHANGE UP (ref 2.5–4.5)
PLATELET # BLD AUTO: 292 K/UL — SIGNIFICANT CHANGE UP (ref 150–400)
PLATELET # BLD AUTO: 292 K/UL — SIGNIFICANT CHANGE UP (ref 150–400)
PO2 BLDV: 56 MMHG — HIGH (ref 25–45)
PO2 BLDV: 56 MMHG — HIGH (ref 25–45)
POTASSIUM BLDV-SCNC: 4.3 MMOL/L — SIGNIFICANT CHANGE UP (ref 3.5–5.1)
POTASSIUM BLDV-SCNC: 4.3 MMOL/L — SIGNIFICANT CHANGE UP (ref 3.5–5.1)
POTASSIUM SERPL-MCNC: 4.2 MMOL/L — SIGNIFICANT CHANGE UP (ref 3.5–5.3)
POTASSIUM SERPL-MCNC: 4.2 MMOL/L — SIGNIFICANT CHANGE UP (ref 3.5–5.3)
POTASSIUM SERPL-SCNC: 4.2 MMOL/L — SIGNIFICANT CHANGE UP (ref 3.5–5.3)
POTASSIUM SERPL-SCNC: 4.2 MMOL/L — SIGNIFICANT CHANGE UP (ref 3.5–5.3)
PROT SERPL-MCNC: 6.5 G/DL — SIGNIFICANT CHANGE UP (ref 6–8.3)
PROT SERPL-MCNC: 6.5 G/DL — SIGNIFICANT CHANGE UP (ref 6–8.3)
RAPID RVP RESULT: SIGNIFICANT CHANGE UP
RAPID RVP RESULT: SIGNIFICANT CHANGE UP
RBC # BLD: 3.19 M/UL — LOW (ref 3.8–5.2)
RBC # BLD: 3.19 M/UL — LOW (ref 3.8–5.2)
RBC # FLD: 12.7 % — SIGNIFICANT CHANGE UP (ref 10.3–14.5)
RBC # FLD: 12.7 % — SIGNIFICANT CHANGE UP (ref 10.3–14.5)
SAO2 % BLDV: 79.6 % — SIGNIFICANT CHANGE UP (ref 67–88)
SAO2 % BLDV: 79.6 % — SIGNIFICANT CHANGE UP (ref 67–88)
SARS-COV-2 RNA SPEC QL NAA+PROBE: SIGNIFICANT CHANGE UP
SARS-COV-2 RNA SPEC QL NAA+PROBE: SIGNIFICANT CHANGE UP
SODIUM SERPL-SCNC: 136 MMOL/L — SIGNIFICANT CHANGE UP (ref 135–145)
SODIUM SERPL-SCNC: 136 MMOL/L — SIGNIFICANT CHANGE UP (ref 135–145)
SPECIMEN SOURCE: SIGNIFICANT CHANGE UP
SPECIMEN SOURCE: SIGNIFICANT CHANGE UP
WBC # BLD: 10.95 K/UL — HIGH (ref 3.8–10.5)
WBC # BLD: 10.95 K/UL — HIGH (ref 3.8–10.5)
WBC # FLD AUTO: 10.95 K/UL — HIGH (ref 3.8–10.5)
WBC # FLD AUTO: 10.95 K/UL — HIGH (ref 3.8–10.5)

## 2023-12-20 PROCEDURE — 99233 SBSQ HOSP IP/OBS HIGH 50: CPT

## 2023-12-20 PROCEDURE — 71045 X-RAY EXAM CHEST 1 VIEW: CPT | Mod: 26

## 2023-12-20 PROCEDURE — 99233 SBSQ HOSP IP/OBS HIGH 50: CPT | Mod: GC

## 2023-12-20 PROCEDURE — ZZZZZ: CPT

## 2023-12-20 RX ORDER — LABETALOL HCL 100 MG
2 TABLET ORAL
Qty: 180 | Refills: 0
Start: 2023-12-20 | End: 2024-01-18

## 2023-12-20 RX ORDER — FAMOTIDINE 10 MG/ML
INJECTION INTRAVENOUS
Refills: 0 | Status: DISCONTINUED | OUTPATIENT
Start: 2023-12-20 | End: 2023-12-20

## 2023-12-20 RX ORDER — EPINEPHRINE 0.3 MG/.3ML
0.3 INJECTION INTRAMUSCULAR; SUBCUTANEOUS ONCE
Refills: 0 | Status: DISCONTINUED | OUTPATIENT
Start: 2023-12-20 | End: 2023-12-20

## 2023-12-20 RX ORDER — EPINEPHRINE 0.3 MG/.3ML
0.01 INJECTION INTRAMUSCULAR; SUBCUTANEOUS
Qty: 4 | Refills: 0 | Status: DISCONTINUED | OUTPATIENT
Start: 2023-12-20 | End: 2023-12-20

## 2023-12-20 RX ORDER — METOCLOPRAMIDE HCL 10 MG
10 TABLET ORAL ONCE
Refills: 0 | Status: COMPLETED | OUTPATIENT
Start: 2023-12-20 | End: 2023-12-20

## 2023-12-20 RX ORDER — CHLORHEXIDINE GLUCONATE 213 G/1000ML
1 SOLUTION TOPICAL
Refills: 0 | Status: DISCONTINUED | OUTPATIENT
Start: 2023-12-20 | End: 2023-12-21

## 2023-12-20 RX ORDER — DIPHENHYDRAMINE HCL 50 MG
50 CAPSULE ORAL ONCE
Refills: 0 | Status: DISCONTINUED | OUTPATIENT
Start: 2023-12-20 | End: 2023-12-20

## 2023-12-20 RX ORDER — EPINEPHRINE 11.25MG/ML
0.5 SOLUTION, NON-ORAL INHALATION ONCE
Refills: 0 | Status: DISCONTINUED | OUTPATIENT
Start: 2023-12-20 | End: 2023-12-20

## 2023-12-20 RX ORDER — FAMOTIDINE 10 MG/ML
20 INJECTION INTRAVENOUS ONCE
Refills: 0 | Status: DISCONTINUED | OUTPATIENT
Start: 2023-12-20 | End: 2023-12-20

## 2023-12-20 RX ORDER — LABETALOL HCL 100 MG
400 TABLET ORAL EVERY 8 HOURS
Refills: 0 | Status: DISCONTINUED | OUTPATIENT
Start: 2023-12-20 | End: 2023-12-21

## 2023-12-20 RX ORDER — EPINEPHRINE 0.3 MG/.3ML
0.3 INJECTION INTRAMUSCULAR; SUBCUTANEOUS ONCE
Refills: 0 | Status: DISCONTINUED | OUTPATIENT
Start: 2023-12-20 | End: 2023-12-21

## 2023-12-20 RX ORDER — HYDROCORTISONE 20 MG
100 TABLET ORAL ONCE
Refills: 0 | Status: DISCONTINUED | OUTPATIENT
Start: 2023-12-20 | End: 2023-12-20

## 2023-12-20 RX ADMIN — Medication 400 MILLIGRAM(S): at 11:30

## 2023-12-20 RX ADMIN — Medication 10 MILLIGRAM(S): at 09:02

## 2023-12-20 RX ADMIN — OXYCODONE HYDROCHLORIDE 5 MILLIGRAM(S): 5 TABLET ORAL at 06:46

## 2023-12-20 RX ADMIN — Medication 600 MILLIGRAM(S): at 01:03

## 2023-12-20 RX ADMIN — Medication 40 MILLIGRAM(S): at 21:41

## 2023-12-20 RX ADMIN — OXYCODONE HYDROCHLORIDE 5 MILLIGRAM(S): 5 TABLET ORAL at 02:18

## 2023-12-20 RX ADMIN — Medication 600 MILLIGRAM(S): at 11:34

## 2023-12-20 RX ADMIN — Medication 400 MILLIGRAM(S): at 04:10

## 2023-12-20 RX ADMIN — Medication 975 MILLIGRAM(S): at 15:12

## 2023-12-20 RX ADMIN — Medication 600 MILLIGRAM(S): at 02:02

## 2023-12-20 RX ADMIN — OXYCODONE HYDROCHLORIDE 5 MILLIGRAM(S): 5 TABLET ORAL at 13:32

## 2023-12-20 RX ADMIN — POLYETHYLENE GLYCOL 3350 17 GRAM(S): 17 POWDER, FOR SOLUTION ORAL at 13:35

## 2023-12-20 RX ADMIN — Medication 975 MILLIGRAM(S): at 22:40

## 2023-12-20 RX ADMIN — Medication 5 MILLIGRAM(S): at 21:41

## 2023-12-20 RX ADMIN — ENOXAPARIN SODIUM 40 MILLIGRAM(S): 100 INJECTION SUBCUTANEOUS at 09:02

## 2023-12-20 RX ADMIN — Medication 600 MILLIGRAM(S): at 06:46

## 2023-12-20 RX ADMIN — Medication 600 MILLIGRAM(S): at 07:25

## 2023-12-20 RX ADMIN — Medication 975 MILLIGRAM(S): at 04:10

## 2023-12-20 RX ADMIN — Medication 975 MILLIGRAM(S): at 05:25

## 2023-12-20 RX ADMIN — Medication 975 MILLIGRAM(S): at 21:41

## 2023-12-20 RX ADMIN — Medication 400 MILLIGRAM(S): at 21:41

## 2023-12-20 RX ADMIN — MOMETASONE FUROATE 2 PUFF(S): 220 INHALANT RESPIRATORY (INHALATION) at 09:52

## 2023-12-20 NOTE — PROGRESS NOTE ADULT - ASSESSMENT
A/P: 23y s/p  section, POD#5, stable    #PEC w SF  - Severe range BP requiring 10mg IVP hydralazine 1905  - Denying toxic symptoms    #Hypothermia  - r/o sepsis  - pt has normal temp at this time with normal CXR, Urine Cx; Lactate downtrended from 2.3 to 1.3, BCx pending (NGx4)    #Arrythmia:   -EKG 12/15 : c/f afib with RVR  - TTE  shows patent foramen ovale and small secundum atrial septal defect with EF 67% with mild pulmonary htn  - Cardiology following - plan to appreciate recs ( afib in labor now resolved, likely paroxysmal , no anticoag at this time, replete lytes ,get nonurgent echo. Per cards, nothing to do for PFO and ASD. Patient to f/u with Dr. Skaggs as o/p). On : d/c nifedipine, start labetalol 200mg BID, will need loop recorder prior to d/c     #Hyponatremia:   -started with Na 127, IV NS as of 12/15 and uptrended to 135  - IM consult : trend BMP; no abx ; no evidence SIADH   - Na most recently wnl    #PP Care  -  Pain: PO motrin q6hrs, tylenol q8hrs, oxycodone for severe pain PRN  -  Post-operatively labs: hemodynamically stable, no symptoms of anemia   -  GI: tolerating regular diet, passing gas  -  : s/p woodard , urinating without difficulty  -  DVT prophylaxis: encouraged increased ambulation, SCDs, SQL  -  Dispo: POD 3 or 4

## 2023-12-20 NOTE — PROGRESS NOTE ADULT - SUBJECTIVE AND OBJECTIVE BOX
Patient evaluated at bedside this morning, resting comfortable in bed.   She reports pain is well controlled with oral pain medications.   She denies headache, vision changes, dizziness, chest pain, palpitations, shortness of breath, nausea, vomiting, RUQ/epigastric pain, or heavy vaginal bleeding.  She has been ambulating without assistance, voiding spontaneously, passing gas, tolerating regular diet.     Physical Exam:  Vital Signs Last 24 Hrs  T(C): 36.8 (19 Dec 2023 18:48), Max: 36.8 (19 Dec 2023 06:42)  T(F): 98.3 (19 Dec 2023 18:48), Max: 98.3 (19 Dec 2023 18:48)  HR: 65 (19 Dec 2023 18:48) (65 - 77)  BP: 121/82 (19 Dec 2023 20:10) (121/82 - 161/115)  BP(mean): --  RR: 16 (19 Dec 2023 18:48) (16 - 18)  SpO2: 100% (19 Dec 2023 14:00) (100% - 100%)    Parameters below as of 19 Dec 2023 18:48  Patient On (Oxygen Delivery Method): room air        GA: NAD, A+0 x 3  Pulm: no increased WOB  Abd: soft, nontender, nondistended, no rebound or guarding, incision clean, dry and intact, uterus firm at midline, 2 fb below umbilicus  Extremities: no swelling or calf tenderness

## 2023-12-20 NOTE — PROGRESS NOTE ADULT - ASSESSMENT
23F with SMA syndrome, previous hx of cardiac arrest s/p delivery 2016, asthma (Flovent and PRN albuterol) who presented for  contracts. Pt is now s/p Csection 12/15. Course complicated by PEC with SF, requiring IV magnesium. Post op, pt was noted to be hypothermic to 93F, as well as in Afib. Cardiology consulted for management of Afib and Medicine consulted for management of post-op hypothermia and hyponatremia as well as co-management.    Neuro  CHARLOTTE     CARD   #New onset AFib  #gestational HTN  #SMA syndrome   Cardiology consullted- reccs appreciated  TTE: normal LV function, mild MR, Color flow doppler reveals evidence of a patent foramen ovale Vs a small secundum atrial septal defect  per cardiology, transitioned from Nifedipine to Labetalol 400mg PO TID  no need for AC at this time (CHADsVASc 1)  per EP, no plan for loop recorder at this time as patient is actively breast feeding. Pt will follow up outpatient  pt planning to transition care to [facility] for continued management and follow up    GI    #constipation  pt currently on Oxycodone, likely contributing to constipation  recommend increasing miralax from qD to BID, as well as continued dulcolax and senna    PULM  #asthma  uses flovent and albuterol PRN at home     #hypovolemic hypoNa - RESOLVED  Pt initially presented with sodium of 132, then dropped to Na of 127, now improved to 135  encourage PO intake, will continue to trend BMP    Case discussed and seen with Dr. Matias. Med consult will continue to follow. No medicine contraindications to discharge 23F with SMA syndrome, previous hx of cardiac arrest s/p delivery 2016, asthma (Flovent and PRN albuterol) who presented for  contracts. Pt is now s/p Csection 12/15. Course complicated by PEC with SF, requiring IV magnesium. Post op, pt was noted to be hypothermic to 93F, as well as in Afib. Cardiology consulted for management of Afib and Medicine consulted for management of post-op hypothermia and hyponatremia as well as co-management.    Neuro  CHARLOTTE     CARD   #New onset AFib  #gestational HTN  #SMA syndrome   Cardiology consullted- reccs appreciated  TTE: normal LV function, mild MR, Color flow doppler reveals evidence of a patent foramen ovale Vs a small secundum atrial septal defect  per cardiology, transitioned from Nifedipine to Labetalol 400mg PO TID  no need for AC at this time (CHADsVASc 1)  per EP, no plan for loop recorder at this time as patient is actively breast feeding. Pt will follow up outpatient  pt planning to transition care to [facility] for continued management and follow up    GI    #constipation  pt currently on Oxycodone, likely contributing to constipation  recommend increasing miralax from qD to BID, as well as continued dulcolax and senna    PULM  #asthma  uses flovent and albuterol PRN at home     #hypovolemic hypoNa - RESOLVED  Pt initially presented with sodium of 132, then dropped to Na of 127, now improved to 135  encourage PO intake, will continue to trend BMP    Case discussed and seen with Dr. Matias. Med consult will continue to follow. No medicine contraindications to discharge 23F with SMA syndrome, previous hx of cardiac arrest s/p delivery 2016, asthma (Flovent and PRN albuterol) who presented for  contracts. Pt is now s/p Csection 12/15. Course complicated by PEC with SF, requiring IV magnesium. Post op, pt was noted to be hypothermic to 93F, as well as in Afib. Cardiology consulted for management of Afib and Medicine consulted for management of post-op hypothermia and hyponatremia as well as co-management.    Neuro  CHARLOTTE     CARD   #New onset AFib  #gestational HTN  #SMA syndrome   Cardiology consullted- reccs appreciated  TTE: normal LV function, mild MR, Color flow doppler reveals evidence of a patent foramen ovale Vs a small secundum atrial septal defect  per cardiology, transitioned from Nifedipine to Labetalol 400mg PO TID  no need for AC at this time (CHADsVASc 1)  per EP, no plan for loop recorder at this time as patient is actively breast feeding. Pt will follow up outpatient  pt planning to transition care to [facility] for continued management and follow up    GI    #constipation  pt currently on Oxycodone, likely contributing to constipation  recommend increasing miralax from qD to BID, as well as continued dulcolax and senna    PULM  #asthma  uses flovent and albuterol PRN at home     #hypovolemic hypoNa - RESOLVED  Pt initially presented with sodium of 132, then dropped to Na of 127, now improved to 135  encourage PO intake, will continue to trend BMP    RENAL   CHARLOTTE     ID  CHARLOTTE     Prophylaxis    DVT Lovenox 40 q24  DIET NPO  FLUIDS none  CODE full  DISPO ICU    23F with SMA syndrome, previous hx of cardiac arrest s/p delivery 2016, asthma (Flovent and PRN albuterol) who presented for  contracts. Pt is now s/p Csection 12/15. Course complicated by PEC with SF, requiring IV magnesium. Post op, pt was noted to be hypothermic to 93F, as well as in Afib. Cardiology consulted for management of Afib and Medicine consulted for management of post-op hypothermia and hyponatremia as well as co-management.    Neuro  CHARLOTTE     CARD   #New onset AFib  #gestational HTN  #SMA syndrome   Cardiology consullted- reccs appreciated  TTE: normal LV function, mild MR, Color flow doppler reveals evidence of a patent foramen ovale Vs a small secundum atrial septal defect  per cardiology, transitioned from Nifedipine to Labetalol 400mg PO TID  no need for AC at this time (CHADsVASc 1)  per EP, no plan for loop recorder at this time as patient is actively breast feeding. Pt will follow up outpatient  pt planning to transition care to [facility] for continued management and follow up    GI    #constipation  pt currently on Oxycodone, likely contributing to constipation  recommend increasing miralax from qD to BID, as well as continued dulcolax and senna    PULM  #asthma  uses flovent and albuterol PRN at home     #hypovolemic hypoNa - RESOLVED  Pt initially presented with sodium of 132, then dropped to Na of 127, now improved to 135  encourage PO intake, will continue to trend BMP    RENAL   CHARLOTTE     ID  CHARLOTTE     Prophylaxis    DVT Lovenox 40 q24  DIET NPO  FLUIDS none  CODE full  DISPO ICU

## 2023-12-20 NOTE — CHART NOTE - NSCHARTNOTEFT_GEN_A_CORE
***Rapid Response Critical Care Nurse Practitioner Note***    Patient is a 23y old  Female admitted for HPI:  22 yo  at 34w0d presents with  contractions. Per patient, she left AMA from [facility] earlier this evening after presenting with  contractions. Per chart review patient has gHTN, not yet meeting criteria for PEC w/ SF. Patient is complaining of contractions [id number] and rectal pressure. Upon arrive BP is 176/114. She denies leakage of fluid, vaginal bleeding. Endorses fetal movement. Patient denies headache, blurry vision, shortness of breath, RUQ pain. Patient is requesting epidural for contractions. (15 Dec 2023 01:05)    Rapid response team called @ 1623 for shortness of breath and difficulty breathing after a family member was eating a food product with shrimp(patient with known allergy to shellfish)     Patient was seen and examined at the bedside by the rapid response team. Upon arrival of RRT OB team, RNs @ bedside patient is alert, following commands c/o shortness of breath and difficulty breathing. T: Afebrile HR 90s BP 180s/100s RR 20s SPO2 100% on NRB. Anesthesia called given concern for poor air entry and need for possible intubation. Epi 0.3mg IM given x 1, hydrocortisone 100mg IV, NS 1L bolus, benadryl IV, Mg 2g IVPB, epi infusion started, duoneb x 3, hydral 10mg IVP, 2nd PIV established and VBG drawn. Patient initially noted to have facial swelling, diminished breath sounds in all lung fields and intercostal retractions. S/p above interventions patient noted to have diffuse wheezing but improved aeration per discussed with critical care fellow/attending and OBGYN team patient transferred to MICU for ongoing management of anaphalyxis/asthma exacerbation.     Allergies  shellfish (Anaphylaxis)  No Known Drug Allergies  latex (Rash)    PAST MEDICAL & SURGICAL HISTORY:  ADHD  Asthma  Bipolar disorder  Manic depression  SMAS (superior mesenteric artery syndrome)  S/P D&C (status post dilation and curettage)    REVIEW OF SYSTEMS:   General: +fatigue. No fever/chills  HEENT: +congestion and facial swelling.   Resp: +cough, +SOB, +wheezing and subjective difficulty breathing  CV: +chest pain  ABD: +lower abd pain @ incision site  : +urgency and reports having to urinate  Skin: Pain @ incision site. Denies hives, rash or pruritus   MSK: Denies joint pain or swelling  Neuro: Denies dizziness or LOC    Vital Signs Last 24 Hrs  T(C): 36.7 (20 Dec 2023 14:00), Max: 36.9 (20 Dec 2023 06:00)  T(F): 98 (20 Dec 2023 14:00), Max: 98.4 (20 Dec 2023 06:00)  HR: 70 (20 Dec 2023 14:00) (65 - 98)  BP: 146/90 (20 Dec 2023 14:00) (101/55 - 161/115)  BP(mean): 109 (20 Dec 2023 14:00) (92 - 109)  RR: 16 (20 Dec 2023 14:00) (16 - 18)  SpO2: 100% (20 Dec 2023 14:00) (97% - 100%)    Parameters below as of 20 Dec 2023 14:00  Patient On (Oxygen Delivery Method): room air    Physical Exam  GENERAL: Alert female in hospital bed in acute distress  HEENT: +mild periorbital swelling. AC/NT, supple, no JVD, no stridor, uvula midline no obvious oropharyngeal swelling externally.   LUNGS: diminished through out, w intercostal retractions and abdominal muscle use  HEART: RRR +S1/S2 no R/G/M  ABDOMEN: +abdominal binder covering surgical incision. Abd rounded soft/nd/nt  EXTREMITIES: WWP   SKIN: No new rashes or lesions.  MSK: strength equal BL w normal reflexes  VASCULAR: +2 radial and DP pulses CRT< 2 sec   NEUROLOGIC: Grossly intact.  INCISIONS: s/p  incision overed  Lines: PIV x 2     EKG Tracing: NSR    MEDICATIONS  (STANDING):  acetaminophen     Tablet .. 975 milliGRAM(s) Oral <User Schedule>  bisacodyl 5 milliGRAM(s) Oral at bedtime  diphenhydrAMINE 50 milliGRAM(s) Oral once  enoxaparin Injectable 40 milliGRAM(s) SubCutaneous every 24 hours  EPINEPHrine     1 mG/mL Injectable 0.3 milliGRAM(s) IntraMuscular once  EPINEPHrine    Infusion 0.01 MICROgram(s)/kG/Min (2.56 mL/Hr) IV Continuous <Continuous>  hydrocortisone sodium succinate Injectable 100 milliGRAM(s) IV Push once  ibuprofen  Tablet. 600 milliGRAM(s) Oral every 6 hours  labetalol 400 milliGRAM(s) Oral every 8 hours  mometasone 220 MICROgram(s) Inhaler 2 Puff(s) Inhalation daily  oxytocin Infusion 333.333 milliUNIT(s)/Min (1000 mL/Hr) IV Continuous <Continuous>  polyethylene glycol 3350 17 Gram(s) Oral daily  racepinephrine  2.25% Inhalation 0.5 milliLiter(s) Inhalation once    MEDICATIONS  (PRN):  albuterol    90 MICROgram(s) HFA Inhaler 2 Puff(s) Inhalation every 6 hours PRN Shortness of Breath and/or Wheezing  diphenhydrAMINE 25 milliGRAM(s) Oral every 6 hours PRN Pruritus  lanolin Ointment 1 Application(s) Topical every 6 hours PRN Sore Nipples  magnesium hydroxide Suspension 30 milliLiter(s) Oral two times a day PRN Constipation  oxyCODONE    IR 5 milliGRAM(s) Oral once PRN Moderate to Severe Pain (4-10)  oxyCODONE    IR 5 milliGRAM(s) Oral every 3 hours PRN Moderate to Severe Pain (4-10)  senna 1 Tablet(s) Oral daily PRN Constipation  simethicone 80 milliGRAM(s) Chew every 4 hours PRN Gas      ASSESSMENT & PLAN  Rapid Response called for 23y year old Female with a past medical history of , post partum day 5 via , preeclampsia, asthma, gestational DM, bipolar DO, pAF(during delivery course) and ECHO 2 elevated PA pressures s/p rapid response 2/2 exposure to shrimp w c/f anaphylaxis s/p epi IM, infusion, benadryl, solucortef and duoneb with improvement of symptoms transferred to MICU for on going management of anaphylaxis also w possible asthma exacerbation and preeclampsia improved after intervention.     Plan-  -Admit to MICU for monitoring  -Duoneb q4h   -c/w steroid would consider dexamethasone 10mg x 1   -s/p Mg 2g   -Goal BP <160/110   -s/p hydralizine 10mg IVP x 1  -PRN hydralizine 10mg q20min x 3  -Low threshold to start Cardene infusion  -OBGYN following and recs appreciated per discussion can be reached for any questions for BP control okay w cardene gtt if needed  -c/w labetolol 400mg TID (home dose)  -Consider Pepcid 20mg   -neuro checks q3h  -finger sticks AC/HS while on steroid  -Seizure precautions   -Sent CBC/CMP/Lipase/Coags/ABG/Mg/Phos   -Send resp viral panel  -Consider CXR r/o PNA  -Peak flow pre/post duoneb  -Advance diet as tolerated   -Lactation consultation  ***Rescue strategies if patient's respiratory status is to deteriorate Epi 0.3mg IM x 3, restart infusion, consider ketamine 0.8mg/kg IVP x 1, BiPAP w low threshold for intubation***     F: s/p 1liter bolus consider D5LR if remains NPO  E: K>4 Mg>2   D: Advance as tolerated NPO if BiPAP   GI PPx: Pepcid 20mg daily while on steroid  DVT PPx: SCDs  Lines: PIVx 2  Code Status: Full  Dispo: MICU     I have personally and independently provided 31 minutes of critical care services.  This excludes any time spent on separate procedures or teaching.   The plan of care was discussed with Dr. Butler, house staff team and Dr. Aman Sanderson [facility]  [phone]  Lauro "clinical impact" ***Rapid Response Critical Care Nurse Practitioner Note***    Patient is a 23y old  Female admitted for HPI:  22 yo  at 34w0d presents with  contractions. Per patient, she left AMA from [facility] [place] earlier this evening after presenting with  contractions. Per chart review patient has gHTN, not yet meeting criteria for PEC w/ SF. Patient is complaining of contractions [id number] and rectal pressure. Upon arrive BP is 176/114. She denies leakage of fluid, vaginal bleeding. Endorses fetal movement. Patient denies headache, blurry vision, shortness of breath, RUQ pain. Patient is requesting epidural for contractions. (15 Dec 2023 01:05)    Rapid response team called @ 1623 for shortness of breath and difficulty breathing after a family member was eating a food product with shrimp(patient with known allergy to shellfish)     Patient was seen and examined at the bedside by the rapid response team. Upon arrival of RRT OB team, RNs @ bedside patient is alert, following commands c/o shortness of breath and difficulty breathing. T: Afebrile HR 90s BP 180s/100s RR 20s SPO2 100% on NRB. Anesthesia called given concern for poor air entry and need for possible intubation. Epi 0.3mg IM given x 1, hydrocortisone 100mg IV, NS 1L bolus, benadryl IV, Mg 2g IVPB, epi infusion started, duoneb x 3, hydral 10mg IVP, 2nd PIV established and VBG drawn. Patient initially noted to have facial swelling, diminished breath sounds in all lung fields and intercostal retractions. S/p above interventions patient noted to have diffuse wheezing but improved aeration per discussed with critical care fellow/attending and OBGYN team patient transferred to MICU for ongoing management of anaphalyxis/asthma exacerbation.     Allergies  shellfish (Anaphylaxis)  No Known Drug Allergies  latex (Rash)    PAST MEDICAL & SURGICAL HISTORY:  ADHD  Asthma  Bipolar disorder  Manic depression  SMAS (superior mesenteric artery syndrome)  S/P D&C (status post dilation and curettage)    REVIEW OF SYSTEMS:   General: +fatigue. No fever/chills  HEENT: +congestion and facial swelling.   Resp: +cough, +SOB, +wheezing and subjective difficulty breathing  CV: +chest pain  ABD: +lower abd pain @ incision site  : +urgency and reports having to urinate  Skin: Pain @ incision site. Denies hives, rash or pruritus   MSK: Denies joint pain or swelling  Neuro: Denies dizziness or LOC    Vital Signs Last 24 Hrs  T(C): 36.7 (20 Dec 2023 14:00), Max: 36.9 (20 Dec 2023 06:00)  T(F): 98 (20 Dec 2023 14:00), Max: 98.4 (20 Dec 2023 06:00)  HR: 70 (20 Dec 2023 14:00) (65 - 98)  BP: 146/90 (20 Dec 2023 14:00) (101/55 - 161/115)  BP(mean): 109 (20 Dec 2023 14:00) (92 - 109)  RR: 16 (20 Dec 2023 14:00) (16 - 18)  SpO2: 100% (20 Dec 2023 14:00) (97% - 100%)    Parameters below as of 20 Dec 2023 14:00  Patient On (Oxygen Delivery Method): room air    Physical Exam  GENERAL: Alert female in hospital bed in acute distress  HEENT: +mild periorbital swelling. AC/NT, supple, no JVD, no stridor, uvula midline no obvious oropharyngeal swelling externally.   LUNGS: diminished through out, w intercostal retractions and abdominal muscle use  HEART: RRR +S1/S2 no R/G/M  ABDOMEN: +abdominal binder covering surgical incision. Abd rounded soft/nd/nt  EXTREMITIES: WWP   SKIN: No new rashes or lesions.  MSK: strength equal BL w normal reflexes  VASCULAR: +2 radial and DP pulses CRT< 2 sec   NEUROLOGIC: Grossly intact.  INCISIONS: s/p  incision overed  Lines: PIV x 2     EKG Tracing: NSR    MEDICATIONS  (STANDING):  acetaminophen     Tablet .. 975 milliGRAM(s) Oral <User Schedule>  bisacodyl 5 milliGRAM(s) Oral at bedtime  diphenhydrAMINE 50 milliGRAM(s) Oral once  enoxaparin Injectable 40 milliGRAM(s) SubCutaneous every 24 hours  EPINEPHrine     1 mG/mL Injectable 0.3 milliGRAM(s) IntraMuscular once  EPINEPHrine    Infusion 0.01 MICROgram(s)/kG/Min (2.56 mL/Hr) IV Continuous <Continuous>  hydrocortisone sodium succinate Injectable 100 milliGRAM(s) IV Push once  ibuprofen  Tablet. 600 milliGRAM(s) Oral every 6 hours  labetalol 400 milliGRAM(s) Oral every 8 hours  mometasone 220 MICROgram(s) Inhaler 2 Puff(s) Inhalation daily  oxytocin Infusion 333.333 milliUNIT(s)/Min (1000 mL/Hr) IV Continuous <Continuous>  polyethylene glycol 3350 17 Gram(s) Oral daily  racepinephrine  2.25% Inhalation 0.5 milliLiter(s) Inhalation once    MEDICATIONS  (PRN):  albuterol    90 MICROgram(s) HFA Inhaler 2 Puff(s) Inhalation every 6 hours PRN Shortness of Breath and/or Wheezing  diphenhydrAMINE 25 milliGRAM(s) Oral every 6 hours PRN Pruritus  lanolin Ointment 1 Application(s) Topical every 6 hours PRN Sore Nipples  magnesium hydroxide Suspension 30 milliLiter(s) Oral two times a day PRN Constipation  oxyCODONE    IR 5 milliGRAM(s) Oral once PRN Moderate to Severe Pain (4-10)  oxyCODONE    IR 5 milliGRAM(s) Oral every 3 hours PRN Moderate to Severe Pain (4-10)  senna 1 Tablet(s) Oral daily PRN Constipation  simethicone 80 milliGRAM(s) Chew every 4 hours PRN Gas      ASSESSMENT & PLAN  Rapid Response called for 23y year old Female with a past medical history of , post partum day 5 via , preeclampsia, asthma, gestational DM, bipolar DO, pAF(during delivery course) and ECHO 2 elevated PA pressures s/p rapid response 2/2 exposure to shrimp w c/f anaphylaxis s/p epi IM, infusion, benadryl, solucortef and duoneb with improvement of symptoms transferred to MICU for on going management of anaphylaxis also w possible asthma exacerbation and preeclampsia improved after intervention.     Plan-  -Admit to MICU for monitoring  -Duoneb q4h   -c/w steroid would consider dexamethasone 10mg x 1   -s/p Mg 2g   -Goal BP <160/110   -s/p hydralizine 10mg IVP x 1  -PRN hydralizine 10mg q20min x 3  -Low threshold to start Cardene infusion  -OBGYN following and recs appreciated per discussion can be reached for any questions for BP control okay w cardene gtt if needed  -c/w labetolol 400mg TID (home dose)  -Consider Pepcid 20mg   -neuro checks q3h  -finger sticks AC/HS while on steroid  -Seizure precautions   -Sent CBC/CMP/Lipase/Coags/ABG/Mg/Phos   -Send resp viral panel  -Consider CXR r/o PNA  -Peak flow pre/post duoneb  -Advance diet as tolerated   -Lactation consultation  ***Rescue strategies if patient's respiratory status is to deteriorate Epi 0.3mg IM x 3, restart infusion, consider ketamine 0.8mg/kg IVP x 1, BiPAP w low threshold for intubation***     F: s/p 1liter bolus consider D5LR if remains NPO  E: K>4 Mg>2   D: Advance as tolerated NPO if BiPAP   GI PPx: Pepcid 20mg daily while on steroid  DVT PPx: SCDs  Lines: PIVx 2  Code Status: Full  Dispo: MICU     I have personally and independently provided 31 minutes of critical care services.  This excludes any time spent on separate procedures or teaching.   The plan of care was discussed with Dr. Butler, house staff team and Dr. Aman Sanderson [facility]  [phone]  Lauro "clinical impact" ***Rapid Response Critical Care Nurse Practitioner Note***    Patient is a 23y old  Female admitted for HPI:  24 yo  at 34w0d presents with  contractions. Per patient, she left AMA from [facility] earlier this evening after presenting with  contractions. Per chart review patient has gHTN, not yet meeting criteria for PEC w/ SF. Patient is complaining of contractions [id number] and rectal pressure. Upon arrive BP is 176/114. She denies leakage of fluid, vaginal bleeding. Endorses fetal movement. Patient denies headache, blurry vision, shortness of breath, RUQ pain. Patient is requesting epidural for contractions. (15 Dec 2023 01:05)    Rapid response team called @ 1623 for shortness of breath and difficulty breathing after a family member was eating a food product with shrimp(patient with known allergy to shellfish)     Patient was seen and examined at the bedside by the rapid response team. Upon arrival of RRT OB team, RNs @ bedside patient is alert, following commands c/o shortness of breath and difficulty breathing. T: Afebrile HR 90s BP 180s/100s RR 20s SPO2 100% on NRB. Anesthesia called given concern for poor air entry and need for possible intubation. Epi 0.3mg IM given x 1, hydrocortisone 100mg IV, NS 1L bolus, benadryl IV, Mg 2g IVPB, epi infusion started, duoneb x 3, hydral 10mg IVP, 2nd PIV established and VBG drawn. Patient initially noted to have facial swelling, diminished breath sounds in all lung fields and intercostal retractions. S/p above interventions patient noted to have diffuse wheezing but improved aeration per discussed with critical care fellow/attending and OBGYN team patient transferred to MICU for ongoing management of anaphalyxis/asthma exacerbation.     Allergies  shellfish (Anaphylaxis)  No Known Drug Allergies  latex (Rash)    PAST MEDICAL & SURGICAL HISTORY:  ADHD  Asthma  Bipolar disorder  Manic depression  SMAS (superior mesenteric artery syndrome)  S/P D&C (status post dilation and curettage)    REVIEW OF SYSTEMS:   General: +fatigue. No fever/chills  HEENT: +congestion and facial swelling.   Resp: +cough, +SOB, +wheezing and subjective difficulty breathing  CV: +chest pain  ABD: +lower abd pain @ incision site  : +urgency and reports having to urinate  Skin: Pain @ incision site. Denies hives, rash or pruritus   MSK: Denies joint pain or swelling  Neuro: Denies dizziness or LOC    Vital Signs Last 24 Hrs  T(C): 36.7 (20 Dec 2023 14:00), Max: 36.9 (20 Dec 2023 06:00)  T(F): 98 (20 Dec 2023 14:00), Max: 98.4 (20 Dec 2023 06:00)  HR: 70 (20 Dec 2023 14:00) (65 - 98)  BP: 146/90 (20 Dec 2023 14:00) (101/55 - 161/115)  BP(mean): 109 (20 Dec 2023 14:00) (92 - 109)  RR: 16 (20 Dec 2023 14:00) (16 - 18)  SpO2: 100% (20 Dec 2023 14:00) (97% - 100%)    Parameters below as of 20 Dec 2023 14:00  Patient On (Oxygen Delivery Method): room air    Physical Exam  GENERAL: Alert female in hospital bed in acute distress  HEENT: +mild periorbital swelling. AC/NT, supple, no JVD, no stridor, uvula midline no obvious oropharyngeal swelling externally.   LUNGS: diminished through out, w intercostal retractions and abdominal muscle use  HEART: RRR +S1/S2 no R/G/M  ABDOMEN: +abdominal binder covering surgical incision. Abd rounded soft/nd/nt  EXTREMITIES: WWP   SKIN: No new rashes or lesions.  MSK: strength equal BL w normal reflexes  VASCULAR: +2 radial and DP pulses CRT< 2 sec   NEUROLOGIC: Grossly intact.  INCISIONS: s/p  incision overed  Lines: PIV x 2     EKG Tracing: NSR    MEDICATIONS  (STANDING):  acetaminophen     Tablet .. 975 milliGRAM(s) Oral <User Schedule>  bisacodyl 5 milliGRAM(s) Oral at bedtime  diphenhydrAMINE 50 milliGRAM(s) Oral once  enoxaparin Injectable 40 milliGRAM(s) SubCutaneous every 24 hours  EPINEPHrine     1 mG/mL Injectable 0.3 milliGRAM(s) IntraMuscular once  EPINEPHrine    Infusion 0.01 MICROgram(s)/kG/Min (2.56 mL/Hr) IV Continuous <Continuous>  hydrocortisone sodium succinate Injectable 100 milliGRAM(s) IV Push once  ibuprofen  Tablet. 600 milliGRAM(s) Oral every 6 hours  labetalol 400 milliGRAM(s) Oral every 8 hours  mometasone 220 MICROgram(s) Inhaler 2 Puff(s) Inhalation daily  oxytocin Infusion 333.333 milliUNIT(s)/Min (1000 mL/Hr) IV Continuous <Continuous>  polyethylene glycol 3350 17 Gram(s) Oral daily  racepinephrine  2.25% Inhalation 0.5 milliLiter(s) Inhalation once    MEDICATIONS  (PRN):  albuterol    90 MICROgram(s) HFA Inhaler 2 Puff(s) Inhalation every 6 hours PRN Shortness of Breath and/or Wheezing  diphenhydrAMINE 25 milliGRAM(s) Oral every 6 hours PRN Pruritus  lanolin Ointment 1 Application(s) Topical every 6 hours PRN Sore Nipples  magnesium hydroxide Suspension 30 milliLiter(s) Oral two times a day PRN Constipation  oxyCODONE    IR 5 milliGRAM(s) Oral once PRN Moderate to Severe Pain (4-10)  oxyCODONE    IR 5 milliGRAM(s) Oral every 3 hours PRN Moderate to Severe Pain (4-10)  senna 1 Tablet(s) Oral daily PRN Constipation  simethicone 80 milliGRAM(s) Chew every 4 hours PRN Gas      ASSESSMENT & PLAN  Rapid Response called for 23y year old Female with a past medical history of , post partum day 5 via , preeclampsia, asthma, gestational DM, bipolar DO, pAF(during delivery course) and ECHO 2 elevated PA pressures s/p rapid response 2/2 exposure to shrimp w c/f anaphylaxis s/p epi IM, infusion, benadryl, solucortef and duoneb with improvement of symptoms transferred to MICU for on going management of anaphylaxis also w possible asthma exacerbation and preeclampsia improved after intervention.     Plan-  -Admit to MICU for monitoring  -Duoneb q4h   -c/w steroid would consider dexamethasone 10mg x 1   -s/p Mg 2g   -Goal BP <160/110   -s/p hydralizine 10mg IVP x 1  -PRN hydralizine 10mg q20min x 3  -Low threshold to start Cardene infusion  -OBGYN following and recs appreciated per discussion can be reached for any questions for BP control okay w cardene gtt if needed  -c/w labetolol 400mg TID (home dose)  -Consider Pepcid 20mg   -neuro checks q3h  -finger sticks AC/HS while on steroid  -Seizure precautions   -Sent CBC/CMP/Lipase/Coags/ABG/Mg/Phos   -Send resp viral panel  -Consider CXR r/o PNA  -Peak flow pre/post duoneb  -Advance diet as tolerated   -Lactation consultation  ***Rescue strategies if patient's respiratory status is to deteriorate Epi 0.3mg IM x 3, restart infusion, consider ketamine 0.8mg/kg IVP x 1, BiPAP w low threshold for intubation. If hypertensive w preeclamptic features call OB STAT given Mg 4g***     F: s/p 1liter bolus consider D5LR if remains NPO  E: K>4 Mg>2   D: Advance as tolerated NPO if BiPAP   GI PPx: Pepcid 20mg daily while on steroid  DVT PPx: SCDs  Lines: PIVx 2  Code Status: Full  Dispo: MICU     I have personally and independently provided 31 minutes of critical care services.  This excludes any time spent on separate procedures or teaching.   The plan of care was discussed with Dr. Butler, house staff team and Dr. Aman Sanderson [facility]  [phone]  Lauro "clinical impact" ***Rapid Response Critical Care Nurse Practitioner Note***    Patient is a 23y old  Female admitted for HPI:  24 yo  at 34w0d presents with  contractions. Per patient, she left AMA from [facility] earlier this evening after presenting with  contractions. Per chart review patient has gHTN, not yet meeting criteria for PEC w/ SF. Patient is complaining of contractions [id number] and rectal pressure. Upon arrive BP is 176/114. She denies leakage of fluid, vaginal bleeding. Endorses fetal movement. Patient denies headache, blurry vision, shortness of breath, RUQ pain. Patient is requesting epidural for contractions. (15 Dec 2023 01:05)    Rapid response team called @ 1623 for shortness of breath and difficulty breathing after a family member was eating a food product with shrimp(patient with known allergy to shellfish)     Patient was seen and examined at the bedside by the rapid response team. Upon arrival of RRT OB team, RNs @ bedside patient is alert, following commands c/o shortness of breath and difficulty breathing. T: Afebrile HR 90s BP 180s/100s RR 20s SPO2 100% on NRB. Anesthesia called given concern for poor air entry and need for possible intubation. Epi 0.3mg IM given x 1, hydrocortisone 100mg IV, NS 1L bolus, benadryl IV, Mg 2g IVPB, epi infusion started, duoneb x 3, hydral 10mg IVP, 2nd PIV established and VBG drawn. Patient initially noted to have facial swelling, diminished breath sounds in all lung fields and intercostal retractions. S/p above interventions patient noted to have diffuse wheezing but improved aeration per discussed with critical care fellow/attending and OBGYN team patient transferred to MICU for ongoing management of anaphalyxis/asthma exacerbation.     Allergies  shellfish (Anaphylaxis)  No Known Drug Allergies  latex (Rash)    PAST MEDICAL & SURGICAL HISTORY:  ADHD  Asthma  Bipolar disorder  Manic depression  SMAS (superior mesenteric artery syndrome)  S/P D&C (status post dilation and curettage)    REVIEW OF SYSTEMS:   General: +fatigue. No fever/chills  HEENT: +congestion and facial swelling.   Resp: +cough, +SOB, +wheezing and subjective difficulty breathing  CV: +chest pain  ABD: +lower abd pain @ incision site  : +urgency and reports having to urinate  Skin: Pain @ incision site. Denies hives, rash or pruritus   MSK: Denies joint pain or swelling  Neuro: Denies dizziness or LOC    Vital Signs Last 24 Hrs  T(C): 36.7 (20 Dec 2023 14:00), Max: 36.9 (20 Dec 2023 06:00)  T(F): 98 (20 Dec 2023 14:00), Max: 98.4 (20 Dec 2023 06:00)  HR: 70 (20 Dec 2023 14:00) (65 - 98)  BP: 146/90 (20 Dec 2023 14:00) (101/55 - 161/115)  BP(mean): 109 (20 Dec 2023 14:00) (92 - 109)  RR: 16 (20 Dec 2023 14:00) (16 - 18)  SpO2: 100% (20 Dec 2023 14:00) (97% - 100%)    Parameters below as of 20 Dec 2023 14:00  Patient On (Oxygen Delivery Method): room air    Physical Exam  GENERAL: Alert female in hospital bed in acute distress  HEENT: +mild periorbital swelling. AC/NT, supple, no JVD, no stridor, uvula midline no obvious oropharyngeal swelling externally.   LUNGS: diminished through out, w intercostal retractions and abdominal muscle use  HEART: RRR +S1/S2 no R/G/M  ABDOMEN: +abdominal binder covering surgical incision. Abd rounded soft/nd/nt  EXTREMITIES: WWP   SKIN: No new rashes or lesions.  MSK: strength equal BL w normal reflexes  VASCULAR: +2 radial and DP pulses CRT< 2 sec   NEUROLOGIC: Grossly intact.  INCISIONS: s/p  incision overed  Lines: PIV x 2     EKG Tracing: NSR    MEDICATIONS  (STANDING):  acetaminophen     Tablet .. 975 milliGRAM(s) Oral <User Schedule>  bisacodyl 5 milliGRAM(s) Oral at bedtime  diphenhydrAMINE 50 milliGRAM(s) Oral once  enoxaparin Injectable 40 milliGRAM(s) SubCutaneous every 24 hours  EPINEPHrine     1 mG/mL Injectable 0.3 milliGRAM(s) IntraMuscular once  EPINEPHrine    Infusion 0.01 MICROgram(s)/kG/Min (2.56 mL/Hr) IV Continuous <Continuous>  hydrocortisone sodium succinate Injectable 100 milliGRAM(s) IV Push once  ibuprofen  Tablet. 600 milliGRAM(s) Oral every 6 hours  labetalol 400 milliGRAM(s) Oral every 8 hours  mometasone 220 MICROgram(s) Inhaler 2 Puff(s) Inhalation daily  oxytocin Infusion 333.333 milliUNIT(s)/Min (1000 mL/Hr) IV Continuous <Continuous>  polyethylene glycol 3350 17 Gram(s) Oral daily  racepinephrine  2.25% Inhalation 0.5 milliLiter(s) Inhalation once    MEDICATIONS  (PRN):  albuterol    90 MICROgram(s) HFA Inhaler 2 Puff(s) Inhalation every 6 hours PRN Shortness of Breath and/or Wheezing  diphenhydrAMINE 25 milliGRAM(s) Oral every 6 hours PRN Pruritus  lanolin Ointment 1 Application(s) Topical every 6 hours PRN Sore Nipples  magnesium hydroxide Suspension 30 milliLiter(s) Oral two times a day PRN Constipation  oxyCODONE    IR 5 milliGRAM(s) Oral once PRN Moderate to Severe Pain (4-10)  oxyCODONE    IR 5 milliGRAM(s) Oral every 3 hours PRN Moderate to Severe Pain (4-10)  senna 1 Tablet(s) Oral daily PRN Constipation  simethicone 80 milliGRAM(s) Chew every 4 hours PRN Gas      ASSESSMENT & PLAN  Rapid Response called for 23y year old Female with a past medical history of , post partum day 5 via , preeclampsia, asthma, gestational DM, bipolar DO, pAF(during delivery course) and ECHO 2 elevated PA pressures s/p rapid response 2/2 exposure to shrimp w c/f anaphylaxis s/p epi IM, infusion, benadryl, solucortef and duoneb with improvement of symptoms transferred to MICU for on going management of anaphylaxis also w possible asthma exacerbation and preeclampsia improved after intervention.     Plan-  -Admit to MICU for monitoring  -Duoneb q4h   -c/w steroid would consider dexamethasone 10mg x 1   -s/p Mg 2g   -Goal BP <160/110   -s/p hydralizine 10mg IVP x 1  -PRN hydralizine 10mg q20min x 3  -Low threshold to start Cardene infusion  -OBGYN following and recs appreciated per discussion can be reached for any questions for BP control okay w cardene gtt if needed  -c/w labetolol 400mg TID (home dose)  -Consider Pepcid 20mg   -neuro checks q3h  -finger sticks AC/HS while on steroid  -Seizure precautions   -Sent CBC/CMP/Lipase/Coags/ABG/Mg/Phos   -Send resp viral panel  -Consider CXR r/o PNA  -Peak flow pre/post duoneb  -Advance diet as tolerated   -Lactation consultation  ***Rescue strategies if patient's respiratory status is to deteriorate Epi 0.3mg IM x 3, restart infusion, consider ketamine 0.8mg/kg IVP x 1, BiPAP w low threshold for intubation. If hypertensive w preeclamptic features call OB STAT given Mg 4g***     F: s/p 1liter bolus consider D5LR if remains NPO  E: K>4 Mg>2   D: Advance as tolerated NPO if BiPAP   GI PPx: Pepcid 20mg daily while on steroid  DVT PPx: SCDs  Lines: PIVx 2  Code Status: Full  Dispo: MICU     I have personally and independently provided 31 minutes of critical care services.  This excludes any time spent on separate procedures or teaching.   The plan of care was discussed with Dr. Butler, house staff team and Dr. Aman Sanderson [facility]  [phone]  Lauro "clinical impact" ***Rapid Response Critical Care Nurse Practitioner Note***    Patient is a 23y old  Female admitted for HPI:  24 yo  at 34w0d presents with  contractions. Per patient, she left AMA from [facility] earlier this evening after presenting with  contractions. Per chart review patient has gHTN, not yet meeting criteria for PEC w/ SF. Patient is complaining of contractions [id number] and rectal pressure. Upon arrive BP is 176/114. She denies leakage of fluid, vaginal bleeding. Endorses fetal movement. Patient denies headache, blurry vision, shortness of breath, RUQ pain. Patient is requesting epidural for contractions. (15 Dec 2023 01:05)    Rapid response team called @ 1623 for shortness of breath and difficulty breathing after a family member was eating a food product with shrimp(patient with known allergy to shellfish)     Patient was seen and examined at the bedside by the rapid response team. Upon arrival of RRT OB team, RNs @ bedside patient is alert, following commands c/o shortness of breath and difficulty breathing. T: Afebrile HR 90s BP 180s/100s RR 20s SPO2 100% on NRB. Anesthesia called given concern for poor air entry, respiratory distress and need for possible intubation. Epi 0.3mg IM given x 1, hydrocortisone 100mg IV, NS 1L bolus, benadryl IV, Mg 2g IVPB, epi infusion started, duoneb x 3, hydral 10mg IVP, 2nd PIV established and VBG drawn. S/p epi BPs 200s/110s hydral 10mg given x 1. Patient initially noted to have facial swelling, diminished breath sounds in all lung fields and intercostal retractions. S/p above interventions patient noted to have diffuse wheezing but improved aeration per discussed with critical care fellow/attending and OBGYN team patient transferred to MICU for ongoing management of anaphalyxis/asthma exacerbation.     Allergies  shellfish (Anaphylaxis)  No Known Drug Allergies  latex (Rash)    PAST MEDICAL & SURGICAL HISTORY:  ADHD  Asthma  Bipolar disorder  Manic depression  SMAS (superior mesenteric artery syndrome)  S/P D&C (status post dilation and curettage)    REVIEW OF SYSTEMS:   General: +fatigue. No fever/chills  HEENT: +congestion and facial swelling.   Resp: +cough, +SOB, +wheezing and subjective difficulty breathing  CV: +chest pain  ABD: +lower abd pain @ incision site  : +urgency and reports having to urinate  Skin: Pain @ incision site. Denies hives, rash or pruritus   MSK: Denies joint pain or swelling  Neuro: Denies dizziness or LOC    Vital Signs Last 24 Hrs  T(C): 36.7 (20 Dec 2023 14:00), Max: 36.9 (20 Dec 2023 06:00)  T(F): 98 (20 Dec 2023 14:00), Max: 98.4 (20 Dec 2023 06:00)  HR: 70 (20 Dec 2023 14:00) (65 - 98)  BP: 146/90 (20 Dec 2023 14:00) (101/55 - 161/115)  BP(mean): 109 (20 Dec 2023 14:00) (92 - 109)  RR: 16 (20 Dec 2023 14:00) (16 - 18)  SpO2: 100% (20 Dec 2023 14:00) (97% - 100%)    Parameters below as of 20 Dec 2023 14:00  Patient On (Oxygen Delivery Method): room air    Physical Exam  GENERAL: Alert female in hospital bed in acute distress  HEENT: +mild periorbital swelling. AC/NT, supple, no JVD, no stridor, uvula midline no obvious oropharyngeal swelling externally.   LUNGS: diminished through out, w intercostal retractions and abdominal muscle use  HEART: RRR +S1/S2 no R/G/M  ABDOMEN: +abdominal binder covering surgical incision. Abd rounded soft/nd/nt  EXTREMITIES: WWP   SKIN: No new rashes or lesions.  MSK: strength equal BL w normal reflexes  VASCULAR: +2 radial and DP pulses CRT< 2 sec   NEUROLOGIC: Grossly intact.  INCISIONS: s/p  incision overed  Lines: PIV x 2     EKG Tracing: NSR    MEDICATIONS  (STANDING):  acetaminophen     Tablet .. 975 milliGRAM(s) Oral <User Schedule>  bisacodyl 5 milliGRAM(s) Oral at bedtime  diphenhydrAMINE 50 milliGRAM(s) Oral once  enoxaparin Injectable 40 milliGRAM(s) SubCutaneous every 24 hours  EPINEPHrine     1 mG/mL Injectable 0.3 milliGRAM(s) IntraMuscular once  EPINEPHrine    Infusion 0.01 MICROgram(s)/kG/Min (2.56 mL/Hr) IV Continuous <Continuous>  hydrocortisone sodium succinate Injectable 100 milliGRAM(s) IV Push once  ibuprofen  Tablet. 600 milliGRAM(s) Oral every 6 hours  labetalol 400 milliGRAM(s) Oral every 8 hours  mometasone 220 MICROgram(s) Inhaler 2 Puff(s) Inhalation daily  oxytocin Infusion 333.333 milliUNIT(s)/Min (1000 mL/Hr) IV Continuous <Continuous>  polyethylene glycol 3350 17 Gram(s) Oral daily  racepinephrine  2.25% Inhalation 0.5 milliLiter(s) Inhalation once    MEDICATIONS  (PRN):  albuterol    90 MICROgram(s) HFA Inhaler 2 Puff(s) Inhalation every 6 hours PRN Shortness of Breath and/or Wheezing  diphenhydrAMINE 25 milliGRAM(s) Oral every 6 hours PRN Pruritus  lanolin Ointment 1 Application(s) Topical every 6 hours PRN Sore Nipples  magnesium hydroxide Suspension 30 milliLiter(s) Oral two times a day PRN Constipation  oxyCODONE    IR 5 milliGRAM(s) Oral once PRN Moderate to Severe Pain (4-10)  oxyCODONE    IR 5 milliGRAM(s) Oral every 3 hours PRN Moderate to Severe Pain (4-10)  senna 1 Tablet(s) Oral daily PRN Constipation  simethicone 80 milliGRAM(s) Chew every 4 hours PRN Gas      ASSESSMENT & PLAN  Rapid Response called for 23y year old Female with a past medical history of , post partum day 5 via , preeclampsia, asthma, gestational DM, bipolar DO, pAF(during delivery course) and ECHO 2 elevated PA pressures s/p rapid response 2/2 exposure to shrimp w c/f anaphylaxis s/p epi IM, infusion, benadryl, solucortef and duoneb with improvement of symptoms transferred to MICU for on going management of anaphylaxis also w possible asthma exacerbation and preeclampsia improved after intervention.     Plan-  -Admit to MICU for monitoring  -Duoneb q4h   -c/w steroid would consider dexamethasone 10mg x 1   -s/p Mg 2g   -Goal BP <160/110   -s/p hydralazine 10mg IVP x 1  -PRN hydralazine 10mg q20min x 3  -Low threshold to start Cardene infusion  -OBGYN following and recs appreciated per discussion can be reached for any questions for BP control okay w Cardene gtt if needed  -c/w Labetalol 400mg TID (home dose)  -Consider Pepcid 20mg   -neuro checks q3h  -finger sticks AC/HS while on steroid  -Seizure precautions   -Sent CBC/CMP/Lipase/Coags/ABG/Mg/Phos   -Send resp viral panel  -Consider CXR r/o PNA  -Peak flow pre/post duoneb  -Advance diet as tolerated   -Lactation consultation  ***Rescue strategies if patient's respiratory status is to deteriorate Epi 0.3mg IM x 3, restart infusion, consider ketamine 0.8mg/kg IVP x 1, BiPAP w low threshold for intubation. If hypertensive w preeclamptic features call OB STAT give Mg 4g STAT***     F: s/p 1liter bolus consider D5LR if remains NPO  E: K>4 Mg>2   D: Advance as tolerated NPO if BiPAP   GI PPx: Pepcid 20mg daily while on steroid  DVT PPx: SCDs  Lines: PIVx 2  Code Status: Full  Dispo: MICU     I have personally and independently provided 31 minutes of critical care services.  This excludes any time spent on separate procedures or teaching.   The plan of care was discussed with Dr. Butler, house staff team and Dr. Aman Sanderson [facility]  [phone]  Lauro "clinical impact" ***Rapid Response Critical Care Nurse Practitioner Note***    Patient is a 23y old  Female admitted for HPI:  22 yo  at 34w0d presents with  contractions. Per patient, she left AMA from [facility] earlier this evening after presenting with  contractions. Per chart review patient has gHTN, not yet meeting criteria for PEC w/ SF. Patient is complaining of contractions [id number] and rectal pressure. Upon arrive BP is 176/114. She denies leakage of fluid, vaginal bleeding. Endorses fetal movement. Patient denies headache, blurry vision, shortness of breath, RUQ pain. Patient is requesting epidural for contractions. (15 Dec 2023 01:05)    Rapid response team called @ 1623 for shortness of breath and difficulty breathing after a family member was eating a food product with shrimp(patient with known allergy to shellfish)     Patient was seen and examined at the bedside by the rapid response team. Upon arrival of RRT OB team, RNs @ bedside patient is alert, following commands c/o shortness of breath and difficulty breathing. T: Afebrile HR 90s BP 180s/100s RR 20s SPO2 100% on NRB. Anesthesia called given concern for poor air entry, respiratory distress and need for possible intubation. Epi 0.3mg IM given x 1, hydrocortisone 100mg IV, NS 1L bolus, benadryl IV, Mg 2g IVPB, epi infusion started, duoneb x 3, hydral 10mg IVP, 2nd PIV established and VBG drawn. S/p epi BPs 200s/110s hydral 10mg given x 1. Patient initially noted to have facial swelling, diminished breath sounds in all lung fields and intercostal retractions. S/p above interventions patient noted to have diffuse wheezing but improved aeration per discussed with critical care fellow/attending and OBGYN team patient transferred to MICU for ongoing management of anaphalyxis/asthma exacerbation.     Allergies  shellfish (Anaphylaxis)  No Known Drug Allergies  latex (Rash)    PAST MEDICAL & SURGICAL HISTORY:  ADHD  Asthma  Bipolar disorder  Manic depression  SMAS (superior mesenteric artery syndrome)  S/P D&C (status post dilation and curettage)    REVIEW OF SYSTEMS:   General: +fatigue. No fever/chills  HEENT: +congestion and facial swelling.   Resp: +cough, +SOB, +wheezing and subjective difficulty breathing  CV: +chest pain  ABD: +lower abd pain @ incision site  : +urgency and reports having to urinate  Skin: Pain @ incision site. Denies hives, rash or pruritus   MSK: Denies joint pain or swelling  Neuro: Denies dizziness or LOC    Vital Signs Last 24 Hrs  T(C): 36.7 (20 Dec 2023 14:00), Max: 36.9 (20 Dec 2023 06:00)  T(F): 98 (20 Dec 2023 14:00), Max: 98.4 (20 Dec 2023 06:00)  HR: 70 (20 Dec 2023 14:00) (65 - 98)  BP: 146/90 (20 Dec 2023 14:00) (101/55 - 161/115)  BP(mean): 109 (20 Dec 2023 14:00) (92 - 109)  RR: 16 (20 Dec 2023 14:00) (16 - 18)  SpO2: 100% (20 Dec 2023 14:00) (97% - 100%)    Parameters below as of 20 Dec 2023 14:00  Patient On (Oxygen Delivery Method): room air    Physical Exam  GENERAL: Alert female in hospital bed in acute distress  HEENT: +mild periorbital swelling. AC/NT, supple, no JVD, no stridor, uvula midline no obvious oropharyngeal swelling externally.   LUNGS: diminished through out, w intercostal retractions and abdominal muscle use  HEART: RRR +S1/S2 no R/G/M  ABDOMEN: +abdominal binder covering surgical incision. Abd rounded soft/nd/nt  EXTREMITIES: WWP   SKIN: No new rashes or lesions.  MSK: strength equal BL w normal reflexes  VASCULAR: +2 radial and DP pulses CRT< 2 sec   NEUROLOGIC: Grossly intact.  INCISIONS: s/p  incision overed  Lines: PIV x 2     EKG Tracing: NSR    MEDICATIONS  (STANDING):  acetaminophen     Tablet .. 975 milliGRAM(s) Oral <User Schedule>  bisacodyl 5 milliGRAM(s) Oral at bedtime  diphenhydrAMINE 50 milliGRAM(s) Oral once  enoxaparin Injectable 40 milliGRAM(s) SubCutaneous every 24 hours  EPINEPHrine     1 mG/mL Injectable 0.3 milliGRAM(s) IntraMuscular once  EPINEPHrine    Infusion 0.01 MICROgram(s)/kG/Min (2.56 mL/Hr) IV Continuous <Continuous>  hydrocortisone sodium succinate Injectable 100 milliGRAM(s) IV Push once  ibuprofen  Tablet. 600 milliGRAM(s) Oral every 6 hours  labetalol 400 milliGRAM(s) Oral every 8 hours  mometasone 220 MICROgram(s) Inhaler 2 Puff(s) Inhalation daily  oxytocin Infusion 333.333 milliUNIT(s)/Min (1000 mL/Hr) IV Continuous <Continuous>  polyethylene glycol 3350 17 Gram(s) Oral daily  racepinephrine  2.25% Inhalation 0.5 milliLiter(s) Inhalation once    MEDICATIONS  (PRN):  albuterol    90 MICROgram(s) HFA Inhaler 2 Puff(s) Inhalation every 6 hours PRN Shortness of Breath and/or Wheezing  diphenhydrAMINE 25 milliGRAM(s) Oral every 6 hours PRN Pruritus  lanolin Ointment 1 Application(s) Topical every 6 hours PRN Sore Nipples  magnesium hydroxide Suspension 30 milliLiter(s) Oral two times a day PRN Constipation  oxyCODONE    IR 5 milliGRAM(s) Oral once PRN Moderate to Severe Pain (4-10)  oxyCODONE    IR 5 milliGRAM(s) Oral every 3 hours PRN Moderate to Severe Pain (4-10)  senna 1 Tablet(s) Oral daily PRN Constipation  simethicone 80 milliGRAM(s) Chew every 4 hours PRN Gas      ASSESSMENT & PLAN  Rapid Response called for 23y year old Female with a past medical history of , post partum day 5 via , preeclampsia, asthma, gestational DM, bipolar DO, pAF(during delivery course) and ECHO 2 elevated PA pressures s/p rapid response 2/2 exposure to shrimp w c/f anaphylaxis s/p epi IM, infusion, benadryl, solucortef and duoneb with improvement of symptoms transferred to MICU for on going management of anaphylaxis also w possible asthma exacerbation and preeclampsia improved after intervention.     Plan-  -Admit to MICU for monitoring  -Duoneb q4h   -c/w steroid would consider dexamethasone 10mg x 1   -s/p Mg 2g   -Goal BP <160/110   -s/p hydralazine 10mg IVP x 1  -PRN hydralazine 10mg q20min x 3  -Low threshold to start Cardene infusion  -OBGYN following and recs appreciated per discussion can be reached for any questions for BP control okay w Cardene gtt if needed  -c/w Labetalol 400mg TID (home dose)  -Consider Pepcid 20mg   -neuro checks q3h  -finger sticks AC/HS while on steroid  -Seizure precautions   -Sent CBC/CMP/Lipase/Coags/ABG/Mg/Phos   -Send resp viral panel  -Consider CXR r/o PNA  -Peak flow pre/post duoneb  -Advance diet as tolerated   -Lactation consultation  ***Rescue strategies if patient's respiratory status is to deteriorate Epi 0.3mg IM x 3, restart infusion, consider ketamine 0.8mg/kg IVP x 1, BiPAP w low threshold for intubation. If hypertensive w preeclamptic features call OB STAT give Mg 4g STAT***     F: s/p 1liter bolus consider D5LR if remains NPO  E: K>4 Mg>2   D: Advance as tolerated NPO if BiPAP   GI PPx: Pepcid 20mg daily while on steroid  DVT PPx: SCDs  Lines: PIVx 2  Code Status: Full  Dispo: MICU     I have personally and independently provided 31 minutes of critical care services.  This excludes any time spent on separate procedures or teaching.   The plan of care was discussed with Dr. Butler, house staff team and Dr. Aman Sanderson [facility]  [phone]  Lauro "clinical impact"

## 2023-12-20 NOTE — DISCHARGE NOTE OB - PROVIDER TOKENS
FREE:[LAST:[[address] [place] [address] OBGYN],PHONE:[phone]],FAX:[(   )    -],ADDRESS:[[address] [place] [address]]] FREE:[LAST:[[address] [place] [address] OBGYN],PHONE:[phone]],FAX:[(   )    -],ADDRESS:[[address] [place] [address]]]

## 2023-12-20 NOTE — PROGRESS NOTE ADULT - ASSESSMENT
23F with SMA syndrome, previous hx of cardiac arrest s/p delivery , asthma (Flovent and PRN albuterol) who presented for  contracts. Pt is now s/p Csection 12/15. Course complicated by PEC with SF, requiring IV magnesium. Post op, pt was noted to be hypothermic to 93F, as well as in Afib. Cardiology consulted for management of Afib and Medicine consulted for management of post-op hypothermia and hyponatremia as well as co-management.    #New onset AFib  #gestational HTN  #SMA syndrome   Cardiology consullted- reccs appreciated  TTE: normal LV function, mild MR, Color flow doppler reveals evidence of a patent foramen ovale Vs a small secundum atrial septal defect  per cardiology, transitioned from Nifedipine to Labetalol 400mg PO TID  no need for AC at this time (CHADsVASc 1)  per EP, no plan for loop recorder at this time as patient is actively breast feeding. Pt will follow up outpatient  pt planning to transition care to [facility] for continued management and follow up    #constipation  pt currently on Oxycodone, likely contributing to constipation  recommend increasing miralax from qD to BID, as well as continued dulcolax and senna    #asthma  uses flovent and albuterol PRN at home     #hypovolemic hypoNa - RESOLVED  Pt initially presented with sodium of 132, then dropped to Na of 127, now improved to 135  encourage PO intake, will continue to trend BMP    Case discussed and seen with Dr. Matias. Med consult will continue to follow. No medicine contraindications to discharge   23F with SMA syndrome, previous hx of cardiac arrest s/p delivery , asthma (Flovent and PRN albuterol) who presented for  contracts. Pt is now s/p Csection 12/15. Course complicated by PEC with SF, requiring IV magnesium. Post op, pt was noted to be hypothermic to 93F, as well as in Afib. Cardiology consulted for management of Afib and Medicine consulted for management of post-op hypothermia and hyponatremia as well as co-management.    #New onset AFib  #gestational HTN  #SMA syndrome   Cardiology consullted- reccs appreciated  TTE: normal LV function, mild MR, Color flow doppler reveals evidence of a patent foramen ovale Vs a small secundum atrial septal defect  per cardiology, transitioned from Nifedipine to Labetalol 400mg PO TID  no need for AC at this time (CHADsVASc 1)  per EP, no plan for loop recorder at this time as patient is actively breast feeding. Pt will follow up outpatient  pt planning to transition care to [facility] for continued management and follow up    #constipation  pt currently on Oxycodone, likely contributing to constipation  recommend increasing miralax from qD to BID, as well as continued dulcolax and senna    #asthma  uses flovent and albuterol PRN at home     #hypovolemic hypoNa - RESOLVED  Pt initially presented with sodium of 132, then dropped to Na of 127, now improved to 135  encourage PO intake, will continue to trend BMP    Case discussed and seen with Dr. Matias. Med consult will continue to follow. No medicine contraindications to discharge

## 2023-12-20 NOTE — PROVIDER CONTACT NOTE (CHANGE IN STATUS NOTIFICATION) - BACKGROUND
Pt is POD#5 s/p . Admitted for PEC. S/p mag gtt -dc'd on  at 12:30 PM. Pt has had  in 2016 (she had a cardiac arrest). 2x termination D/C. 3x Spontaneous . hx of nutcracker syndrome. Anxiety/depression. Bipolar. pt had a discharge order in and was due to be discharged home prior to this event.

## 2023-12-20 NOTE — DISCHARGE NOTE OB - PLAN OF CARE
- Continue taking labetalol 400mg three times per day   - Follow up with your OB in one week for a blood pressure check (call to confirm appointment).  - Purchase electronic blood pressure cuff at your local pharmacy.   - Check blood pressure 3x a day and write it down (bring to your doctor's appointment).   - If BP >150/100 please call your doctor.   - If BP >160/110, you develop a headache not relieved by tylenol, visual disturbances, or have right upper abdominal pain, call your doctor or the hospital, or go to your nearest emergency room.   - Regular diet, normal activity, nothing in the vagina for 6 weeks--no sex, tampons, tub baths, or swimming pools. - Please follow up with cardiologist Dr. Skaggs in 1-2 weeks- please call [phone] to make an appointment  - You will be receiving a call from the electrophysiology doctor to follow up about placing a loop recorder to track your heart rate - Please follow up with cardiologist Dr. Skaggs in 1-2 weeks- please call [phone] to make an appointment  - You will be receiving a call from the electrophysiology doctor to follow up about placing a loop recorder to track your heart rate - Take Motrin 600mg every 6 hours and/or tylenol 650mg every 6 hours as needed for pain.    - Call your OBGYN to schedule a follow up appointment in 1-2weeks.   - Keep incision site clean and dry.   - Call your OBGYN if you experience severe abdominal pain not improved by oral pain medications, heavy bright red vaginal bleeding saturating more than 1 pad per hour, redness/warmth at incision site, drainage from incision site, or fever greater than 100.4F. - Take Motrin 600mg every 6 hours and/or tylenol 650mg every 6 hours as needed for pain.    - Call your OBGYN to schedule a follow up appointment in 1-2weeks.   - Keep incision site clean and dry.   - There is no wound care required for your incision site, you can let soapy water run over it when in the shower, please don't scrub at incision site.  - Call your OBGYN if you experience severe abdominal pain not improved by oral pain medications, heavy bright red vaginal bleeding saturating more than 1 pad per hour, redness/warmth at incision site, drainage from incision site, or fever greater than 100.4F.

## 2023-12-20 NOTE — DISCHARGE NOTE OB - PATIENT PORTAL LINK FT
You can access the FollowMyHealth Patient Portal offered by [facility] by registering at the following website: [email or web address]. By joining [employer]’s FollowMyHealth portal, you will also be able to view your health information using other applications (apps) compatible with our system. You can access the FollowMyHealth Patient Portal offered by [facility] by registering at the following website: [email or web address]. By joining [employer]’s FollowMyHealth portal, you will also be able to view your health information using other applications (apps) compatible with our system.

## 2023-12-20 NOTE — PROGRESS NOTE ADULT - ASSESSMENT
22 yo F with PMH asthma, superior mesenteric artery syndrome, cardiac arrest after previous delivery, who presented with  contractions at 34 weeks, found to have gHTN and PEC w/ SF, now s/p . Noted to be in AFib during labor so cardiology consulted.    Review of Studies:  EKG 12/15 9AM: Afib RVR, rate 101  EKG  12/15 10pm: NSR, rate 81, no ischemic changes  TTE 23: Normal biventricular size/function. Mild LVH. Mildly dilated LA. PFO vs small ASD. Mild MR. Mild TR. PASP 45. Trivial pericardial effusion.     #New onset atrial fibrillation  - EKG noted with Afib during labor/prior to OR and now returned to NSR - likely paroxysmal  - CHADSVASC 1 and Afib duration < 24h. Would not start AC at this time.   - replete electrolytes for goal K>4, Mg>2  - EP follow-up for event monitor    #gHTN      #PFO vs ASD  - Outpatient follow-up    #SMA syndrome  - pt can f/u with vascular    Pt can follow up with Dr. Latonia Skaggs as an outpatient.           24 yo F with PMH asthma, superior mesenteric artery syndrome, cardiac arrest after previous delivery, who presented with  contractions at 34 weeks, found to have gHTN and PEC w/ SF, now s/p . Noted to be in AFib during labor so cardiology consulted.    Review of Studies:  EKG 12/15 9AM: Afib RVR, rate 101  EKG  12/15 10pm: NSR, rate 81, no ischemic changes  TTE 23: Normal biventricular size/function. Mild LVH. Mildly dilated LA. PFO vs small ASD. Mild MR. Mild TR. PASP 45. Trivial pericardial effusion.     #New onset atrial fibrillation  - EKG noted with Afib during labor/prior to OR and now returned to NSR - likely paroxysmal  - CHADSVASC 1 and Afib duration < 24h. Would not start AC at this time.   - replete electrolytes for goal K>4, Mg>2  - EP follow-up for event monitor    #gHTN      #PFO vs ASD  - Outpatient follow-up    #SMA syndrome  - pt can f/u with vascular    Pt can follow up with Dr. Latonia Skaggs as an outpatient.           22 yo F with PMH asthma, superior mesenteric artery syndrome, cardiac arrest after previous delivery, who presented with  contractions at 34 weeks, found to have gHTN and PEC w/ SF, now s/p . Noted to be in AFib during labor so cardiology consulted.    Review of Studies:  EKG 12/15 9AM: Afib RVR, rate 101  EKG  12/15 10pm: NSR, rate 81, no ischemic changes  TTE 23: Normal biventricular size/function. Mild LVH. Mildly dilated LA. PFO vs small ASD. Mild MR. Mild TR. PASP 45. Trivial pericardial effusion.     #New onset atrial fibrillation  - EKG noted with Afib during labor/prior to OR and now returned to NSR - likely paroxysmal  - CHADSVASC 1 and Afib duration < 24h. Would not start AC at this time.   - replete electrolytes for goal K>4, Mg>2  - EP follow-up for event monitor    #gHTN  -Continue labetolol 400mg TID    #PFO vs ASD  - Outpatient follow-up    #SMA syndrome  - pt can f/u with vascular    Pt can follow up with Dr. Latonia Skaggs as an outpatient.

## 2023-12-20 NOTE — CHART NOTE - NSCHARTNOTEFT_GEN_A_CORE
Note delayed due to patient care  Notified by nursing patient sustained severe range BPs >160/>110 over 15 minutes. Decision made to pursue rapid blood pressure control. Patient seen at bedside and situation explained to patient as well as decision to IV push Labetalol 20mg. Patient verbalized understanding. IV Labetalol 20mg push given at 1906. Repeat blood pressure mild range.

## 2023-12-20 NOTE — PROGRESS NOTE ADULT - SUBJECTIVE AND OBJECTIVE BOX
OVERNIGHT EVENTS: MARC    SUBJECTIVE:  Patient seen and examined at bedside. STates she is ready for discharge. Has some mild pain near  site,otherwise doing well. No headache, chest pain, shortness of breath, abdominal pain.    Vital Signs Last 12 Hrs  T(F): 98 (23 @ 14:00), Max: 98.4 (23 @ 06:00)  HR: 70 (23 @ 14:00) (67 - 79)  BP: 146/90 (23 @ 14:00) (101/55 - 154/98)  BP(mean): 109 (23 @ 14:00) (92 - 109)  RR: 16 (23 @ 14:00) (16 - 18)  SpO2: 100% (23 @ 14:00) (97% - 100%)  I&O's Summary      PHYSICAL EXAM:  GENERAL: NAD, sitting up in bed  HEAD:  Atraumatic, Normocephalic  EYES: EOMI, PERRLA, conjunctiva and sclera clear  ENMT: Moist mucous membranes  NECK: Supple  NERVOUS SYSTEM:  Alert & Oriented X3, Good concentration  CHEST/LUNG: Clear to percussion bilaterally; No rales, rhonchi, wheezing, or rubs  HEART: Regular rate and rhythm; No murmurs, rubs, or gallops  ABDOMEN: Soft, Nontender, Nondistended; Bowel sounds present  EXTREMITIES:  2+ Peripheral Pulses, No edema        LABS:        RADIOLOGY & ADDITIONAL TESTS:    MEDICATIONS  (STANDING):  acetaminophen     Tablet .. 975 milliGRAM(s) Oral <User Schedule>  bisacodyl 5 milliGRAM(s) Oral at bedtime  enoxaparin Injectable 40 milliGRAM(s) SubCutaneous every 24 hours  ibuprofen  Tablet. 600 milliGRAM(s) Oral every 6 hours  labetalol 400 milliGRAM(s) Oral every 8 hours  mometasone 220 MICROgram(s) Inhaler 2 Puff(s) Inhalation daily  oxytocin Infusion 333.333 milliUNIT(s)/Min (1000 mL/Hr) IV Continuous <Continuous>  polyethylene glycol 3350 17 Gram(s) Oral daily    MEDICATIONS  (PRN):  albuterol    90 MICROgram(s) HFA Inhaler 2 Puff(s) Inhalation every 6 hours PRN Shortness of Breath and/or Wheezing  diphenhydrAMINE 25 milliGRAM(s) Oral every 6 hours PRN Pruritus  lanolin Ointment 1 Application(s) Topical every 6 hours PRN Sore Nipples  magnesium hydroxide Suspension 30 milliLiter(s) Oral two times a day PRN Constipation  oxyCODONE    IR 5 milliGRAM(s) Oral once PRN Moderate to Severe Pain (4-10)  oxyCODONE    IR 5 milliGRAM(s) Oral every 3 hours PRN Moderate to Severe Pain (4-10)  senna 1 Tablet(s) Oral daily PRN Constipation  simethicone 80 milliGRAM(s) Chew every 4 hours PRN Gas

## 2023-12-20 NOTE — DISCHARGE NOTE OB - CARE PLAN
1 Principal Discharge DX:	Pregnancy, delivered  Secondary Diagnosis:	Paroxysmal atrial fibrillation  Secondary Diagnosis:	Preeclampsia, severe   Principal Discharge DX:	Pregnancy, delivered  Assessment and plan of treatment:	- Take Motrin 600mg every 6 hours and/or tylenol 650mg every 6 hours as needed for pain.    - Call your OBGYN to schedule a follow up appointment in 1-2weeks.   - Keep incision site clean and dry.   - Call your OBGYN if you experience severe abdominal pain not improved by oral pain medications, heavy bright red vaginal bleeding saturating more than 1 pad per hour, redness/warmth at incision site, drainage from incision site, or fever greater than 100.4F.  Secondary Diagnosis:	Paroxysmal atrial fibrillation  Assessment and plan of treatment:	- Please follow up with cardiologist Dr. Skaggs in 1-2 weeks- please call [phone] to make an appointment  - You will be receiving a call from the electrophysiology doctor to follow up about placing a loop recorder to track your heart rate  Secondary Diagnosis:	Preeclampsia, severe  Assessment and plan of treatment:	- Continue taking labetalol 400mg three times per day   - Follow up with your OB in one week for a blood pressure check (call to confirm appointment).  - Purchase electronic blood pressure cuff at your local pharmacy.   - Check blood pressure 3x a day and write it down (bring to your doctor's appointment).   - If BP >150/100 please call your doctor.   - If BP >160/110, you develop a headache not relieved by tylenol, visual disturbances, or have right upper abdominal pain, call your doctor or the hospital, or go to your nearest emergency room.   - Regular diet, normal activity, nothing in the vagina for 6 weeks--no sex, tampons, tub baths, or swimming pools.   Principal Discharge DX:	Pregnancy, delivered  Assessment and plan of treatment:	- Take Motrin 600mg every 6 hours and/or tylenol 650mg every 6 hours as needed for pain.    - Call your OBGYN to schedule a follow up appointment in 1-2weeks.   - Keep incision site clean and dry.   - Call your OBGYN if you experience severe abdominal pain not improved by oral pain medications, heavy bright red vaginal bleeding saturating more than 1 pad per hour, redness/warmth at incision site, drainage from incision site, or fever greater than 100.4F.  Secondary Diagnosis:	Paroxysmal atrial fibrillation  Assessment and plan of treatment:	- Please follow up with cardiologist Dr. Skaggs in 1-2 weeks- please call [phone] to make an appointment  - You will be receiving a call from the electrophysiology doctor to follow up about placing a loop recorder to track your heart rate  Secondary Diagnosis:	Preeclampsia, severe  Assessment and plan of treatment:	- Continue taking labetalol 400mg three times per day   - Follow up with your OB in one week for a blood pressure check (call to confirm appointment).  - Purchase electronic blood pressure cuff at your local pharmacy.   - Check blood pressure 3x a day and write it down (bring to your doctor's appointment).   - If BP >150/100 please call your doctor.   - If BP >160/110, you develop a headache not relieved by tylenol, visual disturbances, or have right upper abdominal pain, call your doctor or the hospital, or go to your nearest emergency room.   - Regular diet, normal activity, nothing in the vagina for 6 weeks--no sex, tampons, tub baths, or swimming pools.   Principal Discharge DX:	Pregnancy, delivered  Assessment and plan of treatment:	- Take Motrin 600mg every 6 hours and/or tylenol 650mg every 6 hours as needed for pain.    - Call your OBGYN to schedule a follow up appointment in 1-2weeks.   - Keep incision site clean and dry.   - There is no wound care required for your incision site, you can let soapy water run over it when in the shower, please don't scrub at incision site.  - Call your OBGYN if you experience severe abdominal pain not improved by oral pain medications, heavy bright red vaginal bleeding saturating more than 1 pad per hour, redness/warmth at incision site, drainage from incision site, or fever greater than 100.4F.  Secondary Diagnosis:	Paroxysmal atrial fibrillation  Assessment and plan of treatment:	- Please follow up with cardiologist Dr. Skaggs in 1-2 weeks- please call [phone] to make an appointment  - You will be receiving a call from the electrophysiology doctor to follow up about placing a loop recorder to track your heart rate  Secondary Diagnosis:	Preeclampsia, severe  Assessment and plan of treatment:	- Continue taking labetalol 400mg three times per day   - Follow up with your OB in one week for a blood pressure check (call to confirm appointment).  - Purchase electronic blood pressure cuff at your local pharmacy.   - Check blood pressure 3x a day and write it down (bring to your doctor's appointment).   - If BP >150/100 please call your doctor.   - If BP >160/110, you develop a headache not relieved by tylenol, visual disturbances, or have right upper abdominal pain, call your doctor or the hospital, or go to your nearest emergency room.   - Regular diet, normal activity, nothing in the vagina for 6 weeks--no sex, tampons, tub baths, or swimming pools.   Principal Discharge DX:	Pregnancy, delivered  Assessment and plan of treatment:	- Take Motrin 600mg every 6 hours and/or tylenol 650mg every 6 hours as needed for pain.    - Call your OBGYN to schedule a follow up appointment in 1-2weeks.   - Keep incision site clean and dry.   - There is no wound care required for your incision site, you can let soapy water run over it when in the shower, please don't scrub at incision site.  - Call your OBGYN if you experience severe abdominal pain not improved by oral pain medications, heavy bright red vaginal bleeding saturating more than 1 pad per hour, redness/warmth at incision site, drainage from incision site, or fever greater than 100.4F.  Secondary Diagnosis:	Paroxysmal atrial fibrillation  Assessment and plan of treatment:	- Please follow up with cardiologist Dr. Skaggs in 1-2 weeks- please call [phone] to make an appointment  - You will be receiving a call from the electrophysiology doctor to follow up about placing a loop recorder to track your heart rate  Secondary Diagnosis:	Preeclampsia, severe  Assessment and plan of treatment:	- Continue taking labetalol 400mg three times per day   - Follow up with your OB in one week for a blood pressure check (call to confirm appointment).  - Purchase electronic blood pressure cuff at your local pharmacy.   - Check blood pressure 3x a day and write it down (bring to your doctor's appointment).   - If BP >150/100 please call your doctor.   - If BP >160/110, you develop a headache not relieved by tylenol, visual disturbances, or have right upper abdominal pain, call your doctor or the hospital, or go to your nearest emergency room.   - Regular diet, normal activity, nothing in the vagina for 6 weeks--no sex, tampons, tub baths, or swimming pools.

## 2023-12-20 NOTE — DISCHARGE NOTE OB - HOSPITAL COURSE
24yo  s/p pCS at 34w0d due to NRFHT after IOL for preeclampsia with severe features (BP). Labor was complicated by paroxysmal AFib, which resolved into sinus rhythm. Patient had TTE 2023 which showed:  Evidence of patent foramen ovale and small secundum atrial septal defect, mild pulmonary hypertension (45mmHg), and EF 67%. Cardiology was consulted and patient was maintained on labetalol 400mg TID primarily for BP control (also appropriate given paroxysmal AFib). CHADSVASC score of 1, no anticoagulation indicated. She is to follow up with Dr. Skaggs as outpatient as she will need outpatient BRADY to evaluate ASD vs PFO after done with breastfeeding (anesthesia) and resolution of pre-eclampsia and surveillance TTE q3-5y for mild mitral and tricuspid regurgitation. Of note during labor patient had episode of hypothermia and altered mental status (somnolence) - sepsis ruled out, urine toxicology negative, symptoms self-resolved. Upon discharge vitals are stable and patient has met all postpartum milestones appropriately.  - EP will contact patient to schedule appointment for loop recorder as outpatient.

## 2023-12-20 NOTE — CHART NOTE - NSCHARTNOTEFT_GEN_A_CORE
Pt now transferred to MICU for escalation of care for non-reassuring respiratory status after exposure to shrimp, i/s/o known hx of anaphylaxis in the past after shrimp exposure.   OB Recs:  - Pt w/ known hx of preeclampsia with severe features on labetalol 400mg TID: continue standing labetalol. If BP is 160/110 or more on 2 consecutive readings 15 minutes apart, may administer hydralazine 10mg IVP, then check BP in 20 minutes. If remain elevated, will require repeat IV push of BP meds. Hydralazine 10mg may be administered twice in a 24 hour period. Alternative is labetalol with the following protocol: 20mg IVP then recheck BP in 10 minutes, if still 160/110 or greater, may administer 40mg (24 hour max dosing is as follows: 20mg, 40mg, 80mg, 80mg, and 80mg). Consider nicardipine drip if these options have been exhausted and BP's still remain 160/110 or greater.  - Pt is at risk of eclampsia i/s/o postpartum state w/ known preeclampsia with severe features. Please call OB STAT overhead if patient has seizure. Treatment would be 4g IV magnesium bolus followed by IV magnesium 2g/hr.  - please continue pad counts. Lochia considered normal if pt is not bleeding more than 1 saturated pad per hour. If patient has a postpartum hemorrhage, please call OB STAT overhead.  - OB will continue to closely follow. Pt now transferred to MICU for escalation of care for non-reassuring respiratory status concerning for possible anaphylaxis after exposure to shrimp, i/s/o known hx of anaphylaxis in the past after shrimp exposure. Pt was slated for discharge today, however, she ordered shrimp/shellfish containing food for her visitors prior to leaving and became short of breath. Rapid response called. Non-rebreather placed for supplemental O2, epi IM x1 given. SPO2 was 100%, RR in the 20's, BP's 180's/100's, HR 90's. Anesthesia, rapid response team, , OB chief resident at bedside. Solu-cortef, benadryl, IV Mg 2g, duonebs x3 given by rapid response team. Epi drip started. Albuterol nebulizer administered. Second IV place, VBG drawn. Decision made to monitor pt in MICU. Prior to transporting pt, BP's noted to be in the 200's/110's, IV hydralazine 10mg given at 17:56, w/ 20 minute repeat BP in the mild range. Improved aeration per rapid response team at time of pt transfer.     OB Recs:  - Pt w/ known hx of preeclampsia with severe features on labetalol 400mg TID: continue standing labetalol. If BP is 160/110 or more on 2 consecutive readings 15 minutes apart, may administer hydralazine 10mg IVP, then check BP in 20 minutes. If remain elevated, will require repeat IV push of BP meds. Hydralazine 10mg may be administered twice in a 24 hour period. Alternative is labetalol with the following protocol: 20mg IVP then recheck BP in 10 minutes, if still 160/110 or greater, may administer 40mg (24 hour max dosing is as follows: 20mg, 40mg, 80mg, 80mg, and 80mg). Consider nicardipine drip if these options have been exhausted and BP's still remain 160/110 or greater.  - Pt is at risk of eclampsia i/s/o postpartum state w/ known preeclampsia with severe features. Please call OB STAT overhead if patient has seizure. Treatment would be 4g IV magnesium bolus followed by IV magnesium 2g/hr.  - please continue pad counts. Lochia considered normal if pt is not bleeding more than 1 saturated pad per hour. If patient has a postpartum hemorrhage, please call OB STAT overhead.  - OB will continue to closely follow.

## 2023-12-20 NOTE — PROGRESS NOTE ADULT - SUBJECTIVE AND OBJECTIVE BOX
INTERVAL EVENTS:  -Required hydral 10 IV for BP 160s on 12/19 pm  -Labetolol increased to 400mg TID    PAST MEDICAL & SURGICAL HISTORY:  ADHD    Asthma    Bipolar disorder    Manic depression    SMAS (superior mesenteric artery syndrome)    No significant past surgical history    S/P D&C (status post dilation and curettage)        MEDICATIONS  (STANDING):  acetaminophen     Tablet .. 975 milliGRAM(s) Oral <User Schedule>  bisacodyl 5 milliGRAM(s) Oral at bedtime  enoxaparin Injectable 40 milliGRAM(s) SubCutaneous every 24 hours  ibuprofen  Tablet. 600 milliGRAM(s) Oral every 6 hours  labetalol 400 milliGRAM(s) Oral every 8 hours  mometasone 220 MICROgram(s) Inhaler 2 Puff(s) Inhalation daily  oxytocin Infusion 333.333 milliUNIT(s)/Min (1000 mL/Hr) IV Continuous <Continuous>  polyethylene glycol 3350 17 Gram(s) Oral daily    MEDICATIONS  (PRN):  albuterol    90 MICROgram(s) HFA Inhaler 2 Puff(s) Inhalation every 6 hours PRN Shortness of Breath and/or Wheezing  diphenhydrAMINE 25 milliGRAM(s) Oral every 6 hours PRN Pruritus  lanolin Ointment 1 Application(s) Topical every 6 hours PRN Sore Nipples  magnesium hydroxide Suspension 30 milliLiter(s) Oral two times a day PRN Constipation  oxyCODONE    IR 5 milliGRAM(s) Oral every 3 hours PRN Moderate to Severe Pain (4-10)  oxyCODONE    IR 5 milliGRAM(s) Oral once PRN Moderate to Severe Pain (4-10)  senna 1 Tablet(s) Oral daily PRN Constipation  simethicone 80 milliGRAM(s) Chew every 4 hours PRN Gas    T(F): 98.4 (12-20-23 @ 06:00), Max: 98.4 (12-20-23 @ 06:00)  HR: 71 (12-20-23 @ 06:00) (65 - 98)  BP: 154/98 (12-20-23 @ 06:00) (121/82 - 161/115)  BP(mean): --  ABP: --  ABP(mean): --  RR: 18 (12-20-23 @ 06:00) (16 - 18)  SpO2: 100% (12-20-23 @ 06:00) (100% - 100%)    I/O Detail 24H      PHYSICAL EXAM:  GEN: NAD  HEENT: EOMI   RESP: CTA b/l  CV: RRR. Normal S1/S2. No m/r/g.  ABD: soft, non-distended  EXT: No edema   NEURO: alert and attentive    LABS:      Hgb trend:   WBC trend:               Serum Cr trend:         Cardiac Markers           STUDIES:

## 2023-12-20 NOTE — PROVIDER CONTACT NOTE (CHANGE IN STATUS NOTIFICATION) - ACTION/TREATMENT ORDERED:
Pt received Benadryl 50mg IM (16:24), Epineprine 0.3ml IM (16:25), SoluCortef 100mg (16:29), NS IVfluids 1000ml (16:30pm), Epinephrine gtt 0.01mg per hour, Nebulizer with albuterol x 3 via mask (16:37), pt BP  elevated 209/144, HR 76- was given Mg 2g in 20 min (16:39), 10mg Hydralazine IVP( 16:55)- BP was 154/101 at 17:05, HR 71

## 2023-12-20 NOTE — PROGRESS NOTE ADULT - SUBJECTIVE AND OBJECTIVE BOX
Hospital Course :    22yo  s/p pCS at 34w0d due to NRFHT after IOL for preeclampsia with severe features (BP). Labor was complicated by paroxysmal AFib, which resolved into sinus rhythm. Patient had TTE 2023 which showed:  Evidence of patent foramen ovale and small secundum atrial septal defect, mild pulmonary hypertension (45mmHg), and EF 67%. Cardiology was consulted and patient was maintained on labetalol 400mg TID primarily for BP control (also appropriate given paroxysmal AFib). CHADSVASC score of 1, no anticoagulation indicated. She is to follow up with Dr. Skaggs as outpatient as she will need outpatient BRADY to evaluate ASD vs PFO after done with breastfeeding (anesthesia) and resolution of pre-eclampsia and surveillance TTE q3-5y for mild mitral and tricuspid regurgitation. Of note during labor patient had episode of hypothermia and altered mental status (somnolence) - sepsis ruled out, urine toxicology negative, symptoms self-resolved. Upon discharge vitals are stable and patient has met all postpartum milestones appropriately.  - EP will contact patient to schedule appointment for loop recorder as outpatient.      Vital Signs Last 12 Hrs  T(F): 98 (-23 @ 14:00), Max: 98.4 (-23 @ 06:00)  HR: 70 (--23 @ 14:00) (67 - 79)  BP: 146/90 (12-20-23 @ 14:00) (101/55 - 154/98)  BP(mean): 109 (-20-23 @ 14:00) (92 - 109)  RR: 16 (-23 @ 14:00) (16 - 18)  SpO2: 100% (-20-23 @ 14:00) (97% - 100%)  I&O's Summary      PHYSICAL EXAM:  GENERAL: NAD, sitting up in bed  HEAD:  Atraumatic, Normocephalic  EYES: EOMI, PERRLA, conjunctiva and sclera clear  ENMT: Moist mucous membranes  NECK: Supple  NERVOUS SYSTEM:  Alert & Oriented X3, Good concentration  CHEST/LUNG: Clear to percussion bilaterally; No rales, rhonchi, wheezing, or rubs  HEART: Regular rate and rhythm; No murmurs, rubs, or gallops  ABDOMEN: Soft, Nontender, Nondistended; Bowel sounds present  EXTREMITIES:  2+ Peripheral Pulses, No edema        LABS:        RADIOLOGY & ADDITIONAL TESTS:    MEDICATIONS  (STANDING):  acetaminophen     Tablet .. 975 milliGRAM(s) Oral <User Schedule>  bisacodyl 5 milliGRAM(s) Oral at bedtime  enoxaparin Injectable 40 milliGRAM(s) SubCutaneous every 24 hours  ibuprofen  Tablet. 600 milliGRAM(s) Oral every 6 hours  labetalol 400 milliGRAM(s) Oral every 8 hours  mometasone 220 MICROgram(s) Inhaler 2 Puff(s) Inhalation daily  oxytocin Infusion 333.333 milliUNIT(s)/Min (1000 mL/Hr) IV Continuous <Continuous>  polyethylene glycol 3350 17 Gram(s) Oral daily    MEDICATIONS  (PRN):  albuterol    90 MICROgram(s) HFA Inhaler 2 Puff(s) Inhalation every 6 hours PRN Shortness of Breath and/or Wheezing  diphenhydrAMINE 25 milliGRAM(s) Oral every 6 hours PRN Pruritus  lanolin Ointment 1 Application(s) Topical every 6 hours PRN Sore Nipples  magnesium hydroxide Suspension 30 milliLiter(s) Oral two times a day PRN Constipation  oxyCODONE    IR 5 milliGRAM(s) Oral once PRN Moderate to Severe Pain (4-10)  oxyCODONE    IR 5 milliGRAM(s) Oral every 3 hours PRN Moderate to Severe Pain (4-10)  senna 1 Tablet(s) Oral daily PRN Constipation  simethicone 80 milliGRAM(s) Chew every 4 hours PRN Gas   ***********ACCEPTANCE NOTE TO MICU**********    Hospital Course :    22yo  s/p pCS at 34w0d due to NRFHT after IOL for preeclampsia with severe features (BP). Labor was complicated by paroxysmal AFib, which resolved into sinus rhythm. Patient had TTE 2023 which showed:  Evidence of patent foramen ovale and small secundum atrial septal defect, mild pulmonary hypertension (45mmHg), and EF 67%. Cardiology was consulted and patient was maintained on labetalol 400mg TID primarily for BP control (also appropriate given paroxysmal AFib). CHADSVASC score of 1, no anticoagulation indicated. She is to follow up with Dr. Skaggs as outpatient as she will need outpatient BRADY to evaluate ASD vs PFO after done with breastfeeding (anesthesia) and resolution of pre-eclampsia and surveillance TTE q3-5y for mild mitral and tricuspid regurgitation. Of note during labor patient had episode of hypothermia and altered mental status (somnolence) - sepsis ruled out, urine toxicology negative, symptoms self-resolved. Upon discharge vitals are stable and patient has met all postpartum milestones appropriately.  - EP will contact patient to schedule appointment for loop recorder as outpatient.      Vital Signs Last 12 Hrs  T(F): 98 (23 @ 14:00), Max: 98.4 (23 @ 06:00)  HR: 70 (23 @ 14:00) (67 - 79)  BP: 146/90 (23 @ 14:00) (101/55 - 154/98)  BP(mean): 109 (23 @ 14:00) (92 - 109)  RR: 16 (23 @ 14:00) (16 - 18)  SpO2: 100% (23 @ 14:00) (97% - 100%)  I&O's Summary      PHYSICAL EXAM:  GENERAL: NAD, sitting up in bed  HEAD:  Atraumatic, Normocephalic  EYES: EOMI, PERRLA, conjunctiva and sclera clear  ENMT: Moist mucous membranes  NECK: Supple  NERVOUS SYSTEM:  Alert & Oriented X3, Good concentration  CHEST/LUNG: Clear to percussion bilaterally; No rales, rhonchi, wheezing, or rubs  HEART: Regular rate and rhythm; No murmurs, rubs, or gallops  ABDOMEN: Soft, Nontender, Nondistended; Bowel sounds present  EXTREMITIES:  2+ Peripheral Pulses, No edema        LABS:        RADIOLOGY & ADDITIONAL TESTS:    MEDICATIONS  (STANDING):  acetaminophen     Tablet .. 975 milliGRAM(s) Oral <User Schedule>  bisacodyl 5 milliGRAM(s) Oral at bedtime  enoxaparin Injectable 40 milliGRAM(s) SubCutaneous every 24 hours  ibuprofen  Tablet. 600 milliGRAM(s) Oral every 6 hours  labetalol 400 milliGRAM(s) Oral every 8 hours  mometasone 220 MICROgram(s) Inhaler 2 Puff(s) Inhalation daily  oxytocin Infusion 333.333 milliUNIT(s)/Min (1000 mL/Hr) IV Continuous <Continuous>  polyethylene glycol 3350 17 Gram(s) Oral daily    MEDICATIONS  (PRN):  albuterol    90 MICROgram(s) HFA Inhaler 2 Puff(s) Inhalation every 6 hours PRN Shortness of Breath and/or Wheezing  diphenhydrAMINE 25 milliGRAM(s) Oral every 6 hours PRN Pruritus  lanolin Ointment 1 Application(s) Topical every 6 hours PRN Sore Nipples  magnesium hydroxide Suspension 30 milliLiter(s) Oral two times a day PRN Constipation  oxyCODONE    IR 5 milliGRAM(s) Oral once PRN Moderate to Severe Pain (4-10)  oxyCODONE    IR 5 milliGRAM(s) Oral every 3 hours PRN Moderate to Severe Pain (4-10)  senna 1 Tablet(s) Oral daily PRN Constipation  simethicone 80 milliGRAM(s) Chew every 4 hours PRN Gas

## 2023-12-20 NOTE — PROGRESS NOTE ADULT - ATTENDING COMMENTS
I saw, evaluated, and examined the patient at the bedside. I discussed the patient’s case with the cardiology fellow and agree with fellow’s note, ROS findings, physical exam, assessment, and plan as documented in the fellow’s note, with the following addendum.    22 yo lady PMHx of superior mesenteric artery syndrome and cardiac arrest after previous delivery who was admitted for  contraction at 34 weeks found to have pre-eclampsia w/ severe features s/p  w/ the hospital course c/b new Afib that self converted to NSR. TTE with PFO vs ASD.    Assessment  1. Paroxysmal Afib  2. Preeclampsia  3. TTE with small secundum ASD vs PFO  4. Mild mitral regurgitation  5. Mild tricuspid regurgitation    Plan  1. Labetalol 400mg q8h for SBP goal <140  2. [id number] 1 so will not initiate AC for systemic embolization prevention of paroxysmal Afib  3. Will need outpatient BRADY for better evaluation of ASD vs PFO after done with breastfeeding (anesthesia) and resolution of pre-eclampsia  4. Will need surveillance TTE q3-5y for mild mitral and tricuspid regurgitation  5. Please arrange f/u with Dr. Latonia Skaggs (cardio-OB)     During non face-to-face time, I reviewed relevant portions of the patient’s medical record. During face-to-face time, I took a relevant history and examined the patient. I also explained differential diagnoses, relevant cardiac diagnoses, workup, and management plan, which required a high level of medical decision making. I answered all questions related to the patient's medical conditions.     Thank you for the consult    Bymarii Bhatti M.D.  CARDIOLOGY ATTENDING I saw, evaluated, and examined the patient at the bedside. I discussed the patient’s case with the cardiology fellow and agree with fellow’s note, ROS findings, physical exam, assessment, and plan as documented in the fellow’s note, with the following addendum.    24 yo lady PMHx of superior mesenteric artery syndrome and cardiac arrest after previous delivery who was admitted for  contraction at 34 weeks found to have pre-eclampsia w/ severe features s/p  w/ the hospital course c/b new Afib that self converted to NSR. TTE with PFO vs ASD.    Assessment  1. Paroxysmal Afib  2. Preeclampsia  3. TTE with small secundum ASD vs PFO  4. Mild mitral regurgitation  5. Mild tricuspid regurgitation    Plan  1. Labetalol 400mg q8h for SBP goal <140  2. [id number] 1 so will not initiate AC for systemic embolization prevention of paroxysmal Afib  3. Will need outpatient BARDY for better evaluation of ASD vs PFO after done with breastfeeding (anesthesia) and resolution of pre-eclampsia  4. Will need surveillance TTE q3-5y for mild mitral and tricuspid regurgitation  5. Please arrange f/u with Dr. Latonia Skaggs (cardio-OB)     During non face-to-face time, I reviewed relevant portions of the patient’s medical record. During face-to-face time, I took a relevant history and examined the patient. I also explained differential diagnoses, relevant cardiac diagnoses, workup, and management plan, which required a high level of medical decision making. I answered all questions related to the patient's medical conditions.     Thank you for the consult    Bymarii Bhatti M.D.  CARDIOLOGY ATTENDING

## 2023-12-20 NOTE — DISCHARGE NOTE OB - NS MD DC FALL RISK RISK
For information on Fall & Injury Prevention, visit: [email or web address] OR  [email or web address] OR  https://www.cdc.gov/steadi/patient.html For information on Fall & Injury Prevention, visit: [email or web address] OR  [email or web address] OR  https://www.cdc.gov/steadi/patient.html

## 2023-12-20 NOTE — DISCHARGE NOTE OB - MEDICATION SUMMARY - MEDICATIONS TO TAKE
I will START or STAY ON the medications listed below when I get home from the hospital:    ibuprofen 600 mg oral tablet  -- 1 tab(s) by mouth every 6 hours  -- Indication: For Pain    acetaminophen 325 mg oral tablet  -- 3 tab(s) by mouth every 6 hours  -- Indication: For Pain    albuterol 90 mcg/inh inhalation aerosol  -- 2 puff(s) inhaled every 6 hours As needed Shortness of Breath and/or Wheezing  -- Indication: For home medication

## 2023-12-20 NOTE — PROGRESS NOTE ADULT - ATTENDING COMMENTS
23F  with PMHx of Bipolar disorder, manic depression, ADHD, SMA syndrome, previous hx of cardiac arrest s/p delivery 2016, asthma (Flovent and PRN albuterol) at 34w0d, was diagnosed with gestational HTN at [facility] who presented for  contracts. Pt is now s/p  12/15. Course complicated by PEC with SF, requiring IV magnesium. Post op, pt was noted to be hypothermic to 93*F, as well as in new onset Afib. Cardiology consulted for management of Afib which converted to NSR spontaneously. Pt met SIRS criteria with lactic acidosis 2.3- .1.3 after hydration, and given iv abx with Ampicillin, Cefazolin and Azithromycin with pruritis ( no rash) and given Benadryl 50mg IM. Medicine consulted  () for management of post-op hypothermia/sepsis and hyponatremia.     Pt seen and examined with Dr. Busch.     # SIRS  # Leukocytosis   # lactic acidosis   - no clinical evidence of occult infection, UA, CXR, negative   - would observe off antibiotic  - HIV negative ( 23)  - no protein gap   - trend WBC 16K->13K-> 12.65K ( ), likely reactive leukocytosis   - lactate normalized 2.3-> 1.3    # hypothermia ( resolved)   - likely related to spinal anesthesia fro    - supportive care     # Hyponatremia ( resolved)   - urine lytes no evidence of SIADH  - encourage oral hydration   - trend Na 127-> 130 - >135 normalized     # New onset AFib  # gHTN  # SMA syndrome   - Cardiology f/u appreciated   - TTE reviewed   - Cards rec: switched Nifedipine XL to Labetalol and increased to 400mg po tid, hold for SBP<120  - EP consult appreciated, no need for ILR and f/u office for event monitoring outpt   - collaterals for the SMA syndrome ( pt follows with Vascular surgery at [facility])   - Refer to our Vascular surgery clinic since pt wants to transfer her care to [facility]     # Post-  pain   - c/w APAP and Ibuprofen  - will limit use of oxycodone and certainly will not send out on oxycodone     # Constipation  - laxative on Miralax 17gm daily and Bisacodyl 5mg qHS     # Disposition: per OB. Pt requesting prescription for her labetalol, Flovent MDI, albuterol and fleet enema ( Pt claims Miralax, and Dulcolax don't work for her)     POC as d/w OB team that pt is medically stable for discharge 23F  with PMHx of Bipolar disorder, manic depression, ADHD, SMA syndrome, previous hx of cardiac arrest s/p delivery 2016, asthma (Flovent and PRN albuterol) at 34w0d, was diagnosed with gestational HTN at [facility] who presented for  contracts. Pt is now s/p  12/15. Course complicated by PEC with SF, requiring IV magnesium. Post op, pt was noted to be hypothermic to 93*F, as well as in new onset Afib. Cardiology consulted for management of Afib which converted to NSR spontaneously. Pt met SIRS criteria with lactic acidosis 2.3- .1.3 after hydration, and given iv abx with Ampicillin, Cefazolin and Azithromycin with pruritis ( no rash) and given Benadryl 50mg IM. Medicine consulted  () for management of post-op hypothermia/sepsis and hyponatremia.     Pt seen and examined with Dr. Busch.     # SIRS  # Leukocytosis   # lactic acidosis   - no clinical evidence of occult infection, UA, CXR, negative   - would observe off antibiotic  - HIV negative ( 23)  - no protein gap   - trend WBC 16K->13K-> 12.65K ( ), likely reactive leukocytosis   - lactate normalized 2.3-> 1.3    # hypothermia ( resolved)   - likely related to spinal anesthesia fro    - supportive care     # Hyponatremia ( resolved)   - urine lytes no evidence of SIADH  - encourage oral hydration   - trend Na 127-> 130 - >135 normalized     # New onset AFib  # gHTN  # SMA syndrome   - Cardiology f/u appreciated   - TTE reviewed   - Cards rec: switched Nifedipine XL to Labetalol and increased to 400mg po tid, hold for SBP<120  - EP consult appreciated, no need for ILR and f/u office for event monitoring outpt   - collaterals for the SMA syndrome ( pt follows with Vascular surgery at [facility])   - Refer to our Vascular surgery clinic since pt wants to transfer her care to [facility]     # Post-  pain   - c/w APAP and Ibuprofen  - will limit use of oxycodone and certainly will not send out on oxycodone     # Constipation  - laxative on Miralax 17gm daily and Bisacodyl 5mg qHS     # Disposition: per OB. Pt requesting prescription for her labetalol, Flovent MDI, albuterol and fleet enema ( Pt claims Miralax, and Dulcolax don't work for her)     POC as d/w OB team that pt is medically stable for discharge

## 2023-12-20 NOTE — DISCHARGE NOTE OB - CARE PROVIDER_API CALL
[address] [place] [address],   [address] [place] [address]  Phone: [phone]  Fax: (   )    -  Follow Up Time:    [address] [place] [address],   [address] [place] [address]  Phone: [phone]  Fax: (   )    -  Follow Up Time:

## 2023-12-20 NOTE — PROVIDER CONTACT NOTE (CHANGE IN STATUS NOTIFICATION) - SITUATION
pt had an anaphylactic reaction to smelling shrimp. Pt's visitor ordered shrimp and patient had smelled it. She has had  prior allergic reaction to this. Rapid Response called at 16:20

## 2023-12-21 VITALS
DIASTOLIC BLOOD PRESSURE: 73 MMHG | OXYGEN SATURATION: 98 % | TEMPERATURE: 98 F | SYSTOLIC BLOOD PRESSURE: 148 MMHG | RESPIRATION RATE: 27 BRPM | HEART RATE: 82 BPM

## 2023-12-21 LAB
ALBUMIN SERPL ELPH-MCNC: 3.4 G/DL — SIGNIFICANT CHANGE UP (ref 3.3–5)
ALBUMIN SERPL ELPH-MCNC: 3.4 G/DL — SIGNIFICANT CHANGE UP (ref 3.3–5)
ALP SERPL-CCNC: 115 U/L — SIGNIFICANT CHANGE UP (ref 40–120)
ALP SERPL-CCNC: 115 U/L — SIGNIFICANT CHANGE UP (ref 40–120)
ALT FLD-CCNC: 26 U/L — SIGNIFICANT CHANGE UP (ref 10–45)
ALT FLD-CCNC: 26 U/L — SIGNIFICANT CHANGE UP (ref 10–45)
ANION GAP SERPL CALC-SCNC: 10 MMOL/L — SIGNIFICANT CHANGE UP (ref 5–17)
ANION GAP SERPL CALC-SCNC: 10 MMOL/L — SIGNIFICANT CHANGE UP (ref 5–17)
AST SERPL-CCNC: 32 U/L — SIGNIFICANT CHANGE UP (ref 10–40)
AST SERPL-CCNC: 32 U/L — SIGNIFICANT CHANGE UP (ref 10–40)
BASOPHILS # BLD AUTO: 0 K/UL — SIGNIFICANT CHANGE UP (ref 0–0.2)
BASOPHILS # BLD AUTO: 0 K/UL — SIGNIFICANT CHANGE UP (ref 0–0.2)
BASOPHILS NFR BLD AUTO: 0 % — SIGNIFICANT CHANGE UP (ref 0–2)
BASOPHILS NFR BLD AUTO: 0 % — SIGNIFICANT CHANGE UP (ref 0–2)
BILIRUB SERPL-MCNC: 0.5 MG/DL — SIGNIFICANT CHANGE UP (ref 0.2–1.2)
BILIRUB SERPL-MCNC: 0.5 MG/DL — SIGNIFICANT CHANGE UP (ref 0.2–1.2)
BUN SERPL-MCNC: 8 MG/DL — SIGNIFICANT CHANGE UP (ref 7–23)
BUN SERPL-MCNC: 8 MG/DL — SIGNIFICANT CHANGE UP (ref 7–23)
BURR CELLS BLD QL SMEAR: PRESENT — SIGNIFICANT CHANGE UP
BURR CELLS BLD QL SMEAR: PRESENT — SIGNIFICANT CHANGE UP
CALCIUM SERPL-MCNC: 9.3 MG/DL — SIGNIFICANT CHANGE UP (ref 8.4–10.5)
CALCIUM SERPL-MCNC: 9.3 MG/DL — SIGNIFICANT CHANGE UP (ref 8.4–10.5)
CHLORIDE SERPL-SCNC: 102 MMOL/L — SIGNIFICANT CHANGE UP (ref 96–108)
CHLORIDE SERPL-SCNC: 102 MMOL/L — SIGNIFICANT CHANGE UP (ref 96–108)
CO2 SERPL-SCNC: 22 MMOL/L — SIGNIFICANT CHANGE UP (ref 22–31)
CO2 SERPL-SCNC: 22 MMOL/L — SIGNIFICANT CHANGE UP (ref 22–31)
CREAT SERPL-MCNC: 0.57 MG/DL — SIGNIFICANT CHANGE UP (ref 0.5–1.3)
CREAT SERPL-MCNC: 0.57 MG/DL — SIGNIFICANT CHANGE UP (ref 0.5–1.3)
EGFR: 129 ML/MIN/1.73M2 — SIGNIFICANT CHANGE UP
EGFR: 129 ML/MIN/1.73M2 — SIGNIFICANT CHANGE UP
EOSINOPHIL # BLD AUTO: 0.07 K/UL — SIGNIFICANT CHANGE UP (ref 0–0.5)
EOSINOPHIL # BLD AUTO: 0.07 K/UL — SIGNIFICANT CHANGE UP (ref 0–0.5)
EOSINOPHIL NFR BLD AUTO: 0.9 % — SIGNIFICANT CHANGE UP (ref 0–6)
EOSINOPHIL NFR BLD AUTO: 0.9 % — SIGNIFICANT CHANGE UP (ref 0–6)
GLUCOSE SERPL-MCNC: 118 MG/DL — HIGH (ref 70–99)
GLUCOSE SERPL-MCNC: 118 MG/DL — HIGH (ref 70–99)
HCT VFR BLD CALC: 28.2 % — LOW (ref 34.5–45)
HCT VFR BLD CALC: 28.2 % — LOW (ref 34.5–45)
HGB BLD-MCNC: 9.7 G/DL — LOW (ref 11.5–15.5)
HGB BLD-MCNC: 9.7 G/DL — LOW (ref 11.5–15.5)
LYMPHOCYTES # BLD AUTO: 1.08 K/UL — SIGNIFICANT CHANGE UP (ref 1–3.3)
LYMPHOCYTES # BLD AUTO: 1.08 K/UL — SIGNIFICANT CHANGE UP (ref 1–3.3)
LYMPHOCYTES # BLD AUTO: 14.9 % — SIGNIFICANT CHANGE UP (ref 13–44)
LYMPHOCYTES # BLD AUTO: 14.9 % — SIGNIFICANT CHANGE UP (ref 13–44)
MAGNESIUM SERPL-MCNC: 2 MG/DL — SIGNIFICANT CHANGE UP (ref 1.6–2.6)
MAGNESIUM SERPL-MCNC: 2 MG/DL — SIGNIFICANT CHANGE UP (ref 1.6–2.6)
MANUAL SMEAR VERIFICATION: SIGNIFICANT CHANGE UP
MANUAL SMEAR VERIFICATION: SIGNIFICANT CHANGE UP
MCHC RBC-ENTMCNC: 30.7 PG — SIGNIFICANT CHANGE UP (ref 27–34)
MCHC RBC-ENTMCNC: 30.7 PG — SIGNIFICANT CHANGE UP (ref 27–34)
MCHC RBC-ENTMCNC: 34.4 GM/DL — SIGNIFICANT CHANGE UP (ref 32–36)
MCHC RBC-ENTMCNC: 34.4 GM/DL — SIGNIFICANT CHANGE UP (ref 32–36)
MCV RBC AUTO: 89.2 FL — SIGNIFICANT CHANGE UP (ref 80–100)
MCV RBC AUTO: 89.2 FL — SIGNIFICANT CHANGE UP (ref 80–100)
METAMYELOCYTES # FLD: 0.9 % — HIGH (ref 0–0)
METAMYELOCYTES # FLD: 0.9 % — HIGH (ref 0–0)
MONOCYTES # BLD AUTO: 0.19 K/UL — SIGNIFICANT CHANGE UP (ref 0–0.9)
MONOCYTES # BLD AUTO: 0.19 K/UL — SIGNIFICANT CHANGE UP (ref 0–0.9)
MONOCYTES NFR BLD AUTO: 2.6 % — SIGNIFICANT CHANGE UP (ref 2–14)
MONOCYTES NFR BLD AUTO: 2.6 % — SIGNIFICANT CHANGE UP (ref 2–14)
MYELOCYTES NFR BLD: 0.9 % — HIGH (ref 0–0)
MYELOCYTES NFR BLD: 0.9 % — HIGH (ref 0–0)
NEUTROPHILS # BLD AUTO: 5.78 K/UL — SIGNIFICANT CHANGE UP (ref 1.8–7.4)
NEUTROPHILS # BLD AUTO: 5.78 K/UL — SIGNIFICANT CHANGE UP (ref 1.8–7.4)
NEUTROPHILS NFR BLD AUTO: 79.8 % — HIGH (ref 43–77)
NEUTROPHILS NFR BLD AUTO: 79.8 % — HIGH (ref 43–77)
PHOSPHATE SERPL-MCNC: 4 MG/DL — SIGNIFICANT CHANGE UP (ref 2.5–4.5)
PHOSPHATE SERPL-MCNC: 4 MG/DL — SIGNIFICANT CHANGE UP (ref 2.5–4.5)
PLAT MORPH BLD: NORMAL — SIGNIFICANT CHANGE UP
PLAT MORPH BLD: NORMAL — SIGNIFICANT CHANGE UP
PLATELET # BLD AUTO: 344 K/UL — SIGNIFICANT CHANGE UP (ref 150–400)
PLATELET # BLD AUTO: 344 K/UL — SIGNIFICANT CHANGE UP (ref 150–400)
POIKILOCYTOSIS BLD QL AUTO: SLIGHT — SIGNIFICANT CHANGE UP
POIKILOCYTOSIS BLD QL AUTO: SLIGHT — SIGNIFICANT CHANGE UP
POLYCHROMASIA BLD QL SMEAR: SLIGHT — SIGNIFICANT CHANGE UP
POLYCHROMASIA BLD QL SMEAR: SLIGHT — SIGNIFICANT CHANGE UP
POTASSIUM SERPL-MCNC: 4.1 MMOL/L — SIGNIFICANT CHANGE UP (ref 3.5–5.3)
POTASSIUM SERPL-MCNC: 4.1 MMOL/L — SIGNIFICANT CHANGE UP (ref 3.5–5.3)
POTASSIUM SERPL-SCNC: 4.1 MMOL/L — SIGNIFICANT CHANGE UP (ref 3.5–5.3)
POTASSIUM SERPL-SCNC: 4.1 MMOL/L — SIGNIFICANT CHANGE UP (ref 3.5–5.3)
PROT SERPL-MCNC: 6.6 G/DL — SIGNIFICANT CHANGE UP (ref 6–8.3)
PROT SERPL-MCNC: 6.6 G/DL — SIGNIFICANT CHANGE UP (ref 6–8.3)
RBC # BLD: 3.16 M/UL — LOW (ref 3.8–5.2)
RBC # BLD: 3.16 M/UL — LOW (ref 3.8–5.2)
RBC # FLD: 12.8 % — SIGNIFICANT CHANGE UP (ref 10.3–14.5)
RBC # FLD: 12.8 % — SIGNIFICANT CHANGE UP (ref 10.3–14.5)
RBC BLD AUTO: ABNORMAL
RBC BLD AUTO: ABNORMAL
SODIUM SERPL-SCNC: 134 MMOL/L — LOW (ref 135–145)
SODIUM SERPL-SCNC: 134 MMOL/L — LOW (ref 135–145)
WBC # BLD: 7.24 K/UL — SIGNIFICANT CHANGE UP (ref 3.8–10.5)
WBC # BLD: 7.24 K/UL — SIGNIFICANT CHANGE UP (ref 3.8–10.5)
WBC # FLD AUTO: 7.24 K/UL — SIGNIFICANT CHANGE UP (ref 3.8–10.5)
WBC # FLD AUTO: 7.24 K/UL — SIGNIFICANT CHANGE UP (ref 3.8–10.5)

## 2023-12-21 PROCEDURE — 85027 COMPLETE CBC AUTOMATED: CPT

## 2023-12-21 PROCEDURE — 84300 ASSAY OF URINE SODIUM: CPT

## 2023-12-21 PROCEDURE — 82570 ASSAY OF URINE CREATININE: CPT

## 2023-12-21 PROCEDURE — 99233 SBSQ HOSP IP/OBS HIGH 50: CPT | Mod: GC

## 2023-12-21 PROCEDURE — 83690 ASSAY OF LIPASE: CPT

## 2023-12-21 PROCEDURE — 84550 ASSAY OF BLOOD/URIC ACID: CPT

## 2023-12-21 PROCEDURE — 83935 ASSAY OF URINE OSMOLALITY: CPT

## 2023-12-21 PROCEDURE — 83930 ASSAY OF BLOOD OSMOLALITY: CPT

## 2023-12-21 PROCEDURE — 93970 EXTREMITY STUDY: CPT

## 2023-12-21 PROCEDURE — 84295 ASSAY OF SERUM SODIUM: CPT

## 2023-12-21 PROCEDURE — 80048 BASIC METABOLIC PNL TOTAL CA: CPT

## 2023-12-21 PROCEDURE — 84132 ASSAY OF SERUM POTASSIUM: CPT

## 2023-12-21 PROCEDURE — 85025 COMPLETE CBC W/AUTO DIFF WBC: CPT

## 2023-12-21 PROCEDURE — 86880 COOMBS TEST DIRECT: CPT

## 2023-12-21 PROCEDURE — 82553 CREATINE MB FRACTION: CPT

## 2023-12-21 PROCEDURE — 94640 AIRWAY INHALATION TREATMENT: CPT

## 2023-12-21 PROCEDURE — 84133 ASSAY OF URINE POTASSIUM: CPT

## 2023-12-21 PROCEDURE — 93005 ELECTROCARDIOGRAM TRACING: CPT

## 2023-12-21 PROCEDURE — 86780 TREPONEMA PALLIDUM: CPT

## 2023-12-21 PROCEDURE — 80053 COMPREHEN METABOLIC PANEL: CPT

## 2023-12-21 PROCEDURE — 83615 LACTATE (LD) (LDH) ENZYME: CPT

## 2023-12-21 PROCEDURE — 83735 ASSAY OF MAGNESIUM: CPT

## 2023-12-21 PROCEDURE — 71045 X-RAY EXAM CHEST 1 VIEW: CPT

## 2023-12-21 PROCEDURE — 87040 BLOOD CULTURE FOR BACTERIA: CPT

## 2023-12-21 PROCEDURE — 93306 TTE W/DOPPLER COMPLETE: CPT

## 2023-12-21 PROCEDURE — 82550 ASSAY OF CK (CPK): CPT

## 2023-12-21 PROCEDURE — 81001 URINALYSIS AUTO W/SCOPE: CPT

## 2023-12-21 PROCEDURE — 82436 ASSAY OF URINE CHLORIDE: CPT

## 2023-12-21 PROCEDURE — 90715 TDAP VACCINE 7 YRS/> IM: CPT

## 2023-12-21 PROCEDURE — 82803 BLOOD GASES ANY COMBINATION: CPT

## 2023-12-21 PROCEDURE — 86870 RBC ANTIBODY IDENTIFICATION: CPT

## 2023-12-21 PROCEDURE — 99238 HOSP IP/OBS DSCHRG MGMT 30/<: CPT

## 2023-12-21 PROCEDURE — 82962 GLUCOSE BLOOD TEST: CPT

## 2023-12-21 PROCEDURE — 83880 ASSAY OF NATRIURETIC PEPTIDE: CPT

## 2023-12-21 PROCEDURE — 86901 BLOOD TYPING SEROLOGIC RH(D): CPT

## 2023-12-21 PROCEDURE — 84484 ASSAY OF TROPONIN QUANT: CPT

## 2023-12-21 PROCEDURE — 84156 ASSAY OF PROTEIN URINE: CPT

## 2023-12-21 PROCEDURE — 86850 RBC ANTIBODY SCREEN: CPT

## 2023-12-21 PROCEDURE — 85384 FIBRINOGEN ACTIVITY: CPT

## 2023-12-21 PROCEDURE — 36415 COLL VENOUS BLD VENIPUNCTURE: CPT

## 2023-12-21 PROCEDURE — 83605 ASSAY OF LACTIC ACID: CPT

## 2023-12-21 PROCEDURE — 80307 DRUG TEST PRSMV CHEM ANLYZR: CPT

## 2023-12-21 PROCEDURE — 82330 ASSAY OF CALCIUM: CPT

## 2023-12-21 PROCEDURE — 87086 URINE CULTURE/COLONY COUNT: CPT

## 2023-12-21 PROCEDURE — 86905 BLOOD TYPING RBC ANTIGENS: CPT

## 2023-12-21 PROCEDURE — 59050 FETAL MONITOR W/REPORT: CPT

## 2023-12-21 PROCEDURE — 84100 ASSAY OF PHOSPHORUS: CPT

## 2023-12-21 PROCEDURE — 86900 BLOOD TYPING SEROLOGIC ABO: CPT

## 2023-12-21 PROCEDURE — 88307 TISSUE EXAM BY PATHOLOGIST: CPT

## 2023-12-21 PROCEDURE — 0225U NFCT DS DNA&RNA 21 SARSCOV2: CPT

## 2023-12-21 PROCEDURE — 85730 THROMBOPLASTIN TIME PARTIAL: CPT

## 2023-12-21 RX ORDER — IBUPROFEN 200 MG
1 TABLET ORAL
Qty: 120 | Refills: 0
Start: 2023-12-21 | End: 2024-01-19

## 2023-12-21 RX ORDER — POLYETHYLENE GLYCOL 3350 17 G/17G
17 POWDER, FOR SOLUTION ORAL
Qty: 238 | Refills: 0
Start: 2023-12-21 | End: 2024-01-03

## 2023-12-21 RX ORDER — EPINEPHRINE 0.3 MG/.3ML
0.3 INJECTION INTRAMUSCULAR; SUBCUTANEOUS
Qty: 1 | Refills: 0
Start: 2023-12-21

## 2023-12-21 RX ORDER — HYDRALAZINE HCL 50 MG
10 TABLET ORAL ONCE
Refills: 0 | Status: DISCONTINUED | OUTPATIENT
Start: 2023-12-21 | End: 2023-12-21

## 2023-12-21 RX ORDER — ACETAMINOPHEN 500 MG
3 TABLET ORAL
Qty: 360 | Refills: 0
Start: 2023-12-21 | End: 2024-01-19

## 2023-12-21 RX ORDER — HYDROMORPHONE HYDROCHLORIDE 2 MG/ML
1 INJECTION INTRAMUSCULAR; INTRAVENOUS; SUBCUTANEOUS ONCE
Refills: 0 | Status: DISCONTINUED | OUTPATIENT
Start: 2023-12-21 | End: 2023-12-21

## 2023-12-21 RX ORDER — LABETALOL HCL 100 MG
2 TABLET ORAL
Qty: 180 | Refills: 0
Start: 2023-12-21 | End: 2024-01-19

## 2023-12-21 RX ORDER — HYDROMORPHONE HYDROCHLORIDE 2 MG/ML
2 INJECTION INTRAMUSCULAR; INTRAVENOUS; SUBCUTANEOUS ONCE
Refills: 0 | Status: DISCONTINUED | OUTPATIENT
Start: 2023-12-21 | End: 2023-12-21

## 2023-12-21 RX ORDER — SENNA PLUS 8.6 MG/1
1 TABLET ORAL
Qty: 14 | Refills: 0
Start: 2023-12-21 | End: 2024-01-03

## 2023-12-21 RX ORDER — HYDRALAZINE HCL 50 MG
10 TABLET ORAL ONCE
Refills: 0 | Status: COMPLETED | OUTPATIENT
Start: 2023-12-21 | End: 2023-12-21

## 2023-12-21 RX ORDER — ALBUTEROL 90 UG/1
2 AEROSOL, METERED ORAL
Qty: 1 | Refills: 0
Start: 2023-12-21

## 2023-12-21 RX ADMIN — Medication 975 MILLIGRAM(S): at 14:26

## 2023-12-21 RX ADMIN — ENOXAPARIN SODIUM 40 MILLIGRAM(S): 100 INJECTION SUBCUTANEOUS at 07:46

## 2023-12-21 RX ADMIN — Medication 40 MILLIGRAM(S): at 09:55

## 2023-12-21 RX ADMIN — MOMETASONE FUROATE 2 PUFF(S): 220 INHALANT RESPIRATORY (INHALATION) at 12:35

## 2023-12-21 RX ADMIN — Medication 600 MILLIGRAM(S): at 06:01

## 2023-12-21 RX ADMIN — Medication 400 MILLIGRAM(S): at 12:40

## 2023-12-21 RX ADMIN — OXYCODONE HYDROCHLORIDE 5 MILLIGRAM(S): 5 TABLET ORAL at 06:09

## 2023-12-21 RX ADMIN — SENNA PLUS 1 TABLET(S): 8.6 TABLET ORAL at 12:41

## 2023-12-21 RX ADMIN — Medication 10 MILLIGRAM(S): at 01:30

## 2023-12-21 RX ADMIN — Medication 975 MILLIGRAM(S): at 14:50

## 2023-12-21 RX ADMIN — Medication 975 MILLIGRAM(S): at 09:54

## 2023-12-21 RX ADMIN — Medication 600 MILLIGRAM(S): at 12:41

## 2023-12-21 RX ADMIN — CHLORHEXIDINE GLUCONATE 1 APPLICATION(S): 213 SOLUTION TOPICAL at 06:03

## 2023-12-21 RX ADMIN — HYDROMORPHONE HYDROCHLORIDE 1 MILLIGRAM(S): 2 INJECTION INTRAMUSCULAR; INTRAVENOUS; SUBCUTANEOUS at 00:00

## 2023-12-21 RX ADMIN — Medication 600 MILLIGRAM(S): at 13:00

## 2023-12-21 RX ADMIN — Medication 975 MILLIGRAM(S): at 10:00

## 2023-12-21 RX ADMIN — Medication 600 MILLIGRAM(S): at 07:00

## 2023-12-21 RX ADMIN — HYDROMORPHONE HYDROCHLORIDE 1 MILLIGRAM(S): 2 INJECTION INTRAMUSCULAR; INTRAVENOUS; SUBCUTANEOUS at 04:00

## 2023-12-21 RX ADMIN — OXYCODONE HYDROCHLORIDE 5 MILLIGRAM(S): 5 TABLET ORAL at 07:10

## 2023-12-21 RX ADMIN — Medication 400 MILLIGRAM(S): at 06:01

## 2023-12-21 RX ADMIN — POLYETHYLENE GLYCOL 3350 17 GRAM(S): 17 POWDER, FOR SOLUTION ORAL at 12:41

## 2023-12-21 NOTE — PROGRESS NOTE ADULT - ASSESSMENT
22 yo F with PMH asthma, superior mesenteric artery syndrome, cardiac arrest after previous delivery, who presented with  contractions at 34 weeks, found to have gHTN and PEC w/ SF, now s/p . Noted to be in AFib during labor so cardiology consulted.    Review of Studies:  EKG 12/15 9AM: Afib RVR, rate 101  EKG  12/15 10pm: NSR, rate 81, no ischemic changes  TTE 23: Normal biventricular size/function. Mild LVH. Mildly dilated LA. PFO vs small ASD. Mild MR. Mild TR. PASP 45. Trivial pericardial effusion.     #New onset atrial fibrillation  - EKG noted with Afib during labor/prior to OR and now returned to NSR - likely paroxysmal  - CHADSVASC 1 and Afib duration < 24h. Would not start AC at this time.   - replete electrolytes for goal K>4, Mg>2  - Outpatient EP follow-up for event monitor    #gHTN  -Continue labetolol 400mg TID    #PFO vs ASD  - Outpatient follow-up    #SMA syndrome  - pt can f/u with vascular    Follow up with Dr. Latonia Skaggs as an outpatient.

## 2023-12-21 NOTE — DISCHARGE NOTE PROVIDER - CARE PROVIDERS DIRECT ADDRESSES
,[email or web address]

## 2023-12-21 NOTE — DISCHARGE NOTE PROVIDER - NSDCMRMEDTOKEN_GEN_ALL_CORE_FT
acetaminophen 325 mg oral tablet: 3 tab(s) orally every 6 hours  albuterol 90 mcg/inh inhalation aerosol: 2 puff(s) inhaled every 6 hours As needed Shortness of Breath and/or Wheezing  EpiPen 2-Max 0.3 mg injectable kit: 0.3 milligram(s) intramuscularly once a day as needed for anaphylaxis  ibuprofen 600 mg oral tablet: 1 tab(s) orally every 6 hours  labetalol 200 mg oral tablet: 2 tab(s) orally every 8 hours  predniSONE 20 mg oral tablet: 2 tab(s) orally once a day   acetaminophen 325 mg oral tablet: 3 tab(s) orally every 6 hours as needed for  mild pain  Albuterol (Eqv-ProAir HFA) 90 mcg/inh inhalation aerosol: 2 puff(s) inhaled 4 times a day as needed for  shortness of breath and/or wheezing  albuterol 90 mcg/inh inhalation aerosol: 2 puff(s) inhaled every 6 hours As needed Shortness of Breath and/or Wheezing  EpiPen 2-Max 0.3 mg injectable kit: 0.3 milligram(s) intramuscularly once a day as needed for anaphylaxis  ibuprofen 600 mg oral tablet: 1 tab(s) orally every 6 hours as needed for  moderate pain  labetalol 200 mg oral tablet: 2 tab(s) orally every 8 hours  polyethylene glycol 3350 oral powder for reconstitution: 17 gram(s) orally once a day as needed for  constipation  predniSONE 20 mg oral tablet: 2 tab(s) orally once a day  senna leaf extract oral tablet: 1 tab(s) orally once a day as needed for Constipation

## 2023-12-21 NOTE — PROGRESS NOTE ADULT - ATTENDING COMMENTS
24 yo lady PMHx of superior mesenteric artery syndrome and cardiac arrest after previous delivery who was admitted for  contraction at 34 weeks found to have pre-eclampsia w/ severe features s/p  w/ the hospital course c/b new Afib that self converted to NSR. TTE with PFO vs ASD.    Assessment  1. Paroxysmal Afib  2. Preeclampsia  3. TTE with small secundum ASD vs PFO  4. Mild mitral regurgitation  5. Mild tricuspid regurgitation    Plan  1. Labetalol 400mg q8h for SBP goal <140  2. [id number] 1 so will not initiate AC for systemic embolization prevention of paroxysmal Afib  3. May need outpatient BRADY for better evaluation of ASD vs PFO after done with breastfeeding (anesthesia) and resolution of pre-eclampsia if PHTN does not resolve and RV size does not normalize.  4. Will need surveillance TTE q3-5y for mild mitral and tricuspid regurgitation  5. Please arrange f/u with Dr. Latonia Skaggs (cardio-OB)     During non face-to-face time, I reviewed relevant portions of the patient’s medical record. During face-to-face time, I took a relevant history and examined the patient. I also explained differential diagnoses, relevant cardiac diagnoses, workup, and management plan, which required a high level of medical decision making. I answered all questions related to the patient's medical conditions.     Thank you for the consult 24 yo lady PMHx of superior mesenteric artery syndrome and cardiac arrest after previous delivery who was admitted for  contraction at 34 weeks found to have pre-eclampsia w/ severe features s/p  w/ the hospital course c/b new Afib that self converted to NSR. TTE with PFO vs ASD.    Assessment  1. Paroxysmal Afib  2. Preeclampsia  3. TTE with small secundum ASD vs PFO  4. Mild mitral regurgitation  5. Mild tricuspid regurgitation    Plan  1. Labetalol 400mg q8h for SBP goal <140  2. [id number] 1 so will not initiate AC for systemic embolization prevention of paroxysmal Afib  3. May need outpatient BRADY for better evaluation of ASD vs PFO after done with breastfeeding (anesthesia) and resolution of pre-eclampsia if PHTN does not resolve and RV size does not normalize.  4. Will need surveillance TTE q3-5y for mild mitral and tricuspid regurgitation  5. Please arrange f/u with Dr. Latonia Skaggs (cardio-OB)     During non face-to-face time, I reviewed relevant portions of the patient’s medical record. During face-to-face time, I took a relevant history and examined the patient. I also explained differential diagnoses, relevant cardiac diagnoses, workup, and management plan, which required a high level of medical decision making. I answered all questions related to the patient's medical conditions.     Thank you for the consult

## 2023-12-21 NOTE — PROGRESS NOTE ADULT - SUBJECTIVE AND OBJECTIVE BOX
INTERVAL EVENTS:  -Patient had suspected anaphylaxis from shrimp which was brought in by a family member. Moved to MICU for monitoring afterwards.   -Currently asymptomatic    PAST MEDICAL & SURGICAL HISTORY:  ADHD    Asthma    Bipolar disorder    Manic depression    SMAS (superior mesenteric artery syndrome)    No significant past surgical history    S/P D&C (status post dilation and curettage)        MEDICATIONS  (STANDING):  acetaminophen     Tablet .. 975 milliGRAM(s) Oral <User Schedule>  bisacodyl 5 milliGRAM(s) Oral at bedtime  chlorhexidine 2% Cloths 1 Application(s) Topical <User Schedule>  enoxaparin Injectable 40 milliGRAM(s) SubCutaneous every 24 hours  ibuprofen  Tablet. 600 milliGRAM(s) Oral every 6 hours  labetalol 400 milliGRAM(s) Oral every 8 hours  methylPREDNISolone sodium succinate Injectable 40 milliGRAM(s) IV Push every 12 hours  mometasone 220 MICROgram(s) Inhaler 2 Puff(s) Inhalation daily  oxytocin Infusion 333.333 milliUNIT(s)/Min (1000 mL/Hr) IV Continuous <Continuous>  polyethylene glycol 3350 17 Gram(s) Oral daily    MEDICATIONS  (PRN):  diphenhydrAMINE 25 milliGRAM(s) Oral every 6 hours PRN Pruritus  EPINEPHrine     1 mG/mL Injectable 0.3 milliGRAM(s) IntraMuscular once PRN ANAPHYLAXSIS  lanolin Ointment 1 Application(s) Topical every 6 hours PRN Sore Nipples  magnesium hydroxide Suspension 30 milliLiter(s) Oral two times a day PRN Constipation  oxyCODONE    IR 5 milliGRAM(s) Oral once PRN Moderate to Severe Pain (4-10)  oxyCODONE    IR 5 milliGRAM(s) Oral every 3 hours PRN Moderate to Severe Pain (4-10)  senna 1 Tablet(s) Oral daily PRN Constipation  simethicone 80 milliGRAM(s) Chew every 4 hours PRN Gas    T(F): 98.3 (12-21-23 @ 14:00), Max: 98.5 (12-21-23 @ 01:02)  HR: 92 (12-21-23 @ 13:00) (67 - 96)  BP: 159/88 (12-21-23 @ 13:00) (136/83 - 174/104)  BP(mean): 115 (12-21-23 @ 13:00) (100 - 134)  ABP: --  ABP(mean): --  RR: 26 (12-21-23 @ 13:00) (13 - 30)  SpO2: 98% (12-21-23 @ 10:00) (98% - 100%)    I/O Detail 24H    20 Dec 2023 07:01  -  21 Dec 2023 07:00  --------------------------------------------------------  IN:  Total IN: 0 mL    OUT:    Voided (mL): 1900 mL  Total OUT: 1900 mL    Total NET: -1900 mL      21 Dec 2023 07:01  -  21 Dec 2023 15:00  --------------------------------------------------------  IN:  Total IN: 0 mL    OUT:    Voided (mL): 400 mL  Total OUT: 400 mL    Total NET: -400 mL          PHYSICAL EXAM:  GEN: NAD  HEENT: EOMI   RESP: CTA b/l  CV: RRR. Normal S1/S2. No m/r/g.  ABD: soft, non-distended  EXT: No edema   NEURO: alert and attentive    LABS:  CBC 12-21-23 @ 05:30                        9.7    7.24  )-----------( 344                   28.2       Hgb trend: 9.7 <-- , 9.9 <--   WBC trend: 7.24 <-- , 10.95 <--       CMP 12-21-23 @ 05:30    134<L>  |  102  |  8   ----------------------------<  118<H>  4.1   |  22  |  0.57    Ca    9.3      12-21-23 @ 05:30  Phos  4.0     12-21  Mg     2.0     12-21    TPro  6.6  /  Alb  3.4  /  TBili  0.5  /  DBili  x   /  AST  32  /  ALT  26  /  AlkPhos  115  12-21      Serum Cr trend: 0.57 <-- , 0.64 <--     PTT - ( 20 Dec 2023 19:49 ):30.0 sec    Cardiac Markers           STUDIES:

## 2023-12-21 NOTE — PROGRESS NOTE ADULT - PROVIDER SPECIALTY LIST ADULT
Cardiology
Internal Medicine
MICU
OB
Cardiology
MICU
OB
Hospitalist
OB
OB

## 2023-12-21 NOTE — DIETITIAN INITIAL EVALUATION ADULT - PERTINENT LABORATORY DATA
12-21    134<L>  |  102  |  8   ----------------------------<  118<H>  4.1   |  22  |  0.57    Ca    9.3      21 Dec 2023 05:30  Phos  4.0     12-21  Mg     2.0     12-21    TPro  6.6  /  Alb  3.4  /  TBili  0.5  /  DBili  x   /  AST  32  /  ALT  26  /  AlkPhos  115  12-21

## 2023-12-21 NOTE — PROGRESS NOTE ADULT - REASON FOR ADMISSION
pregnancy
PRE ECLAMPSIA WITH SEVERE FEATURES
pre eclampsia , hypothermia, hyponatremia
pregnancy

## 2023-12-21 NOTE — DISCHARGE NOTE PROVIDER - CARE PROVIDER_API CALL
Latonia Skaggs  Cardiovascular Disease  [address] [place] [id number]  Phone: [phone]  Fax: [phone]  Follow Up Time:    Latonia Skaggs  Cardiovascular Disease  [address] [place] [address]  Phone: [phone]  Fax: [phone]  Follow Up Time:

## 2023-12-21 NOTE — DISCHARGE NOTE NURSING/CASE MANAGEMENT/SOCIAL WORK - NSDCPEFALRISK_GEN_ALL_CORE
For information on Fall & Injury Prevention, visit: [email or web address] OR  [email or web address] OR  https://www.cdc.gov/steadi/patient.html For information on Fall & Injury Prevention, visit: [email or web address]://[email or web address].[email or web address]-mobility OR  [email or web address]://[email or web address].[email or web address]/[email or web address]-[email or web address]-[email or web address]-avoid-injury OR  https://www.cdc.gov/steadi/patient.html

## 2023-12-21 NOTE — DISCHARGE NOTE PROVIDER - NSDCCPCAREPLAN_GEN_ALL_CORE_FT
PRINCIPAL DISCHARGE DIAGNOSIS  Diagnosis: Pregnancy, delivered  Assessment and Plan of Treatment: Please follow up with your OBGYN      SECONDARY DISCHARGE DIAGNOSES  Diagnosis: Paroxysmal atrial fibrillation  Assessment and Plan of Treatment: Please follow up with Dr. Latonia Skaggs for further workup.    Diagnosis: At risk for anaphylaxis  Assessment and Plan of Treatment: Please avoid being exposed to allergens such as shrimp.    Diagnosis: Preeclampsia  Assessment and Plan of Treatment: Please continue to take labetalol 400mg three times a day.    Diagnosis: Acute asthma exacerbation  Assessment and Plan of Treatment: Please continue to take your steroid medication.     PRINCIPAL DISCHARGE DIAGNOSIS  Diagnosis: Pregnancy, delivered  Assessment and Plan of Treatment: You were admitted to [facility] from 12/14/23 to 12/21/23. Please follow up with your OBGYN. Continue to take tylenol and ibuprofen as needed.      SECONDARY DISCHARGE DIAGNOSES  Diagnosis: Paroxysmal atrial fibrillation  Assessment and Plan of Treatment: Please follow up with Dr. Latonia Skaggs for further workup.    Diagnosis: At risk for anaphylaxis  Assessment and Plan of Treatment: Please avoid being exposed to allergens such as shrimp.    Diagnosis: Preeclampsia  Assessment and Plan of Treatment: Please continue to take labetalol 400mg three times a day.    Diagnosis: Acute asthma exacerbation  Assessment and Plan of Treatment: Please continue to take your steroid medication.     PRINCIPAL DISCHARGE DIAGNOSIS  Diagnosis: Pregnancy, delivered  Assessment and Plan of Treatment: You were admitted to [facility] from 12/14/23 to 12/21/23. Please follow up with your OBGYN. Continue to take tylenol and ibuprofen as needed.      SECONDARY DISCHARGE DIAGNOSES  Diagnosis: Paroxysmal atrial fibrillation  Assessment and Plan of Treatment: Please follow up with Dr. Latonia Skaggs for further workup.    Diagnosis: At risk for anaphylaxis  Assessment and Plan of Treatment: Please avoid being exposed to allergens such as shrimp.    Diagnosis: Preeclampsia  Assessment and Plan of Treatment: Please continue to take labetalol 400mg three times a day.    Diagnosis: Acute asthma exacerbation  Assessment and Plan of Treatment: Please continue to take your steroid medication.

## 2023-12-21 NOTE — DIETITIAN INITIAL EVALUATION ADULT - ADD RECOMMEND
1. Continue with regular diet. Made note in  system to not allow guests to order shellfish.  2. Encourage pt to meet nutritional needs as able   3. Monitor labs: electrolytes, cmp  4. Monitor weights   5. Pain and bowel regimen per team   6. Will continue to assess/honor food preferences as able  7. Monitor adherence to diet education

## 2023-12-21 NOTE — PROGRESS NOTE ADULT - ATTENDING COMMENTS
new allergy developed durign pregancy  significant wheeze yesterday on exposure, no stridor  no hemodynamic instability  This AM reports she is back to baseline, no wheeze oropharyngeal swelling, stridor or wheeze  she is anxious to get home to take care of her child and to leave the hospital  Discharge today with epi pen and steroid taper and outpatient follow up

## 2023-12-21 NOTE — DISCHARGE NOTE NURSING/CASE MANAGEMENT/SOCIAL WORK - PATIENT PORTAL LINK FT
You can access the FollowMyHealth Patient Portal offered by [facility] by registering at the following website: [email or web address]. By joining [facility]’s FollowMyHealth portal, you will also be able to view your health information using other applications (apps) compatible with our system. You can access the FollowMyHealth Patient Portal offered by [facility] by registering at the following website: [email or web address]. By joining [facility]’s FollowMyHealth portal, you will also be able to view your health information using other applications (apps) compatible with our system.

## 2023-12-21 NOTE — PROGRESS NOTE ADULT - ASSESSMENT
A/P: 22yo  s/p pCS at 34w0d d/t NRFHT after IOL for PEC w/ SF now complicated by anaphylactic reaction yesterday  transfered to MICU for epinephrine drip and albuterol nebulizer.  -  Pain: PO motrin q6hrs, tylenol q8hrs, oxycodone for severe pain PRN  - Preeclampsia with severe features: normotensive to mild range on labetalol 400mg TID, continue to monitor  -  Post-operatively labs: post-op Hgb 9.7, hemodynamically stable, no symptoms of anemia   - CV/Pulm: s/p epi drip on methylprednisolone 40mg bid, albuterol, and flovent now breathing comfortable on room air, vitals stable. To follow up with cardiology for afib, pulmonary HTN as outpatient   -  GI: tolerating regular diet, passing gas  -  : s/p woodard , urinating without difficulty  -  DVT prophylaxis: encouraged increased ambulation, SCDs, SQL  -  Dispo: per MICU A/P: 24yo  s/p pCS at 34w0d d/t NRFHT after IOL for PEC w/ SF now complicated by anaphylactic reaction yesterday  transfered to MICU for epinephrine drip and albuterol nebulizer.  -  Pain: PO motrin q6hrs, tylenol q8hrs, oxycodone for severe pain PRN  - Preeclampsia with severe features: normotensive to mild range on labetalol 400mg TID, continue to monitor  -  Post-operatively labs: post-op Hgb 9.7, hemodynamically stable, no symptoms of anemia   - CV/Pulm: s/p epi drip on methylprednisolone 40mg bid, albuterol, and flovent now breathing comfortable on room air, vitals stable. To follow up with cardiology for afib, pulmonary HTN as outpatient   -  GI: tolerating regular diet, passing gas  -  : s/p woodard , urinating without difficulty  -  DVT prophylaxis: encouraged increased ambulation, SCDs, SQL  -  Dispo: per MICU A/P: 24yo  s/p pCS at 34w0d d/t NRFHT after IOL for PEC w/ SF now complicated by anaphylactic reaction yesterday  transfered to MICU for epinephrine drip and albuterol nebulizer.  -  Pain: PO motrin q6hrs, tylenol q8hrs, oxycodone for severe pain PRN  - Preeclampsia with severe features: normotensive to high mild range on labetalol 400mg TID, continue to monitor, appreciated cardiology recs  -  Post-operatively labs: post-op Hgb 9.7, hemodynamically stable, no symptoms of anemia   - CV/Pulm: s/p epi drip on methylprednisolone 40mg bid, albuterol, and flovent now breathing comfortable on room air, vitals stable. To follow up with cardiology for afib, pulmonary HTN as outpatient   -  GI: tolerating regular diet, passing gas  -  : s/p woodard , urinating without difficulty  -  DVT prophylaxis: encouraged increased ambulation, SCDs, SQL  -  Dispo: per MICU A/P: 22yo  s/p pCS at 34w0d d/t NRFHT after IOL for PEC w/ SF now complicated by anaphylactic reaction yesterday  transfered to MICU for epinephrine drip and albuterol nebulizer.  -  Pain: PO motrin q6hrs, tylenol q8hrs, oxycodone for severe pain PRN  - Preeclampsia with severe features: normotensive to high mild range on labetalol 400mg TID, continue to monitor, appreciated cardiology recs  -  Post-operatively labs: post-op Hgb 9.7, hemodynamically stable, no symptoms of anemia   - CV/Pulm: s/p epi drip on methylprednisolone 40mg bid, albuterol, and flovent now breathing comfortable on room air, vitals stable. To follow up with cardiology for afib, pulmonary HTN as outpatient   -  GI: tolerating regular diet, passing gas  -  : s/p woodard , urinating without difficulty  -  DVT prophylaxis: encouraged increased ambulation, SCDs, SQL  -  Dispo: per MICU

## 2023-12-21 NOTE — DISCHARGE NOTE PROVIDER - HOSPITAL COURSE
#Discharge: do not delete    Patient is __ yo M/F with past medical history of _____ presented with _____, found to have _____ (one liner)    Hospital course (by problem):     Patient was discharged to: (home/[facility]/acute rehab/hospice, etc, and with what services – home health PT/RN? Home O2?)    New medications:   Changes to old medications:  Medications that were stopped:    Items to follow up as outpatient:    Physical exam at the time of discharge:       #Discharge: do not delete    22 yo  PMH PMHx of Bipolar disorder, manic depression, ADHD, SMA syndrome, previous hx of cardiac arrest s/p delivery 2016, asthma at 34w0d presented with  contractions. s/p . Rapid response team called on  for shortness of breath and difficulty breathing after a family member was eating a food product with shrimp(patient with known allergy to shellfish). Transferred to MICU for management of anaphalyxis/asthma exacerbation.     Hospital course (by problem):     #Asthma exacerbation  - c/w albuterol PRN  - c/w prednisone 40mg for total 5 day course    #anaphylaxis  Received IM epinephrine on . Back to baseline shortly afterwards. No signs or symptoms of airway compromise.  - avoid allergens  - discharged with epipen    # New onset AFib  # gHTN  # SMA syndrome   - Cardiology f/u appreciated   - TTE reviewed   - Cards rec: switched Nifedipine XL to Labetalol and increased to 400mg po tid  - outpatient f/u with Dr. Skaggs    # Post-  pain   - c/w tylenol and Ibuprofen      Patient was discharged to: home    New medications: ibuprofen, tylenol, prednisone, epipen  Changes to old medications:  Medications that were stopped:    Items to follow up as outpatient:    Physical exam at the time of discharge:    GENERAL: NAD, sitting up in bed  HEAD:  Atraumatic, Normocephalic  EYES: EOMI, PERRLA, conjunctiva and sclera clear  ENMT: Moist mucous membranes  NECK: Supple  NERVOUS SYSTEM:  Alert & Oriented X3, Good concentration  CHEST/LUNG: Clear to percussion bilaterally; No rales, rhonchi, wheezing, or rubs  HEART: Regular rate and rhythm; No murmurs, rubs, or gallops  ABDOMEN: Soft, Nontender, Nondistended; Bowel sounds present  EXTREMITIES:  2+ Peripheral Pulses, No edema

## 2023-12-21 NOTE — DIETITIAN INITIAL EVALUATION ADULT - OTHER INFO
24 yo  at 34w0d presents with  contractions. Per patient, she left [place] from [facility] earlier this evening after presenting with  contractions. Per chart review patient has gHTN, not yet meeting criteria for PEC w/ SF. Patient is complaining of contractions [id number] and rectal pressure. Upon arrive BP is 176/114. She denies leakage of fluid, vaginal bleeding. Endorses fetal movement.    Patient seen at bedside for nutrition assessment. Current diet order: regular. Allergy to shellfish. Patient stepped up to MICU yesterday after having allergic reaction to shrimp. Reports her niece had shrimp and took the top off the plate and the smell caused anaphylaxis. Reports she has never reacted like this before to the smell of shellfish, only when she ingests it herself. No difficulty chewing/swallowing reported. Reports blank appetite. Patient consumed blank % of breakfast which included blank. Food recall PTA. Nutrition education. Dosing weight: blank pounds, BMI, reports UBW of blank pounds. No pressure injuries documented. No edema documented. Denies N/V/D/C. Labs. Meds: blank. Observed with no overt signs and symptoms of muscle or fat wasting. Based on ASPEN guidelines, pt does not meet criteria for malnutrition. No cultural, ethnic, Buddhism food preferences reported. See nutrition recommendations below.  22 yo  at 34w0d presents with  contractions. Per patient, she left [place] from [facility] earlier this evening after presenting with  contractions. Per chart review patient has gHTN, not yet meeting criteria for PEC w/ SF. Patient is complaining of contractions [id number] and rectal pressure. Upon arrive BP is 176/114. She denies leakage of fluid, vaginal bleeding. Endorses fetal movement.    Patient seen at bedside for nutrition assessment. Current diet order: regular. Allergy to shellfish. Patient stepped up to MICU yesterday after having allergic reaction to shrimp. Reports her niece had shrimp and took the top off the plate and the smell caused anaphylaxis. Reports she has never reacted like this before to the smell of shellfish, only when she ingests it herself. No difficulty chewing/swallowing reported. Reports blank appetite. Patient consumed blank % of breakfast which included blank. Food recall PTA. Nutrition education. Dosing weight: blank pounds, BMI, reports UBW of blank pounds. No pressure injuries documented. No edema documented. Denies N/V/D/C. Labs. Meds: blank. Observed with no overt signs and symptoms of muscle or fat wasting. Based on ASPEN guidelines, pt does not meet criteria for malnutrition. No cultural, ethnic, Roman Catholic food preferences reported. See nutrition recommendations below.  22 yo  at 34w0d presents with  contractions. Per patient, she left [facility] from [facility] earlier this evening after presenting with  contractions. Per chart review patient has gHTN, not yet meeting criteria for PEC w/ SF. Patient is complaining of contractions [id number] and rectal pressure. Upon arrive BP is 176/114. She denies leakage of fluid, vaginal bleeding. Endorses fetal movement.    Patient seen at bedside for nutrition assessment. Current diet order: regular. Allergy to shellfish. Patient stepped up to MICU yesterday after having allergic reaction to shrimp. Reports her niece had shrimp and took the top off the plate and the smell caused anaphylaxis. Reports she has never reacted like this before to the smell of shellfish, only when she ingests it herself. Reports sore throat today due to intubation, RN aware. Reports good appetite, had not consumed anything since breakfast yesterday so was eager to start eating during RD interview. Reports that during pregnancy, patient had severe morning sickness with vomiting throughout the pregnancy and experienced PICA where she was ingesting corn starch. Patient is pumping as baby is in the NICU, reports engorgement in one breast, working with lactation consultant. Provided nutrition education discussing importance of adequate protein and calories, food sources of protein, adequate fluids, continuing with prenatal MVI, importance of adequate iron intake. Provided handout "Breastfeeding nutrition therapy." Reports pre-pregnancy weight of 120 lbs, says her weight went down to 98 lbs during pregnancy due to the nausea and vomiting caused by morning sickness but was able to get weight back up towards end of pregnancy, dosing weight 150 lbs. No edema documented. Denies N/V/D/C. Nutritionally pertinent labs within normal limits. Meds: bowel regimen. Observed with no overt signs and symptoms of muscle or fat wasting. Based on ASPEN guidelines, pt does not meet criteria for malnutrition. No cultural, ethnic, Moravian food preferences reported. See nutrition recommendations below.  24 yo  at 34w0d presents with  contractions. Per patient, she left [facility] earlier this evening after presenting with  contractions. Per chart review patient has gHTN, not yet meeting criteria for PEC w/ SF. Patient is complaining of contractions [id number] and rectal pressure. Upon arrive BP is 176/114. She denies leakage of fluid, vaginal bleeding. Endorses fetal movement.    Patient seen at bedside for nutrition assessment. Current diet order: regular. Allergy to shellfish. Patient stepped up to MICU yesterday after having allergic reaction to shrimp. Reports her niece had shrimp and took the top off the plate and the smell caused anaphylaxis. Reports she has never reacted like this before to the smell of shellfish, only when she ingests it herself. Reports sore throat today due to intubation, RN aware. Reports good appetite, had not consumed anything since breakfast yesterday so was eager to start eating during RD interview. Reports that during pregnancy, patient had severe morning sickness with vomiting throughout the pregnancy and experienced PICA where she was ingesting corn starch. Patient is pumping as baby is in the NICU, reports engorgement in one breast, working with lactation consultant. Provided nutrition education discussing importance of adequate protein and calories, food sources of protein, adequate fluids, continuing with prenatal MVI, importance of adequate iron intake. Provided handout "Breastfeeding nutrition therapy." Reports pre-pregnancy weight of 120 lbs, says her weight went down to 98 lbs during pregnancy due to the nausea and vomiting caused by morning sickness but was able to get weight back up towards end of pregnancy, dosing weight 150 lbs. No edema documented. Denies N/V/D/C. Nutritionally pertinent labs within normal limits. Meds: bowel regimen. Observed with no overt signs and symptoms of muscle or fat wasting. Based on ASPEN guidelines, pt does not meet criteria for malnutrition. No cultural, ethnic, Yazdanism food preferences reported. See nutrition recommendations below.

## 2023-12-21 NOTE — DIETITIAN INITIAL EVALUATION ADULT - PERTINENT MEDS FT
Pt. Cancelled appointment for today   MEDICATIONS  (STANDING):  acetaminophen     Tablet .. 975 milliGRAM(s) Oral <User Schedule>  bisacodyl 5 milliGRAM(s) Oral at bedtime  chlorhexidine 2% Cloths 1 Application(s) Topical <User Schedule>  enoxaparin Injectable 40 milliGRAM(s) SubCutaneous every 24 hours  ibuprofen  Tablet. 600 milliGRAM(s) Oral every 6 hours  labetalol 400 milliGRAM(s) Oral every 8 hours  methylPREDNISolone sodium succinate Injectable 40 milliGRAM(s) IV Push every 12 hours  mometasone 220 MICROgram(s) Inhaler 2 Puff(s) Inhalation daily  oxytocin Infusion 333.333 milliUNIT(s)/Min (1000 mL/Hr) IV Continuous <Continuous>  polyethylene glycol 3350 17 Gram(s) Oral daily    MEDICATIONS  (PRN):  diphenhydrAMINE 25 milliGRAM(s) Oral every 6 hours PRN Pruritus  EPINEPHrine     1 mG/mL Injectable 0.3 milliGRAM(s) IntraMuscular once PRN ANAPHYLAXSIS  lanolin Ointment 1 Application(s) Topical every 6 hours PRN Sore Nipples  magnesium hydroxide Suspension 30 milliLiter(s) Oral two times a day PRN Constipation  oxyCODONE    IR 5 milliGRAM(s) Oral once PRN Moderate to Severe Pain (4-10)  oxyCODONE    IR 5 milliGRAM(s) Oral every 3 hours PRN Moderate to Severe Pain (4-10)  senna 1 Tablet(s) Oral daily PRN Constipation  simethicone 80 milliGRAM(s) Chew every 4 hours PRN Gas

## 2023-12-21 NOTE — PROGRESS NOTE ADULT - SUBJECTIVE AND OBJECTIVE BOX
Patient was transferred to MICU yesterday after anaphylactic reaction to shrimp s/p epinephrine drip, albuterol nebs.  Patient evaluated at bedside this morning, resting comfortable in bed. She is breathing much better now and feels back to her normal self. She is inquiring about her placenta and she plans to encapsulate it.   She reports pain is well controlled with oral pain medications.   She denies heavy vaginal bleeding. She denies headache, scotoma, chest pain, shortness of breath, RUQ/epigastric pain, nausea, vomiting, fever, chills, dizziness, palpitations.  She has been ambulating without assistance, voiding spontaneously, passing gas, tolerating regular diet.     Physical Exam:  Vital Signs Last 24 Hrs  T(C): 36.7 (21 Dec 2023 09:40), Max: 36.9 (21 Dec 2023 01:02)  T(F): 98 (21 Dec 2023 09:40), Max: 98.5 (21 Dec 2023 01:02)  HR: 74 (21 Dec 2023 10:00) (67 - 95)  BP: 136/83 (21 Dec 2023 10:00) (123/76 - 174/104)  BP(mean): 104 (21 Dec 2023 10:00) (92 - 134)  RR: 18 (21 Dec 2023 10:00) (13 - 25)  SpO2: 98% (21 Dec 2023 10:00) (98% - 100%)    Parameters below as of 21 Dec 2023 10:00  Patient On (Oxygen Delivery Method): room air        GA: well-appearing, NAD  Pulm: no increased WOB  Abd: soft, nontender, nondistended, no rebound or guarding, incision clean, dry and intact, uterus firm at midline,  fb below umbilicus  Extremities: no swelling or calf tenderness                             9.7    7.24  )-----------( 344      ( 21 Dec 2023 05:30 )             28.2     12-21    134<L>  |  102  |  8   ----------------------------<  118<H>  4.1   |  22  |  0.57    Ca    9.3      21 Dec 2023 05:30  Phos  4.0     12-21  Mg     2.0     12-21    TPro  6.6  /  Alb  3.4  /  TBili  0.5  /  DBili  x   /  AST  32  /  ALT  26  /  AlkPhos  115  12-21    Magnesium: 2.0 mg/dL (12-21 @ 05:30)    PTT - ( 20 Dec 2023 19:49 )  PTT:30.0 sec

## 2023-12-21 NOTE — DIETITIAN INITIAL EVALUATION ADULT - NSPROEDALEARNPREF_GEN_A_NUR
importance of adequate protein and calories, food sources of protein, adequate fluids, continuing with prenatal MVI, importance of adequate iron/verbal instruction/written material

## 2023-12-21 NOTE — DIETITIAN INITIAL EVALUATION ADULT - OTHER CALCULATIONS
Pre-pregnancy weight used to estimate needs (120lbs). Needs adjusted for breastfeeding, post-op healing.  Estimated energy needs: 3250-6526 kcal (25-30kcal/kg +500)  Estimated protein needs: 71g  Estimated fluid needs: 7773-4328 mL (35-40mL/kg)  Pre-pregnancy weight used to estimate needs (120lbs). Needs adjusted for breastfeeding, post-op healing.  Estimated energy needs: 6145-0623 kcal (25-30kcal/kg +500)  Estimated protein needs: 71g  Estimated fluid needs: 3558-2569 mL (35-40mL/kg)

## 2023-12-29 LAB
SURGICAL PATHOLOGY STUDY: SIGNIFICANT CHANGE UP
SURGICAL PATHOLOGY STUDY: SIGNIFICANT CHANGE UP

## 2024-02-18 NOTE — CONSULT NOTE ADULT - ATTENDING COMMENTS
Pt is a 22 y/o woman c asthma, cardiac arrest, gHTN reported in afib during labor.    Currently not in fib, ?p-afib    Chadsvasc 1-2, 2 if gHTN can count as risk?    agree not to recommend ac  f/u with Dr Skaggs as outpt Pt is a 24 y/o woman c asthma, cardiac arrest, gHTN reported in afib during labor.    Currently not in fib, ?p-afib    Chadsvasc 1-2, 2 if gHTN can count as risk?    agree not to recommend ac  f/u with Dr Skaggs as outpt 01-Apr-2024

## 2024-03-06 PROBLEM — F31.9 BIPOLAR DISORDER, UNSPECIFIED: Chronic | Status: ACTIVE | Noted: 2023-12-15

## 2024-03-06 PROBLEM — F90.9 ATTENTION-DEFICIT HYPERACTIVITY DISORDER, UNSPECIFIED TYPE: Chronic | Status: ACTIVE | Noted: 2023-12-15

## 2024-03-06 PROBLEM — K55.1 CHRONIC VASCULAR DISORDERS OF INTESTINE: Chronic | Status: ACTIVE | Noted: 2023-12-15

## 2024-03-06 PROBLEM — J45.909 UNSPECIFIED ASTHMA, UNCOMPLICATED: Chronic | Status: ACTIVE | Noted: 2023-12-15

## 2024-08-22 NOTE — ED ADULT TRIAGE NOTE - PAIN: PRESENCE, MLM
complains of pain/discomfort
Ingrid Prajapati  [address], [place] [address]  Phone: [phone]  Fax: (   )    -  Follow Up Time:     Carmelina Murdock  [address] [address]  Phone: [phone]  Fax: (   )    -  Follow Up Time:

## 2025-02-03 NOTE — ED PROVIDER NOTE - NSCAREINITIATED _GEN_ER
Problem: Pain  Goal: Acceptable pain level achieved/maintained at rest using appropriate pain scale for the patient  Outcome: Monitoring/Evaluating progress  Goal: Acceptable pain level achieved/maintained with activity using appropriate pain scale for the patient  Outcome: Monitoring/Evaluating progress  Goal: Acceptable pain level achieved/maintained without oversedation  Outcome: Monitoring/Evaluating progress     Problem: At Risk for Falls  Goal: Patient does not fall  Outcome: Monitoring/Evaluating progress  Goal: Patient takes action to control fall-related risks  Outcome: Monitoring/Evaluating progress     Problem: At Risk for Injury Due to Fall  Goal: Patient does not fall  Outcome: Monitoring/Evaluating progress  Goal: Takes action to control condition specific risks  Outcome: Monitoring/Evaluating progress  Goal: Verbalizes understanding of fall-related injury personal risks  Description: Document education using the patient education activity  Outcome: Monitoring/Evaluating progress     Problem: Impaired Physical Mobility  Goal: Functional status is maintained or returned to baseline during hospitalization  Outcome: Monitoring/Evaluating progress  Goal: Tolerates activity for discharge setting with no abnormal symptoms  Outcome: Monitoring/Evaluating progress     Problem: Breathing Pattern Ineffective  Goal: Air exchange is effective, demonstrated by Sp02 sat of greater then or = 92% (or as ordered)  Outcome: Monitoring/Evaluating progress  Goal: Respiratory pattern is quiet and regular without report of SOB  Outcome: Monitoring/Evaluating progress  Goal: Breathing pattern demonstrates minimal apnea during sleep with appropriate use of airway pressure support devices  Outcome: Monitoring/Evaluating progress  Goal: Verbalizes/demonstrates effective breathing management strategies  Description: Document education using the patient education activity.   Outcome: Monitoring/Evaluating progress     Problem:  Pneumonia  Goal: S/S of acute pneumonia are resolved  Description: If acute pneumonia is present, monitor for resolution of fever, cough, secretions and other test values based on presentation.  Outcome: Monitoring/Evaluating progress  Goal: Verbalizes understanding of pneumonia, treatment, and ongoing prevention  Description: Document on Patient Education Activity  Outcome: Monitoring/Evaluating progress      Chris Gan)